# Patient Record
Sex: FEMALE | Race: WHITE | NOT HISPANIC OR LATINO | Employment: FULL TIME | URBAN - METROPOLITAN AREA
[De-identification: names, ages, dates, MRNs, and addresses within clinical notes are randomized per-mention and may not be internally consistent; named-entity substitution may affect disease eponyms.]

---

## 2023-09-19 LAB
BASOPHILS (10*3/UL) IN BLOOD BY AUTOMATED COUNT: 0.04 X10E9/L (ref 0–0.1)
BASOPHILS/100 LEUKOCYTES IN BLOOD BY AUTOMATED COUNT: 0.6 % (ref 0–2)
C REACTIVE PROTEIN (MG/L) IN SER/PLAS BY HIGH SENSIT: 1.5 MG/L
EOSINOPHILS (10*3/UL) IN BLOOD BY AUTOMATED COUNT: 0.03 X10E9/L (ref 0–0.7)
EOSINOPHILS/100 LEUKOCYTES IN BLOOD BY AUTOMATED COUNT: 0.5 % (ref 0–6)
ERYTHROCYTE DISTRIBUTION WIDTH (RATIO) BY AUTOMATED COUNT: 15.3 % (ref 11.5–14.5)
ERYTHROCYTE MEAN CORPUSCULAR HEMOGLOBIN CONCENTRATION (G/DL) BY AUTOMATED: 32.2 G/DL (ref 32–36)
ERYTHROCYTE MEAN CORPUSCULAR VOLUME (FL) BY AUTOMATED COUNT: 86 FL (ref 80–100)
ERYTHROCYTES (10*6/UL) IN BLOOD BY AUTOMATED COUNT: 5.57 X10E12/L (ref 4–5.2)
HEMATOCRIT (%) IN BLOOD BY AUTOMATED COUNT: 47.9 % (ref 36–46)
HEMOGLOBIN (G/DL) IN BLOOD: 15.4 G/DL (ref 12–16)
IGA (MG/DL) IN SER/PLAS: 116 MG/DL (ref 70–400)
IGG (MG/DL) IN SER/PLAS: 764 MG/DL (ref 700–1600)
IGG (MG/DL) IN SER/PLAS: 764 MG/DL (ref 700–1600)
IGG SUBCLASS 1 (MG/DL) IN SERUM: 496 MG/DL (ref 490–1140)
IGG SUBCLASS 2 (MG/DL) IN SERUM: 256 MG/DL (ref 150–640)
IGG SUBCLASS 3 (MG/DL) IN SERUM: 24 MG/DL (ref 11–85)
IGG SUBCLASS 4 (MG/DL) IN SERUM: 27 MG/DL (ref 3–200)
IGM (MG/DL) IN SER/PLAS: 58 MG/DL (ref 40–230)
IMMATURE GRANULOCYTES/100 LEUKOCYTES IN BLOOD BY AUTOMATED COUNT: 0.3 % (ref 0–0.9)
LEUKOCYTES (10*3/UL) IN BLOOD BY AUTOMATED COUNT: 6.4 X10E9/L (ref 4.4–11.3)
LYMPHOCYTES (10*3/UL) IN BLOOD BY AUTOMATED COUNT: 2.02 X10E9/L (ref 1.2–4.8)
LYMPHOCYTES/100 LEUKOCYTES IN BLOOD BY AUTOMATED COUNT: 31.5 % (ref 13–44)
MONOCYTES (10*3/UL) IN BLOOD BY AUTOMATED COUNT: 0.34 X10E9/L (ref 0.1–1)
MONOCYTES/100 LEUKOCYTES IN BLOOD BY AUTOMATED COUNT: 5.3 % (ref 2–10)
NEUTROPHILS (10*3/UL) IN BLOOD BY AUTOMATED COUNT: 3.97 X10E9/L (ref 1.2–7.7)
NEUTROPHILS/100 LEUKOCYTES IN BLOOD BY AUTOMATED COUNT: 61.8 % (ref 40–80)
PLATELETS (10*3/UL) IN BLOOD AUTOMATED COUNT: 276 X10E9/L (ref 150–450)
SEDIMENTATION RATE, ERYTHROCYTE: 1 MM/H (ref 0–30)

## 2023-09-20 LAB
ALLERGEN ANIMAL: CAT DANDER IGE (KU/L): <0.1 KU/L
ALLERGEN ANIMAL: DOG DANDER IGE (KU/L): <0.1 KU/L
ALLERGEN GRASS: BERMUDA GRASS (CYNODON DACTYLON) IGE (KU/L): <0.1 KU/L
ALLERGEN GRASS: JOHNSON GRASS (SORGHUM HALEPENSE) IGE (KU/L): <0.1 KU/L
ALLERGEN GRASS: MEADOW GRASS, KENTUCKY BLUE (POA PRATENSIS )IGE (KU/L): <0.1 KU/L
ALLERGEN GRASS: TIMOTHY GRASS (PHLEUM PRATENSE) IGE (KU/L): <0.1 KU/L
ALLERGEN INSECT: COCKROACH IGE: <0.1 KU/L
ALLERGEN MICROORGANISM: ALTERNARIA ALTERNATA IGE (KU/L): <0.1 KU/L
ALLERGEN MICROORGANISM: ASPERGILLUS FUMIGATUS IGE (KU/L): <0.1 KU/L
ALLERGEN MICROORGANISM: CLADOSPORIUM HERBARUM IGE (KU/L): <0.1 KU/L
ALLERGEN MICROORGANISM: PENICILLIUM CHRYSOGENUM (P. NOTATUM) IGE (KU/L): <0.1 KU/L
ALLERGEN MITE: DERMATOPHAGOIDES FARINAE (HOUSE DUST MITE) IGE (KU/L): <0.1 KU/L
ALLERGEN MITE: DERMATOPHAGOIDES PTERONYSSINUS (HOUSE DUST MITE) IGE (KU/L): <0.1 KU/L
ALLERGEN TREE: BOX-ELDER (ACER NEGUNDO) IGE (KU/L): <0.1 KU/L
ALLERGEN TREE: COMMON SILVER BIRCH (BETULA VERRUCOSA) IGE (KU/L): <0.1 KU/L
ALLERGEN TREE: COTTONWOOD (POPULUS DELTOIDES) IGE (KU/L): <0.1 KU/L
ALLERGEN TREE: ELM (ULMUS AMERICANA) IGE (KU/L): <0.1 KU/L
ALLERGEN TREE: MAPLE LEAF SYCAMORE, LONDON PLANE IGE (KU/L): <0.1 KU/L
ALLERGEN TREE: MOUNTAIN JUNIPER (JUNIPERUS SABINOIDES) IGE (KU/L): <0.1 KU/L
ALLERGEN TREE: MULBERRY (MORUS ALBA) IGE (KU/L): <0.1 KU/L
ALLERGEN TREE: OAK (QUERCUS ALBA) IGE (KU/L): <0.1 KU/L
ALLERGEN TREE: PECAN, HICKORY (CARYA PECAN) IGE (KU/L): <0.1 KU/L
ALLERGEN TREE: WALNUT IGE: <0.1 KU/L
ALLERGEN TREE: WHITE ASH (FRAXINUS AMERICANA) IGE (KU/L): <0.1 KU/L
ALLERGEN WEED: COMMON PIGWEED (AMARANTHUS RETROFLEXUS) IGE (KU/L): <0.1 KU/L
ALLERGEN WEED: COMMON RAGWEED (AMB. ARTEMISIIFOLIA/A. ELATIOR) IGE (KU/L): <0.1 KU/L
ALLERGEN WEED: GOOSEFOOT, LAMB'S QUARTERS (CHENOPODIUM ALBUM) IGE (KU/L): <0.1 KU/L
ALLERGEN WEED: PLANTAIN (ENGLISH), RIBWORT (PLANTAGO LANCEOLATA) IGE (KU/L): <0.1 KU/L
ALLERGEN WEED: PRICKLY SALTWORT/RUSSIAN THISTLE (SALSOLA KALI) IGE (KU/L): <0.1 KU/L
ALLERGEN WEED: SHEEP SORREL (RUMEX ACETOSELLA) IGE (KU/L): <0.1 KU/L
IMMUNOCAP IGE: 53.3 KU/L (ref 0–214)
IMMUNOCAP INTERPRETATION: NORMAL

## 2023-09-22 LAB
CD19 ABSOLUTE: 0.08 X10E9/L (ref 0.07–0.91)
CD19%: 4 % (ref 6–19)
CD3 ABSOLUTE: 1.55 X10E9/L (ref 0.71–4.18)
CD3%: 77 % (ref 59–87)
CD3+CD4+ ABSOLUTE: 1.23 X10E9/L (ref 0.35–2.74)
CD3+CD4-CD8-%: 1 % (ref 0–6)
CD3+CD8+ ABSOLUTE: 0.3 X10E9/L (ref 0.08–1.49)
CD3-CD16+CD56+ ABSOLUTE: 0.38 X10E9/L (ref 0–0.86)
CD3-CD16+CD56+%: 19 % (ref 0–18)
CD4/CD8 RATIO: 4.07 (ref 1–3.5)
CD45%: 100 %
CP CD3+CD4+%: 61 % (ref 29–57)
CP CD3+CD8+%: 15 % (ref 7–31)
FMETH: ABNORMAL
FSIT1: ABNORMAL
INTERFERON GAMMA: <4.2 PG/ML
INTERLEUKIN 1 BETA: <6.5 PG/ML
INTERLEUKIN 10: 3.9 PG/ML
INTERLEUKIN 12: <1.9 PG/ML
INTERLEUKIN 13: <1.7 PG/ML
INTERLEUKIN 17: <1.4 PG/ML
INTERLEUKIN 2 RECEPTOR, SOLUBLE: 757.5 PG/ML (ref 175.3–858.2)
INTERLEUKIN 2: <2.1 PG/ML
INTERLEUKIN 4: <2.2 PG/ML
INTERLEUKIN 5: <2.1 PG/ML
INTERLEUKIN 6: <2 PG/ML
INTERLEUKIN 8: <3 PG/ML
MARKER INTERPRETATION: ABNORMAL
PNEUMO SEROTYPE INTERPRETATION: NORMAL
PV191: NORMAL
SEROTYPE 1, VIRC: 0.08 UG/ML
SEROTYPE 10A(34), VIRC: 0.83 UG/ML
SEROTYPE 11A(43), VIRC: 0.39 UG/ML
SEROTYPE 12F, VIRC: 0.25 UG/ML
SEROTYPE 14, VIRC: 0.78 UG/ML
SEROTYPE 15B(54), VIRC: 1.18 UG/ML
SEROTYPE 17F, VIRC: 0.63 UG/ML
SEROTYPE 18C(56), VIRC: 0.38 UG/ML
SEROTYPE 19A(57), VIRC: 1.38 UG/ML
SEROTYPE 19F, VIRC: 1.21 UG/ML
SEROTYPE 2, VIRC: 3.34 UG/ML
SEROTYPE 20, VIRC: 0.43 UG/ML
SEROTYPE 22F, VIRC: 0.22 UG/ML
SEROTYPE 23F, VIRC: 0.02 UG/ML
SEROTYPE 3, VIRC: 0.88 UG/ML
SEROTYPE 33F(70), VIRC: 2.1 UG/ML
SEROTYPE 4, VIRC: 0.35 UG/ML
SEROTYPE 5, VIRC: 0.14 UG/ML
SEROTYPE 6B(26), VIRC: 0.55 UG/ML
SEROTYPE 7F(51), VIRC: 0.17 UG/ML
SEROTYPE 8, VIRC: 0.64 UG/ML
SEROTYPE 9N, VIRC: 0.55 UG/ML
SEROTYPE 9V(68), VIRC: 0.42 UG/ML
TETANUS AB IGG: 2.5 IU/ML
TUMOR NECROSIS FACTOR ALPHA: <1.7 PG/ML

## 2023-09-28 LAB — TRYPTASE: 8.1 MCG/L

## 2023-10-06 ENCOUNTER — TELEPHONE (OUTPATIENT)
Dept: ALLERGY | Facility: CLINIC | Age: 63
End: 2023-10-06
Payer: OTHER GOVERNMENT

## 2023-12-03 ENCOUNTER — ANESTHESIA (OUTPATIENT)
Dept: OPERATING ROOM | Facility: HOSPITAL | Age: 63
DRG: 142 | End: 2023-12-03
Payer: OTHER GOVERNMENT

## 2023-12-03 ENCOUNTER — HOSPITAL ENCOUNTER (INPATIENT)
Facility: HOSPITAL | Age: 63
LOS: 4 days | DRG: 142 | End: 2023-12-07
Attending: EMERGENCY MEDICINE
Payer: OTHER GOVERNMENT

## 2023-12-03 ENCOUNTER — APPOINTMENT (OUTPATIENT)
Dept: RADIOLOGY | Facility: HOSPITAL | Age: 63
End: 2023-12-03
Payer: OTHER GOVERNMENT

## 2023-12-03 ENCOUNTER — HOSPITAL ENCOUNTER (EMERGENCY)
Facility: HOSPITAL | Age: 63
Discharge: ANOTHER HOSPITAL | End: 2023-12-03
Attending: EMERGENCY MEDICINE
Payer: OTHER GOVERNMENT

## 2023-12-03 VITALS
WEIGHT: 150 LBS | SYSTOLIC BLOOD PRESSURE: 123 MMHG | HEART RATE: 85 BPM | OXYGEN SATURATION: 96 % | RESPIRATION RATE: 16 BRPM | BODY MASS INDEX: 24.11 KG/M2 | HEIGHT: 66 IN | TEMPERATURE: 97.9 F | DIASTOLIC BLOOD PRESSURE: 67 MMHG

## 2023-12-03 DIAGNOSIS — M27.2: ICD-10-CM

## 2023-12-03 DIAGNOSIS — M27.2 ACUTE OSTEOMYELITIS OF MANDIBLE: ICD-10-CM

## 2023-12-03 DIAGNOSIS — R20.0 ANESTHESIA: Primary | ICD-10-CM

## 2023-12-03 DIAGNOSIS — M27.2 OSTEOMYELITIS OF MANDIBLE: Primary | ICD-10-CM

## 2023-12-03 DIAGNOSIS — M86.28 SUBACUTE OSTEOMYELITIS, OTHER SITE (MULTI): Primary | ICD-10-CM

## 2023-12-03 DIAGNOSIS — R68.84 JAW PAIN, NON-TMJ: Primary | ICD-10-CM

## 2023-12-03 DIAGNOSIS — M86.9 OSTEOMYELITIS (MULTI): Primary | ICD-10-CM

## 2023-12-03 PROBLEM — J44.9 CHRONIC OBSTRUCTIVE PULMONARY DISEASE (MULTI): Status: ACTIVE | Noted: 2023-12-03

## 2023-12-03 LAB
ABO GROUP (TYPE) IN BLOOD: NORMAL
ANION GAP SERPL CALC-SCNC: 9 MMOL/L (ref 10–20)
ANTIBODY SCREEN: NORMAL
APTT PPP: 31 SECONDS (ref 27–38)
BASOPHILS # BLD AUTO: 0.04 X10*3/UL (ref 0–0.1)
BASOPHILS NFR BLD AUTO: 0.4 %
BUN SERPL-MCNC: 12 MG/DL (ref 6–23)
CALCIUM SERPL-MCNC: 8.9 MG/DL (ref 8.6–10.3)
CHLORIDE SERPL-SCNC: 104 MMOL/L (ref 98–107)
CO2 SERPL-SCNC: 30 MMOL/L (ref 21–32)
CREAT SERPL-MCNC: 0.77 MG/DL (ref 0.5–1.05)
EOSINOPHIL # BLD AUTO: 0.17 X10*3/UL (ref 0–0.7)
EOSINOPHIL NFR BLD AUTO: 1.5 %
ERYTHROCYTE [DISTWIDTH] IN BLOOD BY AUTOMATED COUNT: 14 % (ref 11.5–14.5)
GFR SERPL CREATININE-BSD FRML MDRD: 87 ML/MIN/1.73M*2
GLUCOSE SERPL-MCNC: 98 MG/DL (ref 74–99)
HCT VFR BLD AUTO: 47.6 % (ref 36–46)
HGB BLD-MCNC: 15.6 G/DL (ref 12–16)
IMM GRANULOCYTES # BLD AUTO: 0.04 X10*3/UL (ref 0–0.7)
IMM GRANULOCYTES NFR BLD AUTO: 0.4 % (ref 0–0.9)
INR PPP: 1 (ref 0.9–1.1)
LYMPHOCYTES # BLD AUTO: 2.56 X10*3/UL (ref 1.2–4.8)
LYMPHOCYTES NFR BLD AUTO: 23.1 %
MCH RBC QN AUTO: 28.4 PG (ref 26–34)
MCHC RBC AUTO-ENTMCNC: 32.8 G/DL (ref 32–36)
MCV RBC AUTO: 87 FL (ref 80–100)
MONOCYTES # BLD AUTO: 0.67 X10*3/UL (ref 0.1–1)
MONOCYTES NFR BLD AUTO: 6 %
NEUTROPHILS # BLD AUTO: 7.6 X10*3/UL (ref 1.2–7.7)
NEUTROPHILS NFR BLD AUTO: 68.6 %
NRBC BLD-RTO: 0 /100 WBCS (ref 0–0)
PLATELET # BLD AUTO: 280 X10*3/UL (ref 150–450)
POTASSIUM SERPL-SCNC: 4 MMOL/L (ref 3.5–5.3)
PROTHROMBIN TIME: 11.1 SECONDS (ref 9.8–12.8)
RBC # BLD AUTO: 5.5 X10*6/UL (ref 4–5.2)
RH FACTOR (ANTIGEN D): NORMAL
SODIUM SERPL-SCNC: 139 MMOL/L (ref 136–145)
WBC # BLD AUTO: 11.1 X10*3/UL (ref 4.4–11.3)

## 2023-12-03 PROCEDURE — 3700000002 HC GENERAL ANESTHESIA TIME - EACH INCREMENTAL 1 MINUTE: Performed by: DENTIST

## 2023-12-03 PROCEDURE — 0NRV0JZ REPLACEMENT OF LEFT MANDIBLE WITH SYNTHETIC SUBSTITUTE, OPEN APPROACH: ICD-10-PCS | Performed by: DENTIST

## 2023-12-03 PROCEDURE — 96372 THER/PROPH/DIAG INJ SC/IM: CPT | Performed by: DENTIST

## 2023-12-03 PROCEDURE — 2500000001 HC RX 250 WO HCPCS SELF ADMINISTERED DRUGS (ALT 637 FOR MEDICARE OP)

## 2023-12-03 PROCEDURE — 0CDWXZ0 EXTRACTION OF UPPER TOOTH, SINGLE, EXTERNAL APPROACH: ICD-10-PCS | Performed by: DENTIST

## 2023-12-03 PROCEDURE — 2500000004 HC RX 250 GENERAL PHARMACY W/ HCPCS (ALT 636 FOR OP/ED): Performed by: STUDENT IN AN ORGANIZED HEALTH CARE EDUCATION/TRAINING PROGRAM

## 2023-12-03 PROCEDURE — 96367 TX/PROPH/DG ADDL SEQ IV INF: CPT

## 2023-12-03 PROCEDURE — 0NRT0JZ REPLACEMENT OF RIGHT MANDIBLE WITH SYNTHETIC SUBSTITUTE, OPEN APPROACH: ICD-10-PCS | Performed by: DENTIST

## 2023-12-03 PROCEDURE — 2550000001 HC RX 255 CONTRASTS: Performed by: PHYSICIAN ASSISTANT

## 2023-12-03 PROCEDURE — 7100000002 HC RECOVERY ROOM TIME - EACH INCREMENTAL 1 MINUTE: Performed by: DENTIST

## 2023-12-03 PROCEDURE — 2720000007 HC OR 272 NO HCPCS: Performed by: DENTIST

## 2023-12-03 PROCEDURE — 86901 BLOOD TYPING SEROLOGIC RH(D): CPT | Performed by: STUDENT IN AN ORGANIZED HEALTH CARE EDUCATION/TRAINING PROGRAM

## 2023-12-03 PROCEDURE — 0NBT0ZZ EXCISION OF RIGHT MANDIBLE, OPEN APPROACH: ICD-10-PCS | Performed by: DENTIST

## 2023-12-03 PROCEDURE — 2550000001 HC RX 255 CONTRASTS: Performed by: EMERGENCY MEDICINE

## 2023-12-03 PROCEDURE — 3600000002 HC OR TIME - INITIAL BASE CHARGE - PROCEDURE LEVEL TWO: Performed by: DENTIST

## 2023-12-03 PROCEDURE — 96365 THER/PROPH/DIAG IV INF INIT: CPT | Mod: 59

## 2023-12-03 PROCEDURE — 88311 DECALCIFY TISSUE: CPT | Performed by: DENTIST

## 2023-12-03 PROCEDURE — 99285 EMERGENCY DEPT VISIT HI MDM: CPT | Mod: 25 | Performed by: EMERGENCY MEDICINE

## 2023-12-03 PROCEDURE — 7100000001 HC RECOVERY ROOM TIME - INITIAL BASE CHARGE: Performed by: DENTIST

## 2023-12-03 PROCEDURE — 85025 COMPLETE CBC W/AUTO DIFF WBC: CPT | Performed by: PHYSICIAN ASSISTANT

## 2023-12-03 PROCEDURE — 88312 SPECIAL STAINS GROUP 1: CPT | Performed by: DENTIST

## 2023-12-03 PROCEDURE — 88305 TISSUE EXAM BY PATHOLOGIST: CPT | Performed by: DENTIST

## 2023-12-03 PROCEDURE — 85730 THROMBOPLASTIN TIME PARTIAL: CPT | Performed by: STUDENT IN AN ORGANIZED HEALTH CARE EDUCATION/TRAINING PROGRAM

## 2023-12-03 PROCEDURE — 99285 EMERGENCY DEPT VISIT HI MDM: CPT | Performed by: EMERGENCY MEDICINE

## 2023-12-03 PROCEDURE — 0NBV0ZZ EXCISION OF LEFT MANDIBLE, OPEN APPROACH: ICD-10-PCS | Performed by: DENTIST

## 2023-12-03 PROCEDURE — 87102 FUNGUS ISOLATION CULTURE: CPT | Performed by: DENTIST

## 2023-12-03 PROCEDURE — C1713 ANCHOR/SCREW BN/BN,TIS/BN: HCPCS | Performed by: DENTIST

## 2023-12-03 PROCEDURE — 2500000005 HC RX 250 GENERAL PHARMACY W/O HCPCS: Performed by: STUDENT IN AN ORGANIZED HEALTH CARE EDUCATION/TRAINING PROGRAM

## 2023-12-03 PROCEDURE — 2500000004 HC RX 250 GENERAL PHARMACY W/ HCPCS (ALT 636 FOR OP/ED): Performed by: PHYSICIAN ASSISTANT

## 2023-12-03 PROCEDURE — 70491 CT SOFT TISSUE NECK W/DYE: CPT

## 2023-12-03 PROCEDURE — 3700000001 HC GENERAL ANESTHESIA TIME - INITIAL BASE CHARGE: Performed by: DENTIST

## 2023-12-03 PROCEDURE — A4217 STERILE WATER/SALINE, 500 ML: HCPCS | Performed by: DENTIST

## 2023-12-03 PROCEDURE — 85610 PROTHROMBIN TIME: CPT | Performed by: STUDENT IN AN ORGANIZED HEALTH CARE EDUCATION/TRAINING PROGRAM

## 2023-12-03 PROCEDURE — 2500000005 HC RX 250 GENERAL PHARMACY W/O HCPCS: Performed by: DENTIST

## 2023-12-03 PROCEDURE — 2500000004 HC RX 250 GENERAL PHARMACY W/ HCPCS (ALT 636 FOR OP/ED)

## 2023-12-03 PROCEDURE — 88305 TISSUE EXAM BY PATHOLOGIST: CPT | Mod: TC,SUR | Performed by: DENTIST

## 2023-12-03 PROCEDURE — 1100000001 HC PRIVATE ROOM DAILY

## 2023-12-03 PROCEDURE — 94640 AIRWAY INHALATION TREATMENT: CPT | Performed by: PAIN MEDICINE

## 2023-12-03 PROCEDURE — 87075 CULTR BACTERIA EXCEPT BLOOD: CPT | Performed by: DENTIST

## 2023-12-03 PROCEDURE — 3600000007 HC OR TIME - EACH INCREMENTAL 1 MINUTE - PROCEDURE LEVEL TWO: Performed by: DENTIST

## 2023-12-03 PROCEDURE — 88311 DECALCIFY TISSUE: CPT | Mod: TC,SUR | Performed by: DENTIST

## 2023-12-03 PROCEDURE — 2500000005 HC RX 250 GENERAL PHARMACY W/O HCPCS

## 2023-12-03 PROCEDURE — 36415 COLL VENOUS BLD VENIPUNCTURE: CPT | Performed by: PHYSICIAN ASSISTANT

## 2023-12-03 PROCEDURE — 86850 RBC ANTIBODY SCREEN: CPT | Performed by: STUDENT IN AN ORGANIZED HEALTH CARE EDUCATION/TRAINING PROGRAM

## 2023-12-03 PROCEDURE — 70491 CT SOFT TISSUE NECK W/DYE: CPT | Performed by: STUDENT IN AN ORGANIZED HEALTH CARE EDUCATION/TRAINING PROGRAM

## 2023-12-03 PROCEDURE — 36415 COLL VENOUS BLD VENIPUNCTURE: CPT | Performed by: STUDENT IN AN ORGANIZED HEALTH CARE EDUCATION/TRAINING PROGRAM

## 2023-12-03 PROCEDURE — 2500000002 HC RX 250 W HCPCS SELF ADMINISTERED DRUGS (ALT 637 FOR MEDICARE OP, ALT 636 FOR OP/ED): Performed by: STUDENT IN AN ORGANIZED HEALTH CARE EDUCATION/TRAINING PROGRAM

## 2023-12-03 PROCEDURE — 80048 BASIC METABOLIC PNL TOTAL CA: CPT | Performed by: PHYSICIAN ASSISTANT

## 2023-12-03 PROCEDURE — A11044 PR DEBRIDEMENT, SKIN, SUB-Q TISSUE,MUSCLE,BONE,=<20 SQ CM: Performed by: PAIN MEDICINE

## 2023-12-03 PROCEDURE — 2500000004 HC RX 250 GENERAL PHARMACY W/ HCPCS (ALT 636 FOR OP/ED): Performed by: DENTIST

## 2023-12-03 PROCEDURE — 2780000003 HC OR 278 NO HCPCS: Performed by: DENTIST

## 2023-12-03 PROCEDURE — 96374 THER/PROPH/DIAG INJ IV PUSH: CPT | Mod: 59

## 2023-12-03 DEVICE — IMPLANTABLE DEVICE: Type: IMPLANTABLE DEVICE | Site: MANDIBLE | Status: FUNCTIONAL

## 2023-12-03 DEVICE — DRILL, STRYKER 1.6 X 58 TWIST: Type: IMPLANTABLE DEVICE | Site: MANDIBLE | Status: NON-FUNCTIONAL

## 2023-12-03 DEVICE — SCREW, 2 X 12 CROSS PIN: Type: IMPLANTABLE DEVICE | Site: MANDIBLE | Status: FUNCTIONAL

## 2023-12-03 DEVICE — SCREW, 2 X 10 CROSS PIN: Type: IMPLANTABLE DEVICE | Site: MANDIBLE | Status: FUNCTIONAL

## 2023-12-03 RX ORDER — LIDOCAINE HCL/PF 100 MG/5ML
SYRINGE (ML) INTRAVENOUS AS NEEDED
Status: DISCONTINUED | OUTPATIENT
Start: 2023-12-03 | End: 2023-12-03

## 2023-12-03 RX ORDER — ENOXAPARIN SODIUM 100 MG/ML
30 INJECTION SUBCUTANEOUS EVERY 12 HOURS
Status: DISCONTINUED | OUTPATIENT
Start: 2023-12-03 | End: 2023-12-07 | Stop reason: HOSPADM

## 2023-12-03 RX ORDER — ALBUTEROL SULFATE 0.83 MG/ML
2.5 SOLUTION RESPIRATORY (INHALATION) ONCE AS NEEDED
Status: COMPLETED | OUTPATIENT
Start: 2023-12-03 | End: 2023-12-03

## 2023-12-03 RX ORDER — LIDOCAINE HYDROCHLORIDE AND EPINEPHRINE 10; 10 MG/ML; UG/ML
INJECTION, SOLUTION INFILTRATION; PERINEURAL AS NEEDED
Status: DISCONTINUED | OUTPATIENT
Start: 2023-12-03 | End: 2023-12-03 | Stop reason: HOSPADM

## 2023-12-03 RX ORDER — ACETAMINOPHEN 325 MG/1
650 TABLET ORAL ONCE
Status: COMPLETED | OUTPATIENT
Start: 2023-12-03 | End: 2023-12-03

## 2023-12-03 RX ORDER — SODIUM CHLORIDE, SODIUM LACTATE, POTASSIUM CHLORIDE, CALCIUM CHLORIDE 600; 310; 30; 20 MG/100ML; MG/100ML; MG/100ML; MG/100ML
75 INJECTION, SOLUTION INTRAVENOUS CONTINUOUS
Status: DISCONTINUED | OUTPATIENT
Start: 2023-12-03 | End: 2023-12-06

## 2023-12-03 RX ORDER — ONDANSETRON HYDROCHLORIDE 2 MG/ML
INJECTION, SOLUTION INTRAVENOUS AS NEEDED
Status: DISCONTINUED | OUTPATIENT
Start: 2023-12-03 | End: 2023-12-03

## 2023-12-03 RX ORDER — HYDROMORPHONE HYDROCHLORIDE 1 MG/ML
INJECTION, SOLUTION INTRAMUSCULAR; INTRAVENOUS; SUBCUTANEOUS AS NEEDED
Status: DISCONTINUED | OUTPATIENT
Start: 2023-12-03 | End: 2023-12-03

## 2023-12-03 RX ORDER — GUAIFENESIN/DEXTROMETHORPHAN 100-10MG/5
5 SYRUP ORAL 3 TIMES DAILY PRN
COMMUNITY
End: 2023-12-07 | Stop reason: HOSPADM

## 2023-12-03 RX ORDER — VANCOMYCIN HYDROCHLORIDE 1 G/200ML
1000 INJECTION, SOLUTION INTRAVENOUS ONCE
Status: COMPLETED | OUTPATIENT
Start: 2023-12-03 | End: 2023-12-03

## 2023-12-03 RX ORDER — ACETAMINOPHEN 325 MG/1
975 TABLET ORAL ONCE
Status: DISCONTINUED | OUTPATIENT
Start: 2023-12-03 | End: 2023-12-03 | Stop reason: HOSPADM

## 2023-12-03 RX ORDER — ESMOLOL HYDROCHLORIDE 10 MG/ML
INJECTION INTRAVENOUS AS NEEDED
Status: DISCONTINUED | OUTPATIENT
Start: 2023-12-03 | End: 2023-12-03

## 2023-12-03 RX ORDER — LABETALOL HYDROCHLORIDE 5 MG/ML
5 INJECTION, SOLUTION INTRAVENOUS ONCE AS NEEDED
Status: DISCONTINUED | OUTPATIENT
Start: 2023-12-03 | End: 2023-12-03 | Stop reason: HOSPADM

## 2023-12-03 RX ORDER — KETOROLAC TROMETHAMINE 30 MG/ML
15 INJECTION, SOLUTION INTRAMUSCULAR; INTRAVENOUS ONCE
Status: DISCONTINUED | OUTPATIENT
Start: 2023-12-03 | End: 2023-12-03 | Stop reason: HOSPADM

## 2023-12-03 RX ORDER — CLINDAMYCIN PHOSPHATE 600 MG/50ML
600 INJECTION, SOLUTION INTRAVENOUS ONCE
Status: DISCONTINUED | OUTPATIENT
Start: 2023-12-03 | End: 2023-12-03

## 2023-12-03 RX ORDER — CODEINE PHOSPHATE AND GUAIFENESIN 10; 100 MG/5ML; MG/5ML
10 SOLUTION ORAL ONCE
Status: DISCONTINUED | OUTPATIENT
Start: 2023-12-03 | End: 2023-12-03 | Stop reason: HOSPADM

## 2023-12-03 RX ORDER — CHLORHEXIDINE GLUCONATE ORAL RINSE 1.2 MG/ML
15 SOLUTION DENTAL 2 TIMES DAILY
Status: DISCONTINUED | OUTPATIENT
Start: 2023-12-03 | End: 2023-12-07 | Stop reason: HOSPADM

## 2023-12-03 RX ORDER — CHOLECALCIFEROL (VITAMIN D3) 50 MCG
50 TABLET ORAL DAILY
COMMUNITY
End: 2023-12-07 | Stop reason: HOSPADM

## 2023-12-03 RX ORDER — AMPICILLIN AND SULBACTAM 2; 1 G/1; G/1
INJECTION, POWDER, FOR SOLUTION INTRAMUSCULAR; INTRAVENOUS AS NEEDED
Status: DISCONTINUED | OUTPATIENT
Start: 2023-12-03 | End: 2023-12-03

## 2023-12-03 RX ORDER — ACETAMINOPHEN 325 MG/1
650 TABLET ORAL EVERY 6 HOURS
Status: DISCONTINUED | OUTPATIENT
Start: 2023-12-03 | End: 2023-12-07 | Stop reason: HOSPADM

## 2023-12-03 RX ORDER — OXYCODONE HYDROCHLORIDE 5 MG/1
10 TABLET ORAL EVERY 4 HOURS PRN
Status: DISCONTINUED | OUTPATIENT
Start: 2023-12-03 | End: 2023-12-03

## 2023-12-03 RX ORDER — ONDANSETRON HYDROCHLORIDE 2 MG/ML
4 INJECTION, SOLUTION INTRAVENOUS EVERY 8 HOURS PRN
Status: DISCONTINUED | OUTPATIENT
Start: 2023-12-03 | End: 2023-12-07 | Stop reason: HOSPADM

## 2023-12-03 RX ORDER — ACETAMINOPHEN 160 MG/5ML
650 SOLUTION ORAL EVERY 6 HOURS
Status: DISCONTINUED | OUTPATIENT
Start: 2023-12-03 | End: 2023-12-07 | Stop reason: HOSPADM

## 2023-12-03 RX ORDER — HYDROMORPHONE HYDROCHLORIDE 1 MG/ML
0.2 INJECTION, SOLUTION INTRAMUSCULAR; INTRAVENOUS; SUBCUTANEOUS ONCE
Status: DISCONTINUED | OUTPATIENT
Start: 2023-12-03 | End: 2023-12-03

## 2023-12-03 RX ORDER — TRAMADOL HYDROCHLORIDE 50 MG/1
50 TABLET ORAL ONCE
Status: DISCONTINUED | OUTPATIENT
Start: 2023-12-03 | End: 2023-12-04

## 2023-12-03 RX ORDER — KETOROLAC TROMETHAMINE 30 MG/ML
30 INJECTION, SOLUTION INTRAMUSCULAR; INTRAVENOUS EVERY 6 HOURS PRN
Status: DISCONTINUED | OUTPATIENT
Start: 2023-12-03 | End: 2023-12-07 | Stop reason: HOSPADM

## 2023-12-03 RX ORDER — ONDANSETRON 4 MG/1
4 TABLET, ORALLY DISINTEGRATING ORAL EVERY 8 HOURS PRN
Status: DISCONTINUED | OUTPATIENT
Start: 2023-12-03 | End: 2023-12-07 | Stop reason: HOSPADM

## 2023-12-03 RX ORDER — LIDOCAINE HYDROCHLORIDE AND EPINEPHRINE 10; 10 MG/ML; UG/ML
5 INJECTION, SOLUTION INFILTRATION; PERINEURAL ONCE
Status: DISCONTINUED | OUTPATIENT
Start: 2023-12-03 | End: 2023-12-07 | Stop reason: HOSPADM

## 2023-12-03 RX ORDER — ROCURONIUM BROMIDE 10 MG/ML
INJECTION, SOLUTION INTRAVENOUS AS NEEDED
Status: DISCONTINUED | OUTPATIENT
Start: 2023-12-03 | End: 2023-12-03

## 2023-12-03 RX ORDER — CLINDAMYCIN PHOSPHATE 600 MG/50ML
600 INJECTION, SOLUTION INTRAVENOUS EVERY 8 HOURS SCHEDULED
Status: DISCONTINUED | OUTPATIENT
Start: 2023-12-03 | End: 2023-12-03

## 2023-12-03 RX ORDER — DEXAMETHASONE SODIUM PHOSPHATE 4 MG/ML
INJECTION, SOLUTION INTRA-ARTICULAR; INTRALESIONAL; INTRAMUSCULAR; INTRAVENOUS; SOFT TISSUE AS NEEDED
Status: DISCONTINUED | OUTPATIENT
Start: 2023-12-03 | End: 2023-12-03

## 2023-12-03 RX ORDER — HYDROMORPHONE HYDROCHLORIDE 1 MG/ML
0.2 INJECTION, SOLUTION INTRAMUSCULAR; INTRAVENOUS; SUBCUTANEOUS EVERY 5 MIN PRN
Status: DISCONTINUED | OUTPATIENT
Start: 2023-12-03 | End: 2023-12-03 | Stop reason: HOSPADM

## 2023-12-03 RX ORDER — ACETAMINOPHEN 325 MG/1
1-2 TABLET ORAL EVERY 6 HOURS PRN
COMMUNITY
End: 2023-12-25

## 2023-12-03 RX ORDER — SODIUM CHLORIDE 0.9 G/100ML
IRRIGANT IRRIGATION AS NEEDED
Status: DISCONTINUED | OUTPATIENT
Start: 2023-12-03 | End: 2023-12-03 | Stop reason: HOSPADM

## 2023-12-03 RX ORDER — OXYCODONE HYDROCHLORIDE 5 MG/1
5 TABLET ORAL EVERY 4 HOURS PRN
Status: DISCONTINUED | OUTPATIENT
Start: 2023-12-03 | End: 2023-12-03

## 2023-12-03 RX ORDER — DROPERIDOL 2.5 MG/ML
0.62 INJECTION, SOLUTION INTRAMUSCULAR; INTRAVENOUS ONCE AS NEEDED
Status: DISCONTINUED | OUTPATIENT
Start: 2023-12-03 | End: 2023-12-03 | Stop reason: HOSPADM

## 2023-12-03 RX ORDER — BENZONATATE 100 MG/1
100 CAPSULE ORAL 3 TIMES DAILY PRN
Status: DISCONTINUED | OUTPATIENT
Start: 2023-12-03 | End: 2023-12-04

## 2023-12-03 RX ORDER — FENTANYL CITRATE 50 UG/ML
INJECTION, SOLUTION INTRAMUSCULAR; INTRAVENOUS AS NEEDED
Status: DISCONTINUED | OUTPATIENT
Start: 2023-12-03 | End: 2023-12-03

## 2023-12-03 RX ORDER — SODIUM CHLORIDE, SODIUM LACTATE, POTASSIUM CHLORIDE, CALCIUM CHLORIDE 600; 310; 30; 20 MG/100ML; MG/100ML; MG/100ML; MG/100ML
100 INJECTION, SOLUTION INTRAVENOUS CONTINUOUS
Status: DISCONTINUED | OUTPATIENT
Start: 2023-12-03 | End: 2023-12-03 | Stop reason: HOSPADM

## 2023-12-03 RX ORDER — HYDROMORPHONE HYDROCHLORIDE 1 MG/ML
0.5 INJECTION, SOLUTION INTRAMUSCULAR; INTRAVENOUS; SUBCUTANEOUS EVERY 5 MIN PRN
Status: DISCONTINUED | OUTPATIENT
Start: 2023-12-03 | End: 2023-12-03 | Stop reason: HOSPADM

## 2023-12-03 RX ORDER — LIDOCAINE HYDROCHLORIDE AND EPINEPHRINE 10; 10 MG/ML; UG/ML
INJECTION, SOLUTION INFILTRATION; PERINEURAL
Status: COMPLETED
Start: 2023-12-03 | End: 2023-12-03

## 2023-12-03 RX ORDER — CLINDAMYCIN PHOSPHATE 600 MG/50ML
600 INJECTION, SOLUTION INTRAVENOUS ONCE
Status: COMPLETED | OUTPATIENT
Start: 2023-12-03 | End: 2023-12-03

## 2023-12-03 RX ORDER — ONDANSETRON HYDROCHLORIDE 2 MG/ML
4 INJECTION, SOLUTION INTRAVENOUS ONCE
Status: DISCONTINUED | OUTPATIENT
Start: 2023-12-03 | End: 2023-12-03 | Stop reason: HOSPADM

## 2023-12-03 RX ORDER — KETOROLAC TROMETHAMINE 15 MG/ML
15 INJECTION, SOLUTION INTRAMUSCULAR; INTRAVENOUS ONCE
Status: COMPLETED | OUTPATIENT
Start: 2023-12-03 | End: 2023-12-03

## 2023-12-03 RX ORDER — ONDANSETRON HYDROCHLORIDE 2 MG/ML
4 INJECTION, SOLUTION INTRAVENOUS ONCE AS NEEDED
Status: COMPLETED | OUTPATIENT
Start: 2023-12-03 | End: 2023-12-03

## 2023-12-03 RX ORDER — ACETAMINOPHEN 325 MG/1
650 TABLET ORAL EVERY 6 HOURS PRN
Status: DISCONTINUED | OUTPATIENT
Start: 2023-12-03 | End: 2023-12-03 | Stop reason: HOSPADM

## 2023-12-03 RX ORDER — PROPOFOL 10 MG/ML
INJECTION, EMULSION INTRAVENOUS AS NEEDED
Status: DISCONTINUED | OUTPATIENT
Start: 2023-12-03 | End: 2023-12-03

## 2023-12-03 RX ORDER — LIDOCAINE HYDROCHLORIDE 10 MG/ML
0.1 INJECTION INFILTRATION; PERINEURAL ONCE
Status: DISCONTINUED | OUTPATIENT
Start: 2023-12-03 | End: 2023-12-03 | Stop reason: HOSPADM

## 2023-12-03 RX ADMIN — ALBUTEROL SULFATE 2.5 MG: 2.5 SOLUTION RESPIRATORY (INHALATION) at 19:15

## 2023-12-03 RX ADMIN — LIDOCAINE HYDROCHLORIDE 80 MG: 20 INJECTION INTRAVENOUS at 17:56

## 2023-12-03 RX ADMIN — IOHEXOL 75 ML: 350 INJECTION, SOLUTION INTRAVENOUS at 04:52

## 2023-12-03 RX ADMIN — AMPICILLIN SODIUM AND SULBACTAM SODIUM 3 G: 2; 1 INJECTION, POWDER, FOR SOLUTION INTRAMUSCULAR; INTRAVENOUS at 18:15

## 2023-12-03 RX ADMIN — HYDROMORPHONE HYDROCHLORIDE 0.2 MG: 1 INJECTION, SOLUTION INTRAMUSCULAR; INTRAVENOUS; SUBCUTANEOUS at 19:01

## 2023-12-03 RX ADMIN — ACETAMINOPHEN 650 MG: 325 TABLET ORAL at 04:59

## 2023-12-03 RX ADMIN — KETOROLAC TROMETHAMINE 30 MG: 30 INJECTION, SOLUTION INTRAMUSCULAR; INTRAVENOUS at 20:07

## 2023-12-03 RX ADMIN — CHLORHEXIDINE GLUCONATE 0.12% ORAL RINSE 15 ML: 1.2 LIQUID ORAL at 12:15

## 2023-12-03 RX ADMIN — DEXAMETHASONE SODIUM PHOSPHATE 10 MG: 4 INJECTION, SOLUTION INTRA-ARTICULAR; INTRALESIONAL; INTRAMUSCULAR; INTRAVENOUS; SOFT TISSUE at 18:05

## 2023-12-03 RX ADMIN — Medication 1 L/MIN: at 19:30

## 2023-12-03 RX ADMIN — AMPICILLIN SODIUM AND SULBACTAM SODIUM 3 G: 2; 1 INJECTION, POWDER, FOR SOLUTION INTRAMUSCULAR; INTRAVENOUS at 12:40

## 2023-12-03 RX ADMIN — ESMOLOL HYDROCHLORIDE 30 MG: 10 INJECTION, SOLUTION INTRAVENOUS at 18:29

## 2023-12-03 RX ADMIN — CLINDAMYCIN PHOSPHATE 600 MG: 600 INJECTION, SOLUTION INTRAVENOUS at 04:00

## 2023-12-03 RX ADMIN — SODIUM CHLORIDE, POTASSIUM CHLORIDE, SODIUM LACTATE AND CALCIUM CHLORIDE 100 ML/HR: 600; 310; 30; 20 INJECTION, SOLUTION INTRAVENOUS at 19:47

## 2023-12-03 RX ADMIN — ONDANSETRON 4 MG: 2 INJECTION INTRAMUSCULAR; INTRAVENOUS at 19:36

## 2023-12-03 RX ADMIN — SODIUM CHLORIDE, POTASSIUM CHLORIDE, SODIUM LACTATE AND CALCIUM CHLORIDE 75 ML/HR: 600; 310; 30; 20 INJECTION, SOLUTION INTRAVENOUS at 12:15

## 2023-12-03 RX ADMIN — SUGAMMADEX 200 MG: 100 INJECTION, SOLUTION INTRAVENOUS at 19:14

## 2023-12-03 RX ADMIN — VANCOMYCIN HYDROCHLORIDE 1000 MG: 1 INJECTION, SOLUTION INTRAVENOUS at 06:32

## 2023-12-03 RX ADMIN — FENTANYL CITRATE 50 MCG: 50 INJECTION, SOLUTION INTRAMUSCULAR; INTRAVENOUS at 18:11

## 2023-12-03 RX ADMIN — CLINDAMYCIN PHOSPHATE 600 MG: 600 INJECTION, SOLUTION INTRAVENOUS at 16:18

## 2023-12-03 RX ADMIN — ONDANSETRON 4 MG: 2 INJECTION INTRAMUSCULAR; INTRAVENOUS at 19:00

## 2023-12-03 RX ADMIN — KETOROLAC TROMETHAMINE 15 MG: 15 INJECTION, SOLUTION INTRAMUSCULAR; INTRAVENOUS at 09:03

## 2023-12-03 RX ADMIN — ESMOLOL HYDROCHLORIDE 40 MG: 10 INJECTION, SOLUTION INTRAVENOUS at 18:45

## 2023-12-03 RX ADMIN — ESMOLOL HYDROCHLORIDE 30 MG: 10 INJECTION, SOLUTION INTRAVENOUS at 19:18

## 2023-12-03 RX ADMIN — ROCURONIUM BROMIDE 50 MG: 10 INJECTION INTRAVENOUS at 17:58

## 2023-12-03 RX ADMIN — ESMOLOL HYDROCHLORIDE 30 MG: 10 INJECTION, SOLUTION INTRAVENOUS at 17:59

## 2023-12-03 RX ADMIN — FENTANYL CITRATE 50 MCG: 50 INJECTION, SOLUTION INTRAMUSCULAR; INTRAVENOUS at 18:35

## 2023-12-03 RX ADMIN — LIDOCAINE HYDROCHLORIDE AND EPINEPHRINE: 10; 10 INJECTION, SOLUTION INFILTRATION; PERINEURAL at 10:15

## 2023-12-03 RX ADMIN — ESMOLOL HYDROCHLORIDE 40 MG: 10 INJECTION, SOLUTION INTRAVENOUS at 19:16

## 2023-12-03 RX ADMIN — ESMOLOL HYDROCHLORIDE 30 MG: 10 INJECTION, SOLUTION INTRAVENOUS at 19:17

## 2023-12-03 RX ADMIN — PROPOFOL 30 MG: 10 INJECTION, EMULSION INTRAVENOUS at 18:18

## 2023-12-03 RX ADMIN — PROPOFOL 150 MG: 10 INJECTION, EMULSION INTRAVENOUS at 17:56

## 2023-12-03 ASSESSMENT — COGNITIVE AND FUNCTIONAL STATUS - GENERAL
MOBILITY SCORE: 24
DAILY ACTIVITIY SCORE: 24
PATIENT BASELINE BEDBOUND: NO
MOBILITY SCORE: 24

## 2023-12-03 ASSESSMENT — ACTIVITIES OF DAILY LIVING (ADL)
DRESSING YOURSELF: INDEPENDENT
WALKS IN HOME: INDEPENDENT
TOILETING: INDEPENDENT
PATIENT'S MEMORY ADEQUATE TO SAFELY COMPLETE DAILY ACTIVITIES?: YES
JUDGMENT_ADEQUATE_SAFELY_COMPLETE_DAILY_ACTIVITIES: YES
ADEQUATE_TO_COMPLETE_ADL: YES
ASSISTIVE_DEVICE: OTHER (COMMENT)
HEARING - LEFT EAR: FUNCTIONAL
BATHING: INDEPENDENT
GROOMING: INDEPENDENT
HEARING - RIGHT EAR: FUNCTIONAL
FEEDING YOURSELF: INDEPENDENT

## 2023-12-03 ASSESSMENT — PAIN SCALES - GENERAL
PAINLEVEL_OUTOF10: 8
PAINLEVEL_OUTOF10: 4
PAINLEVEL_OUTOF10: 10 - WORST POSSIBLE PAIN
PAINLEVEL_OUTOF10: 8
PAINLEVEL_OUTOF10: 6
PAINLEVEL_OUTOF10: 8
PAINLEVEL_OUTOF10: 0 - NO PAIN
PAINLEVEL_OUTOF10: 8

## 2023-12-03 ASSESSMENT — LIFESTYLE VARIABLES
HAVE YOU EVER FELT YOU SHOULD CUT DOWN ON YOUR DRINKING: NO
REASON UNABLE TO ASSESS: YES
HAVE PEOPLE ANNOYED YOU BY CRITICIZING YOUR DRINKING: NO
EVER FELT BAD OR GUILTY ABOUT YOUR DRINKING: NO
EVER HAD A DRINK FIRST THING IN THE MORNING TO STEADY YOUR NERVES TO GET RID OF A HANGOVER: NO

## 2023-12-03 ASSESSMENT — ENCOUNTER SYMPTOMS
CHILLS: 1
COUGH: 1
SINUS PRESSURE: 1

## 2023-12-03 ASSESSMENT — PAIN DESCRIPTION - DESCRIPTORS
DESCRIPTORS: ACHING;DISCOMFORT
DESCRIPTORS: DISCOMFORT;ACHING

## 2023-12-03 ASSESSMENT — PAIN - FUNCTIONAL ASSESSMENT
PAIN_FUNCTIONAL_ASSESSMENT: 0-10
PAIN_FUNCTIONAL_ASSESSMENT: UNABLE TO SELF-REPORT
PAIN_FUNCTIONAL_ASSESSMENT: 0-10

## 2023-12-03 ASSESSMENT — COLUMBIA-SUICIDE SEVERITY RATING SCALE - C-SSRS
1. IN THE PAST MONTH, HAVE YOU WISHED YOU WERE DEAD OR WISHED YOU COULD GO TO SLEEP AND NOT WAKE UP?: NO
6. HAVE YOU EVER DONE ANYTHING, STARTED TO DO ANYTHING, OR PREPARED TO DO ANYTHING TO END YOUR LIFE?: NO
2. HAVE YOU ACTUALLY HAD ANY THOUGHTS OF KILLING YOURSELF?: NO
2. HAVE YOU ACTUALLY HAD ANY THOUGHTS OF KILLING YOURSELF?: NO
1. IN THE PAST MONTH, HAVE YOU WISHED YOU WERE DEAD OR WISHED YOU COULD GO TO SLEEP AND NOT WAKE UP?: NO
6. HAVE YOU EVER DONE ANYTHING, STARTED TO DO ANYTHING, OR PREPARED TO DO ANYTHING TO END YOUR LIFE?: NO

## 2023-12-03 ASSESSMENT — PAIN DESCRIPTION - PAIN TYPE
TYPE: ACUTE PAIN
TYPE: ACUTE PAIN

## 2023-12-03 ASSESSMENT — PAIN DESCRIPTION - LOCATION
LOCATION: JAW
LOCATION: JAW

## 2023-12-03 NOTE — ANESTHESIA PROCEDURE NOTES
Airway  Date/Time: 12/3/2023 5:59 PM  Urgency: elective      Staffing  Performed: resident   Authorized by: Roman Cain MD    Performed by: Ronald Felix MD  Patient location during procedure: OR    Indications and Patient Condition  Indications for airway management: anesthesia  Spontaneous Ventilation: absent  Sedation level: deep  Preoxygenated: yes  Patient position: sniffing  MILS maintained throughout  Mask difficulty assessment: 1 - vent by mask  Planned trial extubation    Final Airway Details  Final airway type: endotracheal airway      Successful airway: ETT  Cuffed: yes   Successful intubation technique: direct laryngoscopy  Facilitating devices/methods: intubating stylet  Endotracheal tube insertion site: oral  Blade: Darrell  Blade size: #3  ETT size (mm): 7.0  Cormack-Lehane Classification: grade I - full view of glottis  Placement verified by: chest auscultation   Measured from: lips  ETT to lips (cm): 22  Number of attempts at approach: 1  Number of other approaches attempted: 0

## 2023-12-03 NOTE — H&P
"History Of Present Illness  Renetta Waldron is a 63 y.o. female presenting with osteomyelitis of anterior mandible. Pt was transferred from [place] ED for evaluation for osteomyelitis in anterior aspect of mandible. She had implant placed in August 2023, which lasted one week, then was removed due to infection. She lost several other teeth in the region from infection and has periodically been treated for infection by her dentist. Endorses swelling in anterior aspect of chin w/ associated pain.        Past Medical History  Hx of pneumonia, chronic bronchitis    Surgical History  Moh's surgery     Social History  She has no history on file for tobacco use, alcohol use, and drug use.    Family History  No family history on file.     Allergies  Levonorgestrel-ethinyl estrad, Penicillins, Sulfa (sulfonamide antibiotics), Ciprofloxacin, Azithromycin, Cephalexin, Esomeprazole, Levofloxacin, and Omeprazole      Review of Systems   Constitutional:  Positive for chills.   HENT:  Positive for congestion, dental problem, postnasal drip and sinus pressure.    Respiratory:  Positive for cough.    All other systems reviewed and are negative.     Physical Exam  HENT:      Head:      Comments: Erythema on anterior aspect of chin associated with firm swelling  FOM soft, non-elevated  Oropharynx clear no visual obstruction  Missing anterior and L mandibular teeth  Eyes:      Extraocular Movements: Extraocular movements intact.   Cardiovascular:      Rate and Rhythm: Normal rate.   Musculoskeletal:         General: Normal range of motion.      Cervical back: Normal range of motion.   Skin:     General: Skin is warm.   Neurological:      General: No focal deficit present.      Mental Status: She is alert.   Psychiatric:      Comments: Flight of ideas, difficult to interview         Last Recorded Vitals  Pulse 86, temperature 36.4 °C (97.5 °F), temperature source Skin, resp. rate 16, height 1.676 m (5' 6\"), weight 68 kg (150 lb), SpO2 95 " "%.     Relevant Results  WBC: 11.1  CT Impression:  IMPRESSION:  1. Active subacute osteomyelitis of the mental aspect of mandible and  left mandibular body with an intraosseous abscess in the mental  aspect the mandible measuring 1.6 cm x 1.6 and 0.6 cm it drains  anteriorly through the cortex resulting in a subperiosteal abscess  measuring 0.8 cm x 0.7 cm x 0.5 cm and adjacent cellulitis.      2. Biapical paraseptal and centrilobular emphysema.     Assessment/Plan      Pt is a 64 yo immunocompetent female presenting with osteomyelitis of anterior mandible. She has hx of implant placement and extractions in the region which likely began this process in August of 2023. Pt complains of off and on swelling, pain since the implant which has been treated w/ abx. She endorses allergy to several different antibiotics and is currently on Clindamycin. She has no significant medical history, denies bisphosphonate use, denies biologics, or hx of immunosuppressants.     CT scan reveals osteomyelitis on anterior mandible w/ intraosseous abscess and associated subperiosteal abscess. Pt will require debridement w/ likely resection in operating room and placement of recon plate.     #Osteomyelitis of anterior mandible requiring debridement in operating room. Discussed risks, benefits, and alternatives with the patient and all questions answered to a reasonable degree of medical certainty. Dr. Salcedo was in to discuss any questions the patient had.    Prior to procedure, pt is stating she does not want to lose her mandibular implant. She does not care if infection if left behind or necrotic bone is surrounding the implant. She stated \"I fully understand the risks associated with leaving this bone\" and understands this is against our recommendation. Risks of leaving infected bone included spread of infection leading to losing larger parts of her jaw, skin, muscle, fascia, and spread of infection with significant risks even " including death. The patient ultimately decided that if needed we will have to take out her implant, but urged us to make every attempt to avoid it. We assured her we will make every attempt at avoiding taking out the implant but could not promise anything. Pt understood and informed consent was obtained.    Bowen Feliciano  [facility] PGY1

## 2023-12-03 NOTE — CONSULTS
Consults    Reason For Consult  Osteomyelitis of jaw    History Of Present Illness  Renetta Waldron is a 63 y.o. female presenting with osteomyelitis of mandible. Pt was transferred from [place] ED for evaluation for osteomyelitis in anterior aspect of mandible. She had implant placed in August 2023, which lasted one week, then was removed due to infection. She lost several other teeth in the region from infection and has periodically been treated for infection by her dentist. Endorses swelling in anterior aspect of chin w/ associated pain.     Past Medical History  She has no past medical history on file.    Surgical History  She has no past surgical history on file.     Social History  She has no history on file for tobacco use, alcohol use, and drug use.    Family History  No family history on file.     Allergies  Levonorgestrel-ethinyl estrad, Penicillins, Sulfa (sulfonamide antibiotics), Ciprofloxacin, Azithromycin, Cephalexin, Esomeprazole, Levofloxacin, and Omeprazole    Review of Systems   Constitutional:  Positive for chills.   HENT:  Positive for congestion, dental problem, postnasal drip and sinus pressure.    Respiratory:  Positive for cough.    All other systems reviewed and are negative.       Physical Exam  HENT:      Head:      Comments: Erythema on anterior aspect of chin associated with firm swelling  FOM soft, non-elevated  Oropharynx clear no visual obstruction  Missing anterior and L mandibular teeth  Eyes:      Extraocular Movements: Extraocular movements intact.   Cardiovascular:      Rate and Rhythm: Normal rate.   Musculoskeletal:         General: Normal range of motion.      Cervical back: Normal range of motion.   Skin:     General: Skin is warm.   Neurological:      General: No focal deficit present.      Mental Status: She is alert.   Psychiatric:      Comments: Flight of ideas, difficult to interview        Last Recorded Vitals  Pulse 86, temperature 36.4 °C (97.5 °F), temperature source  "Skin, resp. rate 16, height 1.676 m (5' 6\"), weight 68 kg (150 lb), SpO2 95 %.    Relevant Results  WBC: 11.1  CT Impression:  IMPRESSION:  1. Active subacute osteomyelitis of the mental aspect of mandible and  left mandibular body with an intraosseous abscess in the mental  aspect the mandible measuring 1.6 cm x 1.6 and 0.6 cm it drains  anteriorly through the cortex resulting in a subperiosteal abscess  measuring 0.8 cm x 0.7 cm x 0.5 cm and adjacent cellulitis.      2. Biapical paraseptal and centrilobular emphysema.     Assessment/Plan     Pt is a 62 yo immunocompetent female presenting with osteomyelitis of anterior mandible. She has hx of implant placement and extractions in the region which likely began this process in August of 2023. Pt complains of off and on swelling, pain since the implant which has been treated w/ abx. She endorses allergy to several different antibiotics and is currently on Clindamycin. She has no significant medical history, denies bisphosphonate use, denies biologics, or hx of immunosuppressants.    CT scan reveals osteomyelitis on anterior mandible w/ intraosseous abscess and associated subperiosteal abscess. Pt will require debridement w/ likely resection in operating room and placement of recon plate.    #Osteomyelitis of anterior mandible  -Admission to [facility]  -Plan for debridement/resection in OR   -Consult to ID for recs regarding antibiotic regimen    Bowen Feliciano  [facility] PGY1            "

## 2023-12-03 NOTE — ED PROVIDER NOTES
HPI   Chief Complaint   Patient presents with    Jaw Pain     Pt has hx of dental work in August. Pt was seen by GI and dentist and cleared.        This is a 63-year-old female who presents with complaint of swelling pain and redness to her chin.  The patient reports that back in August she had dental extraction which resulted in a post extraction infection.  She has been on numerous antibiotics and is followed up with her dentist.  She claims that since July she had a small lump on her chin and her dentist told her that the body would absorb it.  The patient reports that for the past 2 days the swelling has worsened and now she has got pain going into her throat.  She just recently finished a prescription of doxycycline for sinus infection.  She rates her pain to her chin a 9 out of a 10.  She has been taken Tylenol with no relief.      History provided by:  Patient and medical records                      [place] Coma Scale Score: 15                  Patient History   History reviewed. No pertinent past medical history.  History reviewed. No pertinent surgical history.  No family history on file.  Social History     Tobacco Use    Smoking status: Not on file    Smokeless tobacco: Not on file   Substance Use Topics    Alcohol use: Not on file    Drug use: Not on file       Physical Exam   ED Triage Vitals [12/03/23 0249]   Temp Heart Rate Resp BP   36.6 °C (97.9 °F) 97 16 138/81      SpO2 Temp Source Heart Rate Source Patient Position   95 % Temporal Monitor --      BP Location FiO2 (%)     -- --       Physical Exam  Vitals and nursing note reviewed.   Constitutional:       Appearance: Normal appearance. She is normal weight.   HENT:      Head: Normocephalic and atraumatic.      Jaw: There is normal jaw occlusion. Swelling present.        Comments: Swelling to the chin, no submental swelling no lymphadenopathy trachea midline     Right Ear: Hearing, tympanic membrane, ear canal and external ear normal.      Left  Ear: Hearing, tympanic membrane, ear canal and external ear normal.      Nose: Nose normal.      Mouth/Throat:      Lips: Pink.      Mouth: Mucous membranes are moist. No lacerations or oral lesions.      Dentition: No dental tenderness or dental caries.      Pharynx: Oropharynx is clear. Uvula midline. No uvula swelling.      Tonsils: No tonsillar exudate or tonsillar abscesses.      Comments: Patient is missing most of the teeth on the lower jaw, the remaining teeth on the right side show no evidence of any infection, there is no gingival swelling, no sign of [place]ona angina or acute necrotizing ulcerative gingivitis or signs of an abscess.  Musculoskeletal:      Cervical back: Normal range of motion and neck supple.   Lymphadenopathy:      Cervical: No cervical adenopathy.   Skin:     General: Skin is warm.      Capillary Refill: Capillary refill takes less than 2 seconds.      Findings: No erythema.   Neurological:      General: No focal deficit present.      Mental Status: She is alert and oriented to person, place, and time. Mental status is at baseline.   Psychiatric:         Mood and Affect: Mood normal.         Behavior: Behavior normal.         Thought Content: Thought content normal.         Judgment: Judgment normal.         ED Course & MDM   ED Course as of 12/03/23 0552   Sun Dec 03, 2023   0521 Temperature 36.6 pulse 97, respirations 16, /81, pulse ox is 95% on room air  The patient was given Toradol 50 mg IV push Tylenol 650 mg p.o., clindamycin 600 mg IV piggyback.  White count 11.9 hemoglobin 15.6 hematocrit 47.6, platelet count was 280, metabolic panel anion gap of 9 otherwise unremarkable.   [RS]      ED Course User Index  [RS] Ky Roa PA-C         Diagnoses as of 12/03/23 0552   Jaw pain, non-TMJ   Acute osteomyelitis of mandible       Medical Decision Making  Temperature 36.6 pulse 97, respirations 16, /81, pulse ox is 95% on room air  The patient was given Toradol 50 mg IV  push Tylenol 650 mg p.o., clindamycin 600 mg IV piggyback.  White count 11.9 hemoglobin 15.6 hematocrit 47.6, platelet count was 280, metabolic panel anion gap of 9 otherwise unremarkable.    CT scan of the mandible with IV contrast reads active subacute osteomyelitis of the mental aspect of the mandible and left mandibular body with intraosseous abscess in the mental aspect of the mandible measuring 1.6 x 1.6 0.6 cm that drains anterior to the cortex resulting in a subperiosteal abscess measuring 0.8 x 0.70 5.5 cm with adjacent cellulitis, biapical paraseptal and central lobar emphysema.    I updated the patient of the results we have paged out to [facility].  I spoke with Dr. Randhawa from [facility] at 0557 hrs.  And reviewed the results of the workup.  The patient states that she prefers to go downtown.  IV ordered 1 g of vancomycin IV piggyback.  Dr. Salcedo states he will meet the patient in the ED for evaluation.  I did speak with Dr. Granados ED attending at [facility] and gave report.  The patient will be transferred downtown for evaluation by [facility].  Procedure  Procedures     Ky Roa PA-C  12/03/23 0553       Ky Roa PA-C  12/03/23 0609

## 2023-12-03 NOTE — PROGRESS NOTES
Pharmacy Medication History Review    Renetta Waldron is a 63 y.o. female admitted for Osteomyelitis of mandible. Pharmacy reviewed the patient's [id number] medications and allergies for accuracy.    The list below reflects the updated PTA list. Comments regarding how patient may be taking medications differently can be found in the Admit Orders Activity  Prior to Admission Medications   Prescriptions Last Dose Informant Patient Reported? Taking?   Lactobacillus acidophilus (PROBIOTIC ACIDOPHILUS ORAL) 11/29/2023 Self Yes Yes   Sig: Take 1 capsule by mouth once daily.   acetaminophen (Tylenol) 325 mg tablet 12/2/2023 Self Yes Yes   Sig: Take 1-2 tablets (325-650 mg) by mouth every 6 hours if needed for mild pain (1 - 3).   cholecalciferol (Vitamin D-3) 50 MCG (2000 UT) tablet 12/2/2023 Self Yes Yes   Sig: Take 1 tablet (50 mcg) by mouth once daily.   dextromethorphan-guaifenesin (Robitussin DM)  mg/5 mL oral liquid 12/2/2023 Self Yes Yes   Sig: Take 5 mL by mouth 3 times a day as needed for cough.      Facility-Administered Medications Last Administration Doses Remaining   lidocaine-epinephrine (Xylocaine W/EPI) 1 %-1:100,000 injection 5 mL None recorded 1           The list below reflects the updated allergy list. Please review each documented allergy for additional clarification and justification.  Allergies  Reviewed by Cony Mosher PharmD on 12/3/2023        Severity Reactions Comments    Levonorgestrel-ethinyl Estrad High Shortness of breath     Penicillins High Anaphylaxis, Hives, Rash     Sulfa (sulfonamide Antibiotics) High Anaphylaxis, Shortness of breath, Rash     Ciprofloxacin Medium Palpitations     Azithromycin Low Palpitations, Rash     Cephalexin Low Hives, Palpitations, Rash     Esomeprazole Low Anxiety     Levofloxacin Low Rash psychosis    Omeprazole Low Hives, Palpitations             Patient accepts M2B at discharge. Pharmacy has been updated to  [facility]armacy.    Sources used to complete the med history include the outpatient dispense report, OARRS, [facility] record, 11/28 office visit note, and patient interview who was a good historian.     Below are additional concerns with the patient's PTA list.  None    Cony Mosher, Rigo  [facility] PGY1 Pharmacy Resident  [facility]  Please reach out via [employer] Secure Chat for questions, or if no response call [phone] or [employer] \"MedRec\"    "

## 2023-12-03 NOTE — ED PROVIDER NOTES
HPI   Chief Complaint   Patient presents with    Abscess     Dental Abscess       HPI  63-year-old female with history of dental implant August 2023 who presents to transfer from outside hospital for concern for mandibular infection.  Patient reports multiple complications following her dental implant this past August including multiple teeth extractions, a bridge removal, and she has had persistent pain and swelling of the jaw refractory to multiple courses of antibiotics.  She presents the pain and swelling of her chin has progressed this week after finishing a course of doxycycline last Monday.  Imaging at outside hospital revealed findings concerning for osteomyelitis with intraosseous abscess of the mandible.  Patient denies any difficulty swallowing or shortness of breath.                  [place] Coma Scale Score: 15                  Patient History   No past medical history on file.  No past surgical history on file.  No family history on file.  Social History     Tobacco Use    Smoking status: Not on file    Smokeless tobacco: Not on file   Substance Use Topics    Alcohol use: Not on file    Drug use: Not on file       Physical Exam   ED Triage Vitals [12/03/23 0749]   Temp Heart Rate Resp BP   36.4 °C (97.5 °F) 86 16 --      SpO2 Temp Source Heart Rate Source Patient Position   95 % Skin Monitor Sitting      BP Location FiO2 (%)     Left arm --       Physical Exam  Vitals and nursing note reviewed.   Constitutional:       Appearance: Normal appearance.   HENT:      Head: Normocephalic.      Mouth/Throat:      Mouth: Mucous membranes are moist.      Comments: Largely edentulous of the lower teeth, no tonsillar hypertrophy or exudates, no evidence of peritonsillar abscess, anterior midline mandible is tender and firm to palpation without sublingual fullness, no pooling of secretions.  Eyes:      Conjunctiva/sclera: Conjunctivae normal.   Cardiovascular:      Rate and Rhythm: Normal rate.   Pulmonary:       Effort: Pulmonary effort is normal.   Abdominal:      General: Abdomen is flat.   Musculoskeletal:         General: No deformity. Normal range of motion.      Cervical back: Normal range of motion.   Skin:     General: Skin is warm and dry.   Neurological:      Mental Status: She is alert and oriented to person, place, and time.   Psychiatric:         Mood and Affect: Mood normal.         ED Course & MDM   Diagnoses as of 12/03/23 0840   Osteomyelitis of mandible       Medical Decision Making  63-year-old female with history of dental implant August 2023 complicated by multiple intraoral infections with dental extractions and bridge removal who presents as transfer from outside hospital over concern for osteomyelitis of the mandible.  On exam, patient's vitals are normal, she is in no acute distress and nontoxic-appearing, no pooling of secretions noted in oropharynx is clear without masses.  She does have a firm and tender anterior midline mandible, no sublingual fullness, normal range of motion of the neck.  Patient had CT imaging at the outside hospital that showed mandibular intraosseous abscess for which OMFS was consulted.  Patient's clindamycin was redosed appropriately after receiving last dose at 0400 at outside hospital.  Her pain was controlled.    Per OMFS, patient will be taken to OR today for debridement.  Patient remained stable, her pain was controlled and she was admitted to OMFS' service.    Procedure  Procedures     Ronald Tracey DO  Resident  12/03/23 7569

## 2023-12-03 NOTE — ANESTHESIA PREPROCEDURE EVALUATION
Patient: Renetta Waldron    Procedure Information       Date/Time: 12/03/23 1420    Procedures:       Debridement Face (Bilateral)      Reconstruction Mandible (Bilateral)    Location: [facility] OR 04 / [facility]    Surgeons: Kane Salcedo DMD            Per H and P: Renetta Waldron is a 63 y.o. female presenting with osteomyelitis of anterior mandible. Pt was transferred from [facility] for evaluation for osteomyelitis in anterior aspect of mandible. She had implant placed in August 2023, which lasted one week, then was removed due to infection. She lost several other teeth in the region from infection and has periodically been treated for infection by her dentist. Endorses swelling in anterior aspect of chin w/ associated pain.        Past Medical History  Hx of pneumonia, chronic bronchitis(mild COPD)     Surgical History  Moh's surgery, Tubal ligation, breast augmentation and removal of implants.  Current Outpatient Medications   Medication Instructions    acetaminophen (Tylenol) 325 mg tablet 1-2 tablets, oral, Every 6 hours PRN    cholecalciferol (VITAMIN D-3) 50 mcg, oral, Daily    dextromethorphan-guaifenesin (Robitussin DM)  mg/5 mL oral liquid 5 mL, oral, 3 times daily PRN    Lactobacillus acidophilus (PROBIOTIC ACIDOPHILUS ORAL) 1 capsule, oral, Daily      Allergies   Allergen Reactions    Levonorgestrel-Ethinyl Estrad Shortness of breath    Sulfa (Sulfonamide Antibiotics) Anaphylaxis, Shortness of breath and Rash    Ciprofloxacin Palpitations    Azithromycin Palpitations and Rash    Cephalexin Hives, Palpitations and Rash    Esomeprazole Anxiety    Levofloxacin Rash     psychosis    Omeprazole Hives and Palpitations        Chemistry    Lab Results   Component Value Date/Time     12/03/2023 0355    K 4.0 12/03/2023 0355     12/03/2023 0355    CO2 30 12/03/2023 0355    BUN 12 12/03/2023 0355    CREATININE 0.77 12/03/2023 0355    Lab Results   Component Value Date/Time     CALCIUM 8.9 12/03/2023 0355    ALKPHOS 81 09/25/2023 2248    AST 12 09/25/2023 2248    ALT 7 09/25/2023 2248    BILITOT 0.6 09/25/2023 2248          Lab Results   Component Value Date/Time    WBC 11.1 12/03/2023 0355    HGB 15.6 12/03/2023 0355    HCT 47.6 (H) 12/03/2023 0355     12/03/2023 0355     Lab Results   Component Value Date/Time    PROTIME 11.1 12/03/2023 0909    INR 1.0 12/03/2023 0909     No results found for this or any previous visit (from the past 4464 hour(s)).  No results found for this or any previous visit from the past 1095 days.         Relevant Problems   Anesthesia (within normal limits)      Cardiovascular  Hx of tachycardia. States the ECHO, stress test and other testing was normal.      Endocrine (within normal limits)      GI  Various GI symptoms. Pancreatic cyst      /Renal (within normal limits)      Neuro/Psych (within normal limits)      Pulmonary   (+) Chronic obstructive pulmonary disease (CMS/HCC)      GI/Hepatic (within normal limits)      Infectious Disease   (+) Osteomyelitis (CMS/HCC)   (+) Osteomyelitis of mandible      Other   (+) Osteomyelitis (CMS/HCC)   (+) Osteomyelitis of mandible       Clinical information reviewed:    Allergies  Meds               NPO Detail:  No data recorded     Physical Exam    Airway  Mallampati: IV  TM distance: >3 FB     Cardiovascular   Rhythm: regular  Rate: normal     Dental   Comments: Missing some lower teeth.   Pulmonary   Breath sounds clear to auscultation     Abdominal        Patient states she has a DNR order.  She gave me permission to resuscitate her during anesthesia with ACLS interventions, in the presence of Dr. Ronald Felix MD. CHRISTIANE Cain MD     Anesthesia Plan    ASA 2     general     intravenous induction   Anesthetic plan and risks discussed with patient.    Plan discussed with resident.    CHRISTIANE Cain MD performed Preop evaluation.

## 2023-12-03 NOTE — ED NOTES
Vancomycin stopped per Maryam Hensley NP for transport. [place] being [place] [facility] [place].     Job Avina RN  12/03/23 0613

## 2023-12-03 NOTE — NURSING NOTE
14:15 pt transferred into room 9034 in stable condition. All patient belongings with patient. Falls protocol and use of call light reviewed with patient and patient verbalized understanding. Vital signs obtained. Plan of care discussed with patient and team.

## 2023-12-04 ENCOUNTER — APPOINTMENT (OUTPATIENT)
Dept: RADIOLOGY | Facility: HOSPITAL | Age: 63
DRG: 142 | End: 2023-12-04
Payer: OTHER GOVERNMENT

## 2023-12-04 LAB
ANION GAP SERPL CALC-SCNC: 18 MMOL/L (ref 10–20)
BUN SERPL-MCNC: 13 MG/DL (ref 6–23)
CALCIUM SERPL-MCNC: 8.9 MG/DL (ref 8.6–10.6)
CHLORIDE SERPL-SCNC: 103 MMOL/L (ref 98–107)
CO2 SERPL-SCNC: 23 MMOL/L (ref 21–32)
CREAT SERPL-MCNC: 0.75 MG/DL (ref 0.5–1.05)
ERYTHROCYTE [DISTWIDTH] IN BLOOD BY AUTOMATED COUNT: 14 % (ref 11.5–14.5)
GFR SERPL CREATININE-BSD FRML MDRD: 90 ML/MIN/1.73M*2
GLUCOSE SERPL-MCNC: 120 MG/DL (ref 74–99)
HCT VFR BLD AUTO: 42.4 % (ref 36–46)
HGB BLD-MCNC: 13.7 G/DL (ref 12–16)
MCH RBC QN AUTO: 28.2 PG (ref 26–34)
MCHC RBC AUTO-ENTMCNC: 32.3 G/DL (ref 32–36)
MCV RBC AUTO: 87 FL (ref 80–100)
NRBC BLD-RTO: 0 /100 WBCS (ref 0–0)
PLATELET # BLD AUTO: 281 X10*3/UL (ref 150–450)
POTASSIUM SERPL-SCNC: 5.2 MMOL/L (ref 3.5–5.3)
RBC # BLD AUTO: 4.85 X10*6/UL (ref 4–5.2)
SODIUM SERPL-SCNC: 139 MMOL/L (ref 136–145)
WBC # BLD AUTO: 12.4 X10*3/UL (ref 4.4–11.3)

## 2023-12-04 PROCEDURE — 80048 BASIC METABOLIC PNL TOTAL CA: CPT

## 2023-12-04 PROCEDURE — 36415 COLL VENOUS BLD VENIPUNCTURE: CPT

## 2023-12-04 PROCEDURE — 2500000004 HC RX 250 GENERAL PHARMACY W/ HCPCS (ALT 636 FOR OP/ED)

## 2023-12-04 PROCEDURE — 2500000001 HC RX 250 WO HCPCS SELF ADMINISTERED DRUGS (ALT 637 FOR MEDICARE OP): Performed by: STUDENT IN AN ORGANIZED HEALTH CARE EDUCATION/TRAINING PROGRAM

## 2023-12-04 PROCEDURE — 2500000001 HC RX 250 WO HCPCS SELF ADMINISTERED DRUGS (ALT 637 FOR MEDICARE OP)

## 2023-12-04 PROCEDURE — 70355 PANORAMIC X-RAY OF JAWS: CPT | Performed by: STUDENT IN AN ORGANIZED HEALTH CARE EDUCATION/TRAINING PROGRAM

## 2023-12-04 PROCEDURE — 1100000001 HC PRIVATE ROOM DAILY

## 2023-12-04 PROCEDURE — 70355 PANORAMIC X-RAY OF JAWS: CPT | Mod: FY

## 2023-12-04 PROCEDURE — 99222 1ST HOSP IP/OBS MODERATE 55: CPT | Performed by: INTERNAL MEDICINE

## 2023-12-04 PROCEDURE — 85027 COMPLETE CBC AUTOMATED: CPT

## 2023-12-04 PROCEDURE — 2500000004 HC RX 250 GENERAL PHARMACY W/ HCPCS (ALT 636 FOR OP/ED): Performed by: STUDENT IN AN ORGANIZED HEALTH CARE EDUCATION/TRAINING PROGRAM

## 2023-12-04 RX ORDER — GUAIFENESIN/DEXTROMETHORPHAN 100-10MG/5
5 SYRUP ORAL 3 TIMES DAILY PRN
Status: DISCONTINUED | OUTPATIENT
Start: 2023-12-04 | End: 2023-12-07 | Stop reason: HOSPADM

## 2023-12-04 RX ORDER — DIPHENHYDRAMINE HCL 25 MG
25 CAPSULE ORAL EVERY 6 HOURS PRN
Status: DISCONTINUED | OUTPATIENT
Start: 2023-12-04 | End: 2023-12-07 | Stop reason: HOSPADM

## 2023-12-04 RX ADMIN — KETOROLAC TROMETHAMINE 30 MG: 30 INJECTION, SOLUTION INTRAMUSCULAR; INTRAVENOUS at 06:06

## 2023-12-04 RX ADMIN — CHLORHEXIDINE GLUCONATE 0.12% ORAL RINSE 15 ML: 1.2 LIQUID ORAL at 20:43

## 2023-12-04 RX ADMIN — ACETAMINOPHEN 650 MG: 650 SOLUTION ORAL at 08:26

## 2023-12-04 RX ADMIN — ACETAMINOPHEN 650 MG: 650 SOLUTION ORAL at 22:54

## 2023-12-04 RX ADMIN — CHLORHEXIDINE GLUCONATE 0.12% ORAL RINSE 15 ML: 1.2 LIQUID ORAL at 08:26

## 2023-12-04 RX ADMIN — ACETAMINOPHEN 650 MG: 650 SOLUTION ORAL at 14:50

## 2023-12-04 RX ADMIN — SODIUM CHLORIDE, POTASSIUM CHLORIDE, SODIUM LACTATE AND CALCIUM CHLORIDE 75 ML/HR: 600; 310; 30; 20 INJECTION, SOLUTION INTRAVENOUS at 08:26

## 2023-12-04 RX ADMIN — AMPICILLIN SODIUM AND SULBACTAM SODIUM 3 G: 2; 1 INJECTION, POWDER, FOR SOLUTION INTRAMUSCULAR; INTRAVENOUS at 20:42

## 2023-12-04 RX ADMIN — AMPICILLIN SODIUM AND SULBACTAM SODIUM 3 G: 2; 1 INJECTION, POWDER, FOR SOLUTION INTRAMUSCULAR; INTRAVENOUS at 00:28

## 2023-12-04 RX ADMIN — ACETAMINOPHEN 650 MG: 650 SOLUTION ORAL at 00:15

## 2023-12-04 RX ADMIN — AMPICILLIN SODIUM AND SULBACTAM SODIUM 3 G: 2; 1 INJECTION, POWDER, FOR SOLUTION INTRAMUSCULAR; INTRAVENOUS at 08:26

## 2023-12-04 RX ADMIN — AMPICILLIN SODIUM AND SULBACTAM SODIUM 3 G: 2; 1 INJECTION, POWDER, FOR SOLUTION INTRAMUSCULAR; INTRAVENOUS at 14:51

## 2023-12-04 SDOH — SOCIAL STABILITY: SOCIAL INSECURITY: ARE THERE ANY APPARENT SIGNS OF INJURIES/BEHAVIORS THAT COULD BE RELATED TO ABUSE/NEGLECT?: NO

## 2023-12-04 SDOH — SOCIAL STABILITY: SOCIAL INSECURITY: ABUSE: ADULT

## 2023-12-04 SDOH — SOCIAL STABILITY: SOCIAL INSECURITY: DOES ANYONE TRY TO KEEP YOU FROM HAVING/CONTACTING OTHER FRIENDS OR DOING THINGS OUTSIDE YOUR HOME?: NO

## 2023-12-04 SDOH — SOCIAL STABILITY: SOCIAL INSECURITY: DO YOU FEEL ANYONE HAS EXPLOITED OR TAKEN ADVANTAGE OF YOU FINANCIALLY OR OF YOUR PERSONAL PROPERTY?: NO

## 2023-12-04 SDOH — SOCIAL STABILITY: SOCIAL INSECURITY: HAVE YOU HAD THOUGHTS OF HARMING ANYONE ELSE?: NO

## 2023-12-04 SDOH — SOCIAL STABILITY: SOCIAL INSECURITY: ARE YOU OR HAVE YOU BEEN THREATENED OR ABUSED PHYSICALLY, EMOTIONALLY, OR SEXUALLY BY ANYONE?: NO

## 2023-12-04 SDOH — SOCIAL STABILITY: SOCIAL INSECURITY: WERE YOU ABLE TO COMPLETE ALL THE BEHAVIORAL HEALTH SCREENINGS?: YES

## 2023-12-04 SDOH — SOCIAL STABILITY: SOCIAL INSECURITY: DO YOU FEEL UNSAFE GOING BACK TO THE PLACE WHERE YOU ARE LIVING?: NO

## 2023-12-04 SDOH — SOCIAL STABILITY: SOCIAL INSECURITY: HAS ANYONE EVER THREATENED TO HURT YOUR FAMILY OR YOUR PETS?: NO

## 2023-12-04 ASSESSMENT — PAIN SCALES - GENERAL
PAINLEVEL_OUTOF10: 4
PAINLEVEL_OUTOF10: 4
PAINLEVEL_OUTOF10: 8
PAINLEVEL_OUTOF10: 2
PAINLEVEL_OUTOF10: 8

## 2023-12-04 ASSESSMENT — COGNITIVE AND FUNCTIONAL STATUS - GENERAL
MOBILITY SCORE: 24
DAILY ACTIVITIY SCORE: 24

## 2023-12-04 ASSESSMENT — ACTIVITIES OF DAILY LIVING (ADL): LACK_OF_TRANSPORTATION: PATIENT DECLINED

## 2023-12-04 ASSESSMENT — LIFESTYLE VARIABLES
SKIP TO QUESTIONS 9-10: 1
HOW OFTEN DO YOU HAVE 6 OR MORE DRINKS ON ONE OCCASION: NEVER
AUDIT-C TOTAL SCORE: 0
HOW MANY STANDARD DRINKS CONTAINING ALCOHOL DO YOU HAVE ON A TYPICAL DAY: PATIENT DOES NOT DRINK
HOW OFTEN DO YOU HAVE A DRINK CONTAINING ALCOHOL: NEVER
AUDIT-C TOTAL SCORE: 0

## 2023-12-04 ASSESSMENT — PAIN - FUNCTIONAL ASSESSMENT
PAIN_FUNCTIONAL_ASSESSMENT: 0-10

## 2023-12-04 ASSESSMENT — PATIENT HEALTH QUESTIONNAIRE - PHQ9
SUM OF ALL RESPONSES TO PHQ9 QUESTIONS 1 & 2: 0
1. LITTLE INTEREST OR PLEASURE IN DOING THINGS: NOT AT ALL
2. FEELING DOWN, DEPRESSED OR HOPELESS: NOT AT ALL

## 2023-12-04 ASSESSMENT — PAIN DESCRIPTION - ORIENTATION: ORIENTATION: RIGHT

## 2023-12-04 NOTE — POST-PROCEDURE NOTE
"1913-Patient out of OR; VSS, pt c/o SOB, started Albuterol breathing treatment. Oral gauze changed for small amount bloody drainage (normal finding per surgical team, OK to change gauze PRN)  1914-Patient took off breathing treatment; states \"I'm done with this\", currently satting 90% on RA; 1L NC placed  1915-Patient rating pain 8/10, pt currently refusing IV Dilaudid, Oxycodone and Tylenol. Pt requesting PO Tramadol, anesthesia paged for order  1928-Patient given sip of water, no aspiration noted but pt c/o difficulty swallowing. Per RN judgement, PO Tramadol not appropriate at this time. Page to anesthesia and surgical team to determine if pt is OK candidate for IV Toradol; notified OK for Toradol, order to be placed  2005-Oral gauze changed; pt provided with gauze packs. pt stating nasal cannula is causing \"burning\" of her nose, notified pt that is normal and to keep cannula in her nose as it is helping her oxygen saturation  2007-Patient given 30mg IV Toradol as ordered  2015-Patient stating no relief of pain, continues to refuse IV pharmacological pain control, patient states to this RN \"you are annoying, you're just too much\". pt provided ice packs for jaw and notified pt to tell RN if she needs anything  2035-Report given to LT 9 RN  2050-Transport at bedside to take pt to LT9 room 34  "

## 2023-12-04 NOTE — PROGRESS NOTES
"Renetta Waldron is a 63 y.o. female on day 1 of admission presenting with Osteomyelitis of mandible.    Subjective   Pt reports doing well overnight. She is hemodynamically stable. Denies any bleeding or excessive pain. No trouble breathing or swallowing.       Objective     Physical Exam  Constitutional:       Appearance: Normal appearance.   HENT:      Head:      Comments: Intraoral incisions intact, no dehiscence  CNV3 slight hypoesthesia in L region  CNV3 intact to crude pressure  Swelling minimal  FOM soft, but swollen consistent with surgical timeline  Eyes:      Extraocular Movements: Extraocular movements intact.   Cardiovascular:      Rate and Rhythm: Normal rate.   Pulmonary:      Effort: Pulmonary effort is normal.   Musculoskeletal:         General: Normal range of motion.      Cervical back: Normal range of motion.   Skin:     General: Skin is warm.   Neurological:      General: No focal deficit present.      Mental Status: She is alert.   Psychiatric:         Mood and Affect: Mood normal.         Behavior: Behavior normal.         Last Recorded Vitals  Blood pressure 108/68, pulse 84, temperature 36.4 °C (97.5 °F), resp. rate 18, height 1.676 m (5' 6\"), weight 68 kg (150 lb), SpO2 91 %.  Intake/Output last 3 Shifts:  I/O last 3 completed shifts:  In: 1432.1 (21 mL/kg) [I.V.:1382.1 (20.3 mL/kg); IV Piggyback:50]  Out: 10 (0.1 mL/kg) [Blood:10]  Weight: 68 kg            Assessment/Plan   Principal Problem:    Osteomyelitis of mandible  Active Problems:    Chronic obstructive pulmonary disease (CMS/HCC)    Pt is 1 day s/p debridement of mandibular osteomyelitis and placement of recon plate. Pt is doing well, denies any acute issues. Swelling consistent with surgical timeline.     -Full liquid diet  -Continue IV Unasyn  -Continue current pain regimen  -Elevated HOB 30 deg  -Chlorhexidine 0.12% rinse TID  -Yankauer suction bedside  -Pending ID recs will plan for either discharge or continued admission "     Bowen Feliciano  [facility] PGY1  #[id number]    John Feliciano, ANDREAS

## 2023-12-04 NOTE — PROGRESS NOTES
Discharge Planning Note:      Renetta Waldron is a 63 y.o. female on day 1 of admission presenting with Osteomyelitis of mandible.    Received a call from the [facility] [phone] sent updates to [phone] pending ID recommendations.         Maria Elena De Oliveira RN TCC

## 2023-12-04 NOTE — OP NOTE
Debridement Anterior Mandible Face (B) Operative Note     Date: 12/3/2023  OR Location: [facility] OR    Name: Renetta Waldron, : 1960, Age: 63 y.o., MRN: [id number], Sex: female    Diagnosis  * No Diagnosis Codes entered * * No Diagnosis Codes entered *     Procedures  Debridement Anterior Mandible Face  38468 - TX DEBRIDEMENT BONE MUSCLE &/FASCIA 20 SQ CM/<      Surgeons      * Kane Salcedo - Primary    Resident/Fellow/Other Assistant:  Surgeon(s) and Role:     * Mark Kwon DDS - Resident - Assisting    Procedure Summary  Anesthesia: General  ASA: II  Anesthesia Staff: Anesthesiologist: Meagan Trinidad MD; Roman Cain MD  Anesthesia Resident: Ronald Felix MD  Estimated Blood Loss: 10mL  Intra-op Medications:   Medication Name Total Dose   clindamycin in D5W (Cleocin) IVPB 600 mg 50 mL              Anesthesia Record               Intraprocedure I/O Totals          Intake    lactated Ringer's infusion 500.00 mL    Total Intake 500 mL       Output    Est. Blood Loss 10 mL    Total Output 10 mL       Net    Net Volume 490 mL          Specimen:   ID Type Source Tests Collected by Time   1 : NECROTIC BONE OF MANDIBLE Tissue BONE RESECTION SURGICAL PATHOLOGY EXAM Kane Salcedo, [facility] 12/3/2023 1855   A : ANTERIOR MANDIBLE ABSCESS Swab ABSCESS FUNGAL CULTURE/SMEAR, TISSUE/WOUND CULTURE/SMEAR Kane Salcedo, [facility] 12/3/2023 1821   B : NECROTIC BONE OF MANDIBLE Tissue BONE RESECTION FUNGAL CULTURE/SMEAR, TISSUE/WOUND CULTURE/SMEAR Kane Salcedo, [facility] 12/3/2023 1857        Staff:   Circulator: Manda Ramos RN; Jazlyn Dominguez RN  Relief Circulator: Ct Warren RN  Relief Scrub: Antonina Benitez  Scrub Person: Cierra Meraz         Drains and/or Catheters: * None in log *    Tourniquet Times:         Implants:  Implants       Type Name Action Serial No.      Screw SCREW, 2 X 10 CROSS PIN - [id number] Implanted      Screw SCREW, 2 X 12 CROSS PIN - [id number] Implanted      Screw  SCREW, 2 X 14 CROSS PIN - [id number] Implanted      Screw DRILL, ALLISON 1.6 X 58 TWIST - [id number] Used, Not Implanted      Screw PLATE, STRT W/BAR RENETTA LOCKING, 6H - [id number] Implanted               Findings: Osteomyelitis of the anterior mandible     Indications: Renetta Waldron is an 63 y.o. female who is having surgery for Osteomyelitis of anterior mandible.      The patient was seen in the preoperative area. The risks, benefits, complications, treatment options, non-operative alternatives, expected recovery and outcomes were discussed with the patient. The possibilities of reaction to medication, pulmonary aspiration, injury to surrounding structures, bleeding, recurrent infection, the need for additional procedures, failure to diagnose a condition, and creating a complication requiring transfusion or operation were discussed with the patient. The patient concurred with the proposed plan, giving informed consent.  The site of surgery was properly noted/marked if necessary per policy. The patient has been actively warmed in preoperative area. Preoperative antibiotics have been ordered and given within 1 hours of incision. Venous thrombosis prophylaxis are not indicated.    Procedure Details:   The patient was met outside the operating room; and after discussing last minute questions, the patient was brought to the operating room and was placed in the supine position on the operative table. Anesthesia team took over care of the patient and placed standard monitoring and induced general anesthesia without complications. The patient was then intubated via Oral endotracheal tube and was secured to the head of the bed. A time-out was performed at this time, and she was prepped and draped in the standard sterile oral and maxillofacial surgery fashion in the face as well as the oral cavity. A throat pack was placed in the posterior oropharynx, and 8 cc of 2% Lidocaine w/ 1:100k epic was administered.  Bovi used to  create crestal incision with DB release at the left posterior molars and the right molars. #9 periosteal elevator used to release FTMP flap exposing the anterior mandible with care to protect the mental nerves. After adequate exposure and skeletinizing of the mental nerves, a Bridger reconstruction plate was placed on the mandible with two holes past the mental foramine bilaterally. Plate was secured and holes populated. Right most anterior tooth was extracted using elevators and forceps. A pineapple mahi with copious normal saline and rongeurs used to remove all infected bone and all sharp edges were removed. Bone was removed until bleeding bone was encountered. Irricept used to irrigate out sites. 3-0 vicryl used to re approximate soft tissue and achieve watertight closure.  At this time, the patient's care was turned over to anesthesia team for emergence and extubation.       Complications:  None; patient tolerated the procedure well.    Disposition: PACU - hemodynamically stable.  Condition: stable       Attending Attestation:     Kane Salcedo  Phone Number: [phone]      Mark Kwon MD, DDS  Oral and Maxillofacial Surgery PGY-5

## 2023-12-04 NOTE — ANESTHESIA POSTPROCEDURE EVALUATION
Patient: Renetta Waldron    Procedure Summary       Date: 12/03/23 Room / Location: [facility] OR 04 / [facility]    Anesthesia Start: 1742 Anesthesia Stop: 1933    Procedure: Debridement Anterior Mandible Face (Bilateral) Diagnosis:     Surgeons: Kane Salcedo DMD Responsible Provider: Roman Cain MD    Anesthesia Type: general ASA Status: 2            Anesthesia Type: No value filed.    Vitals Value Taken Time   /72 12/03/23 1927   Temp 36 12/03/23 1934   Pulse 99 12/03/23 1931   Resp 18 12/03/23 1931   SpO2 100 % 12/03/23 1931   Vitals shown include unvalidated device data.    Anesthesia Post Evaluation    Patient location during evaluation: PACU  Patient participation: complete - patient participated  Level of consciousness: awake  Pain management: adequate  Airway patency: patent  Cardiovascular status: acceptable  Respiratory status: acceptable  Hydration status: acceptable  Postoperative Nausea and Vomiting: none      No notable events documented.

## 2023-12-04 NOTE — CONSULTS
Inpatient consult to Infectious Diseases  Consult performed by: Emily Mcneill DO  Consult ordered by: Bradley Gama MD      Referred by     Primary MD: Judit Curran DO    Reason For Consult: mandible OM    History Of Present Illness  Renetta Waldron is a 63 y.o. woman pmhx sig for R lower molar dental extraction and implant 8/7/23 w/ reported post-procedure infection after which the implant was removed 8/10. Pt states after this she developed an infection of her LEFT side implant as well. Per notes had been treated w/ multiple regimens of abx with pain in her anterior jaw since ~July. She reports oral abx w/ clindamycin, levofloxacin, and metronidazole prior to admission. She has a hx of hives/rash with amox-clav but reports she has previously tolerated amoxicillin alone, and has had at least 2 PCN skin tests which were neg, last w/  Allergy/Immunology 9/19/23. Pt presented to [facility] 12/3/23 with 2 days of anterior jaw swelling and throat pain. Recently completed rx for doxycline for sinus infection. On admission pt afebrile and HDS. WBC 11k. CT mandible w/ contrast showing reactive subacute OM w/ interosseous abscess draining to a subperiosteal abscess a/ adjacent cellulitis. Started on vancomycin and clindamycin. Transferred to [facility] and transitioned to amp-sulbactam.     Went to OR for debridement, resection of infected bone, and placement of reconstructive plate. Intra-op cx from 12/3 NGTD, no org on gram stain.     Post op, she reports pain that is responsive to tramadol. Denies that she ever had a cutaneous fistulous tract to her skin from her jaw. Prior to admission reports fever and chills, although also reports these have been occurring for about a year.      Past Medical History  She has a past medical history of COPD (chronic obstructive pulmonary disease) (CMS/[facility]).    GETS SOME OF HER CARE AT [facility]    Surgical History  She has a past surgical history that includes  Colonoscopy.     Social History   [employer]     Occupational History    Not on file   Tobacco Use    Smoking status: Every Day     Packs/day: .5     Types: Cigarettes    Smokeless tobacco: Never   Substance and Sexual Activity    Alcohol use: Never    Drug use: Never    Sexual activity: Defer     Travel History   Travel since 11/04/23    No documented travel since 11/04/23        Service:  Branch Years Served Period   [employer]       Comments:      Pets: unknown  Hobbies: unknown    Family History  No family history on file.  Allergies  Levonorgestrel-ethinyl estrad, Sulfa (sulfonamide antibiotics), Ciprofloxacin, Azithromycin, Cephalexin, Esomeprazole, Levofloxacin, and Omeprazole       There is no immunization history on file for this patient.  Medications  Home medications:  Facility-Administered Medications Prior to Admission   Medication Dose Route Frequency Provider Last Rate Last Admin    lidocaine-epinephrine (Xylocaine W/EPI) 1 %-1:100,000 injection 5 mL  5 mL infiltration Once John Feliciano DDS         Medications Prior to Admission   Medication Sig Dispense Refill Last Dose    acetaminophen (Tylenol) 325 mg tablet Take 1-2 tablets (325-650 mg) by mouth every 6 hours if needed for mild pain (1 - 3).   12/2/2023    cholecalciferol (Vitamin D-3) 50 MCG (2000 UT) tablet Take 1 tablet (50 mcg) by mouth once daily.   12/2/2023    dextromethorphan-guaifenesin (Robitussin DM)  mg/5 mL oral liquid Take 5 mL by mouth 3 times a day as needed for cough.   12/2/2023    Lactobacillus acidophilus (PROBIOTIC ACIDOPHILUS ORAL) Take 1 capsule by mouth once daily.   11/29/2023     Current medications:  Scheduled medications  acetaminophen, 650 mg, oral, q6h   Or  acetaminophen, 650 mg, oral, q6h  ampicillin-sulbactam, 3 g, intravenous, q6h  chlorhexidine, 15 mL, Swish & Spit, BID  enoxaparin, 30 mg, subcutaneous, q12h  traMADol, 50 mg, oral, Once      Continuous medications  lactated Ringer's, 75  mL/hr, Last Rate: 75 mL/hr (12/04/23 0826)      PRN medications  PRN medications: benzonatate, diphenhydrAMINE, ketorolac, ondansetron ODT **OR** ondansetron    Review of Systems - per HPI     Objective  Range of Vitals (last 24 hours)  Heart Rate:  []   Temp:  [35.7 °C (96.3 °F)-36.4 °C (97.5 °F)]   Resp:  [16-23]   BP: (108-157)/(61-75)   SpO2:  [86 %-98 %]   Daily Weight  12/03/23 : 68 kg (150 lb)    Body mass index is 24.21 kg/m².     Physical Exam    GEN: comfortable appearing middle aged woman, sitting up in bed, NAD  HEENT: MMM, sublingual hematoma, stitches in lower jaw; bruising on R lower jaw       Several post and bridges     RESP: CTA anteriorly, no wheezes or rhonchi, no tachypnea or cyanosis  CV: RRR, S1/S2, no appreciable murmurs  ABDOMEN: soft, nontender, BS+, nondistended  MSK: no bony deformities, no joint warmth or swelling  EXTREMITIES: warm and well-perfused, no peripheral edema  SKIN: bruising as above   NEURO: alert, answers questions appropriately, moving b/l UE and LE to command, no gross focal deficits  PSYCH: calm and cooperative       Relevant Results  Outside Hospital Results  Yes -   Labs  Results from last 72 hours   Lab Units 12/04/23  0516 12/03/23  0355   WBC AUTO x10*3/uL 12.4* 11.1   HEMOGLOBIN g/dL 13.7 15.6   HEMATOCRIT % 42.4 47.6*   PLATELETS AUTO x10*3/uL 281 280   NEUTROS PCT AUTO %  --  68.6   LYMPHS PCT AUTO %  --  23.1   MONOS PCT AUTO %  --  6.0   EOS PCT AUTO %  --  1.5     Results from last 72 hours   Lab Units 12/04/23  0516 12/03/23  0355   SODIUM mmol/L 139 139   POTASSIUM mmol/L 5.2 4.0   CHLORIDE mmol/L 103 104   CO2 mmol/L 23 30   BUN mg/dL 13 12   CREATININE mg/dL 0.75 0.77   GLUCOSE mg/dL 120* 98   CALCIUM mg/dL 8.9 8.9   ANION GAP mmol/L 18 9*   EGFR mL/min/1.73m*2 90 87         Estimated Creatinine Clearance: 71.9 mL/min (by C-G formula based on SCr of 0.75 mg/dL).  Sedimentation Rate   Date Value Ref Range Status   09/19/2023 1 0 - 30 mm/h Final      "Comment:     Please note new reference ranges as of 5/9/2022.     No results found for: \"HIV1X2\", \"HIVCONF\", \"EOTLMD5DE\"  No results found for: \"HEPCABINIT\", \"HEPCAB\", \"HCVPCRQUANT\"  Microbiology    12/3 intra op cx  - NGTD    Imaging    CT 12/3    IMPRESSION:  1. Active subacute osteomyelitis of the mental aspect of mandible and  left mandibular body with an intraosseous abscess in the mental  aspect the mandible measuring 1.6 cm x 1.6 and 0.6 cm it drains  anteriorly through the cortex resulting in a subperiosteal abscess  measuring 0.8 cm x 0.7 cm x 0.5 cm and adjacent cellulitis.      2. Biapical paraseptal and centrilobular emphysema.       Assessment/Plan     Renetta Waldron is a 64 y/o woman pmhx sig for tooth extraction and implant in Aug 2023 s/p removal of implant c/b mutiple infections, admitted to [facility] 12/4 with mandibular OM and periosteal abscess s/p bone resection and recon plate. Currently afebrile, HDS, cx prelim neg. She has been seen by Allergy/Immunology, most recently 9/19/23 for evaluation of multiple abx allergies, but has had neg PCN skin testing. She has tolerated amox-clav in the past, but most describes hives/intolerance with a recent regimen of this; it's not clear if this is due to the clavulanate component. For now, she seems to be tolerating amp-sulbactam and would continue this w/ plan for discharge on an IV regimen pending her cx results.       #Mandibular OM  #Multiple abx adverse effects vs allergy      Recommendations:  -Cont IV amp-sulbactam 3gm q6H for now pending cx results  -Pt will need PICC line for likely long-term IV abx due to intolerance of multiple oral regimens   -Will follow up on intra op cx   -Please ask social work to coordinate w/ [facility] social workers/care coordination with regards to home IV abx set up      Discussed w/ Dr. Reed, will follow       Emily Mcneill DO  Infectious Disease Fellow, PGY5  Epic Chat until 5pm/Team A pager [phone]  I saw and evaluated the " patient. I personally obtained the key and critical portions of the history and physical exam or was physically present for key and critical portions performed by the resident/fellow. I reviewed the resident/fellow's documentation and discussed the patient with the resident/fellow. I agree with the resident/fellow's medical decision making as documented in the note.    John Reed MD  ID Staff

## 2023-12-05 PROCEDURE — 2500000001 HC RX 250 WO HCPCS SELF ADMINISTERED DRUGS (ALT 637 FOR MEDICARE OP): Performed by: STUDENT IN AN ORGANIZED HEALTH CARE EDUCATION/TRAINING PROGRAM

## 2023-12-05 PROCEDURE — 2500000005 HC RX 250 GENERAL PHARMACY W/O HCPCS

## 2023-12-05 PROCEDURE — 1100000001 HC PRIVATE ROOM DAILY

## 2023-12-05 PROCEDURE — 2500000004 HC RX 250 GENERAL PHARMACY W/ HCPCS (ALT 636 FOR OP/ED): Performed by: STUDENT IN AN ORGANIZED HEALTH CARE EDUCATION/TRAINING PROGRAM

## 2023-12-05 PROCEDURE — 87081 CULTURE SCREEN ONLY: CPT

## 2023-12-05 PROCEDURE — 05HY33Z INSERTION OF INFUSION DEVICE INTO UPPER VEIN, PERCUTANEOUS APPROACH: ICD-10-PCS | Performed by: INTERNAL MEDICINE

## 2023-12-05 PROCEDURE — 36569 INSJ PICC 5 YR+ W/O IMAGING: CPT

## 2023-12-05 PROCEDURE — C1751 CATH, INF, PER/CENT/MIDLINE: HCPCS

## 2023-12-05 PROCEDURE — 2780000003 HC OR 278 NO HCPCS

## 2023-12-05 RX ORDER — LIDOCAINE HYDROCHLORIDE 10 MG/ML
5 INJECTION INFILTRATION; PERINEURAL ONCE
Status: COMPLETED | OUTPATIENT
Start: 2023-12-05 | End: 2023-12-05

## 2023-12-05 RX ADMIN — CHLORHEXIDINE GLUCONATE 0.12% ORAL RINSE 15 ML: 1.2 LIQUID ORAL at 21:33

## 2023-12-05 RX ADMIN — CHLORHEXIDINE GLUCONATE 0.12% ORAL RINSE 15 ML: 1.2 LIQUID ORAL at 08:51

## 2023-12-05 RX ADMIN — LIDOCAINE HYDROCHLORIDE 5 ML: 10 INJECTION, SOLUTION INFILTRATION; PERINEURAL at 15:35

## 2023-12-05 RX ADMIN — AMPICILLIN SODIUM AND SULBACTAM SODIUM 3 G: 2; 1 INJECTION, POWDER, FOR SOLUTION INTRAMUSCULAR; INTRAVENOUS at 22:00

## 2023-12-05 RX ADMIN — ACETAMINOPHEN 650 MG: 650 SOLUTION ORAL at 18:06

## 2023-12-05 RX ADMIN — AMPICILLIN SODIUM AND SULBACTAM SODIUM 3 G: 2; 1 INJECTION, POWDER, FOR SOLUTION INTRAMUSCULAR; INTRAVENOUS at 08:51

## 2023-12-05 RX ADMIN — AMPICILLIN SODIUM AND SULBACTAM SODIUM 3 G: 2; 1 INJECTION, POWDER, FOR SOLUTION INTRAMUSCULAR; INTRAVENOUS at 01:47

## 2023-12-05 RX ADMIN — AMPICILLIN SODIUM AND SULBACTAM SODIUM 3 G: 2; 1 INJECTION, POWDER, FOR SOLUTION INTRAMUSCULAR; INTRAVENOUS at 16:22

## 2023-12-05 RX ADMIN — KETOROLAC TROMETHAMINE 30 MG: 30 INJECTION, SOLUTION INTRAMUSCULAR; INTRAVENOUS at 06:39

## 2023-12-05 ASSESSMENT — PAIN - FUNCTIONAL ASSESSMENT: PAIN_FUNCTIONAL_ASSESSMENT: 0-10

## 2023-12-05 ASSESSMENT — PAIN SCALES - GENERAL
PAINLEVEL_OUTOF10: 3
PAINLEVEL_OUTOF10: 6

## 2023-12-05 NOTE — PROCEDURES
Vascular Access Team Procedure Note     Visit Date: 12/5/2023      Patient Name: Renetta Waldron         MRN: [id number]             Procedure:PICC Line insertion       PICC - Adult 12/05/23 Single lumen Right Basilic vein (Active)   12/05/23 1440 Basilic vein   Earliest Known Present:    Placed by External Staff?:    Hand Hygiene Completed: Yes   Catheter Time Out Checklist Completed: Yes   Size (Fr): 4   Lumen Type: Single lumen   Description (optional):    Catheter to Vein Ratio Less Than 50%: Yes   Total Length (cm): 40 cm   External Length (cm): 1 cm   Orientation: Right   Site Prep: Chlorhexidine ;Usual sterile procedure followed   Local Anesthetic: Injectable   Indication: Parenteral medications   Insertion Team Members In The Room: Nurse   Initial Extremity Circumference (cm): 29 cm   Placed by: Olena Rosado   Insertion attempts: 1   Patient Tolerance: Tolerated well   Comfort Measures: Subcutaneous anesthetic   Procedure Location: Bedside   Safety Measures: Patient specific safety measures addressed with RN   Estimated Blood Loss (mL): 1 mL   Vessel Fully Compressible Proximally and Distally to Insertion Site: Yes   Brisk Blood Return Obtained and Line Draws Easily: Yes   Tip Location:    Line Confirmation: Blood return;ECG;Non-pulsatile blood flow   Lot #: [id number]   : CR BARD   PICC Line Exp Date: 12/31/24   Number of Sutures Placed:    Post Procedure Checklist: Bed at lowest level and wheels locked;Obtain all new IV tubing prior to use;Line discharge information at bedside               Site Assessment Clean;Dry;Intact 12/05/23 1606                       Olena Rosado RN  12/5/2023  4:06 PM

## 2023-12-05 NOTE — PROGRESS NOTES
Discharge Planning Note:     Sent information email to the [facility] TCC team [email or web address] for follow up care needs post discharge. Will need Unasyn IV atb therapy for six weeks and follow up with the [facility] ID team. Picc line placement prior to discharge.               Maria Elena De Oliveira RN TCC

## 2023-12-05 NOTE — PROGRESS NOTES
"Renetta Waldron is a 63 y.o. female HD 3, POD 2 for debridement and reconstruction plate for osteomyelitis of mandible.        Subjective   Pt reports doing well overnight. She is hemodynamically stable. Denies any bleeding or excessive pain. No trouble breathing or swallowing.           Objective   Physical Exam  Constitutional:       Appearance: Normal appearance.   HENT:      Head:      Comments: Intraoral incisions intact, no dehiscence  CNV3 slight hypoesthesia in L region  CNV3 intact to crude pressure  Swelling minimal  FOM soft, but swollen consistent with surgical timeline  Eyes:      Extraocular Movements: Extraocular movements intact.   Cardiovascular:      Rate and Rhythm: Normal rate.   Pulmonary:      Effort: Pulmonary effort is normal.   Musculoskeletal:         General: Normal range of motion.      Cervical back: Normal range of motion.   Skin:     General: Skin is warm. Submandibular edema consistent with post-op course.  Neurological:      General: No focal deficit present.      Mental Status: She is alert.   Psychiatric:         Mood and Affect: Mood normal.         Behavior: Behavior normal.            Last Recorded Vitals  Visit Vitals  /76 (BP Location: Left arm, Patient Position: Lying)   Pulse 92   Temp 36.8 °C (98.2 °F) (Temporal)   Resp 18   Ht 1.676 m (5' 6\")   Wt 68 kg (150 lb)   SpO2 90%   BMI 24.21 kg/m²   OB Status Postmenopausal   Smoking Status Every Day   BSA 1.78 m²      Intake/Output last 3 Shifts:    Intake/Output Summary (Last 24 hours) at 12/5/2023 0824  Last data filed at 12/5/2023 0600  Gross per 24 hour   Intake 2921.25 ml   Output --   Net 2921.25 ml               Assessment/Plan   Principal Problem:    Osteomyelitis of mandible  Active Problems:    Chronic obstructive pulmonary disease (CMS/HCC)     Pt is POD 2 day s/p debridement of mandibular osteomyelitis and placement of recon plate. Pt is doing well, denies any acute issues. Swelling consistent with surgical " timeline. Patient was seen by ID, who recommended PICC line for continued antibiotics.  [facility] the [facility] for possible [facility]. Recommended placing picc line prior to discharge.      - continue full liquid diet  - continue IV Unasyn  - continue current pain regimen  - elevated HOB 30 deg  - chlorhexidine 0.12% rinse TID  - yankauer suction bedside  - place picc line prior to discharge  -  to reach out to [facility] for home services with IV antibiotic therapy and next steps    Hoang Stone DMD  FS PGY-1  Available on Epic Chat  Pager [phone]

## 2023-12-06 PROBLEM — M86.9 OSTEOMYELITIS (MULTI): Status: RESOLVED | Noted: 2023-12-03 | Resolved: 2023-12-06

## 2023-12-06 PROBLEM — M27.2 OSTEOMYELITIS OF MANDIBLE: Status: RESOLVED | Noted: 2023-12-03 | Resolved: 2023-12-06

## 2023-12-06 PROCEDURE — 96372 THER/PROPH/DIAG INJ SC/IM: CPT | Performed by: STUDENT IN AN ORGANIZED HEALTH CARE EDUCATION/TRAINING PROGRAM

## 2023-12-06 PROCEDURE — 2500000004 HC RX 250 GENERAL PHARMACY W/ HCPCS (ALT 636 FOR OP/ED): Performed by: STUDENT IN AN ORGANIZED HEALTH CARE EDUCATION/TRAINING PROGRAM

## 2023-12-06 PROCEDURE — 97165 OT EVAL LOW COMPLEX 30 MIN: CPT | Mod: GO

## 2023-12-06 PROCEDURE — 2500000001 HC RX 250 WO HCPCS SELF ADMINISTERED DRUGS (ALT 637 FOR MEDICARE OP): Performed by: STUDENT IN AN ORGANIZED HEALTH CARE EDUCATION/TRAINING PROGRAM

## 2023-12-06 PROCEDURE — 97161 PT EVAL LOW COMPLEX 20 MIN: CPT | Mod: GP

## 2023-12-06 PROCEDURE — 1100000001 HC PRIVATE ROOM DAILY

## 2023-12-06 PROCEDURE — 2500000001 HC RX 250 WO HCPCS SELF ADMINISTERED DRUGS (ALT 637 FOR MEDICARE OP)

## 2023-12-06 RX ORDER — CHLORHEXIDINE GLUCONATE ORAL RINSE 1.2 MG/ML
15 SOLUTION DENTAL AS NEEDED
Qty: 473 ML | Refills: 0 | Status: SHIPPED | OUTPATIENT
Start: 2023-12-06

## 2023-12-06 RX ORDER — ONDANSETRON 4 MG/1
4 TABLET, ORALLY DISINTEGRATING ORAL EVERY 8 HOURS PRN
Qty: 20 TABLET | Refills: 0 | Status: SHIPPED | OUTPATIENT
Start: 2023-12-06 | End: 2023-12-16

## 2023-12-06 RX ADMIN — AMPICILLIN SODIUM AND SULBACTAM SODIUM 3 G: 2; 1 INJECTION, POWDER, FOR SOLUTION INTRAMUSCULAR; INTRAVENOUS at 14:03

## 2023-12-06 RX ADMIN — ACETAMINOPHEN 650 MG: 650 SOLUTION ORAL at 05:43

## 2023-12-06 RX ADMIN — ACETAMINOPHEN 650 MG: 325 TABLET ORAL at 18:15

## 2023-12-06 RX ADMIN — AMPICILLIN SODIUM AND SULBACTAM SODIUM 3 G: 2; 1 INJECTION, POWDER, FOR SOLUTION INTRAMUSCULAR; INTRAVENOUS at 05:43

## 2023-12-06 RX ADMIN — ENOXAPARIN SODIUM 30 MG: 30 INJECTION SUBCUTANEOUS at 20:00

## 2023-12-06 RX ADMIN — CHLORHEXIDINE GLUCONATE 0.12% ORAL RINSE 15 ML: 1.2 LIQUID ORAL at 20:02

## 2023-12-06 RX ADMIN — KETOROLAC TROMETHAMINE 30 MG: 30 INJECTION, SOLUTION INTRAMUSCULAR; INTRAVENOUS at 00:08

## 2023-12-06 RX ADMIN — CHLORHEXIDINE GLUCONATE 0.12% ORAL RINSE 15 ML: 1.2 LIQUID ORAL at 08:25

## 2023-12-06 RX ADMIN — AMPICILLIN SODIUM AND SULBACTAM SODIUM 3 G: 2; 1 INJECTION, POWDER, FOR SOLUTION INTRAMUSCULAR; INTRAVENOUS at 19:58

## 2023-12-06 RX ADMIN — ENOXAPARIN SODIUM 30 MG: 30 INJECTION SUBCUTANEOUS at 07:00

## 2023-12-06 RX ADMIN — KETOROLAC TROMETHAMINE 30 MG: 30 INJECTION, SOLUTION INTRAMUSCULAR; INTRAVENOUS at 21:06

## 2023-12-06 ASSESSMENT — COGNITIVE AND FUNCTIONAL STATUS - GENERAL
MOBILITY SCORE: 24
DAILY ACTIVITIY SCORE: 24
MOBILITY SCORE: 24

## 2023-12-06 ASSESSMENT — PAIN SCALES - GENERAL
PAINLEVEL_OUTOF10: 2
PAINLEVEL_OUTOF10: 6
PAINLEVEL_OUTOF10: 0 - NO PAIN
PAINLEVEL_OUTOF10: 3

## 2023-12-06 ASSESSMENT — PAIN - FUNCTIONAL ASSESSMENT
PAIN_FUNCTIONAL_ASSESSMENT: 0-10
PAIN_FUNCTIONAL_ASSESSMENT: 0-10

## 2023-12-06 ASSESSMENT — ACTIVITIES OF DAILY LIVING (ADL)
BATHING_ASSISTANCE: INDEPENDENT
ADL_ASSISTANCE: INDEPENDENT

## 2023-12-06 NOTE — PROGRESS NOTES
"Renetta Waldron is a 63 y.o. female on day 3 of admission presenting with Osteomyelitis of mandible.    Subjective   Pt is a 64 yo f 3 days s/p osteomyelitis debridement and placement of recon plate on anterior mandible. Pt is doing well post-operatively, no complaints of acute pain, nausea, vomiting. Pt feels ready for discharge.       Objective   Physical Exam  Constitutional:       Appearance: Normal appearance.   HENT:      Head:      Comments: Intraoral incisions intact, no dehiscence  CNV3 slight hypoesthesia in L region  CNV3 intact to crude pressure  Swelling minimal  FOM soft, but swollen consistent with surgical timeline  Eyes:      Extraocular Movements: Extraocular movements intact.   Cardiovascular:      Rate and Rhythm: Normal rate.   Pulmonary:      Effort: Pulmonary effort is normal.   Musculoskeletal:         General: Normal range of motion.      Cervical back: Normal range of motion.   Skin:     General: Skin is warm. Submandibular edema consistent with post-op course.  Neurological:      General: No focal deficit present.      Mental Status: She is alert.   Psychiatric:         Mood and Affect: Mood normal.         Behavior: Behavior normal.       Last Recorded Vitals  Blood pressure 137/72, pulse 73, temperature 36.5 °C (97.7 °F), resp. rate 18, height 1.676 m (5' 6\"), weight 68 kg (150 lb), SpO2 93 %.  Intake/Output last 3 Shifts:  I/O last 3 completed shifts:  In: 2920 (42.9 mL/kg) [P.O.:1420; I.V.:900 (13.2 mL/kg); IV Piggyback:600]  Out: 0 (0 mL/kg)   Weight: 68 kg       Assessment/Plan   Active Problems:    Osteomyelitis (CMS/HCC)    Chronic obstructive pulmonary disease (CMS/HCC)    Pt has had uneventful post-operative course s/p debridement of anterior mandible w/ recon plate placement this past Ahsan 12/3. ID was consulted who recommended PICC placement and IV unasyn 3G Q6H for 6 weeks course (12/3/23 - end date 01/14/2024) and to plan to [facility]up with [facility] ID team outptatient.     Pt was " evaluated by PT/OT in . Must also receive PICC education prior to discharge. Pt's care will be transferred to the [facility] infectious disease team. Her transfer of care has been co-managed with several teams including ID, social work, PT/OT, and the nursing care coordinator to ensure she is setup at the [facility].    OMFS discharge recs:  -Continue IV abx per ID via PICC  -Full liquid diet  -Chlorhexidine 0.12% TID, swish and spit  -Sleep w/ HOB elevated 30 deg  -Analgesia managed w/ OTC, pt denies stronger pain medication and has tolerated pain well  -1 week follow up in outpatient clinic at [phone] ([phone])    Please reach out to FS with any questions @ #[phone]      John Feliciano DDS

## 2023-12-06 NOTE — DISCHARGE SUMMARY
Discharge Diagnosis  Osteomyelitis of mandible    Issues Requiring Follow-Up  Osteomyelitis and PICC line on R arm    Test Results Pending At Discharge  Pending Labs       Order Current Status    Staphylococcus Aureus/MRSA Colonization, Culture - PICC Only In process    Surgical Pathology Exam In process    Fungal Culture/Smear Preliminary result    Fungal Culture/Smear Preliminary result    Tissue/Wound Culture/Smear Preliminary result    Tissue/Wound Culture/Smear Preliminary result            Hospital Course   Pt originally presented to ED for concerns of tooth infection. CT scan revealed osteomyelitic changes requiring urgent intervention. Same day, we took patient to the OR for debridement and placement of recon plate. Pt has spent the remainder of her time in the hospital for IV abx while we arrange for [facility]/home care transition for PICC line    Pertinent Physical Exam At Time of Discharge  Physical Exam  Constitutional:       Appearance: Normal appearance.   HENT:      Head:      Comments: Intraoral incisions intact, no dehiscence  CNV3 slight hypoesthesia in L region  CNV3 intact to crude pressure  Swelling minimal  FOM soft, but swollen consistent with surgical timeline  Eyes:      Extraocular Movements: Extraocular movements intact.   Cardiovascular:      Rate and Rhythm: Normal rate.   Pulmonary:      Effort: Pulmonary effort is normal.   Musculoskeletal:         General: Normal range of motion.      Cervical back: Normal range of motion.   Skin:     General: Skin is warm. Submandibular edema consistent with post-op course.  Neurological:      General: No focal deficit present.      Mental Status: She is alert.   Psychiatric:         Mood and Affect: Mood normal.         Behavior: Behavior normal.     Home Medications     Medication List      ASK your doctor about these medications     acetaminophen 325 mg tablet; Commonly known as: Tylenol   cholecalciferol 50 MCG (2000 UT) tablet; Commonly known as:  Vitamin D-3   dextromethorphan-guaifenesin  mg/5 mL oral liquid; Commonly known   as: Robitussin DM   PROBIOTIC ACIDOPHILUS ORAL       Outpatient Follow-Up  Future Appointments   Date Time Provider Department Center   3/22/2024  7:00 AM Daisy Oates MD [id number]         Pt is being coordinated to transition her care to ID at the [facility] for P[facility] line management and delivery of IV abx. ID also recommended weekly labs of CBC/d, crp, and RFP while on IV abx. Working with  for PICC line instructions to be reviewed with the patient as well as to make sure VA team is on the same page with us for her future care.    ID recommends IV unasyn 3G Q6H for 6 weeks course (12/3/23 - end date 01/14/2024) with plan to followup with [facility] ID team outpt for chronic oral antibiotics suppression as well as weekly labs of CBC/d, CRP, and RFP    From OMFS perspective, pt is cleared for discharge pending home setup for PICC line is adequate.    oJhn Feliciano DDS

## 2023-12-06 NOTE — CARE PLAN
The patient's goals for the shift include      The clinical goals for the shift include patient will discharge

## 2023-12-06 NOTE — PROGRESS NOTES
Physical Therapy    Physical Therapy Evaluation    Patient Name: Renetta Waldron  MRN: [id number]  Today's Date: 12/6/2023   Time Calculation  Start Time: 1124  Stop Time: 1139  Time Calculation (min): 15 min    Assessment/Plan   PT Assessment  Medical Staff Made Aware: Yes  End of Session Communication: Bedside nurse  Assessment Comment: pt is independent at baseline and tolerated session well. no mobility concerns or further PT needs.  End of Session Patient Position: Bed, 3 rail up  IP OR SWING BED PT PLAN  Inpatient or Swing Bed: Inpatient  PT Plan  PT Plan: PT Eval only  PT Eval Only Reason: At baseline function  PT Frequency: PT eval only  PT Discharge Recommendations: No further acute PT  PT - OK to Discharge: Yes      Subjective   General Visit Information:  General  Reason for Referral: s/p osteomyelitis debridement and placement of recon plate on anterior mandible.  Past Medical History Relevant to Rehab: COPD; infection since tooth surgeries  Prior to Session Communication: Bedside nurse  Patient Position Received: Bed, 3 rail up  General Comment: pt agreeable to therapy and RN cleared  Home Living:  Home Living  Type of Home: House  Lives With: Alone  Home Adaptive Equipment: None  Home Layout: Two level, Stairs to alternate level with rails  Alternate Level Stairs-Number of Steps: 12  Home Access: Stairs to enter with rails  Entrance Stairs-Number of Steps: 2  Prior Level of Function:  Prior Function Per Pt/Caregiver Report  Level of [place]: Independent with ADLs and functional transfers, Independent with homemaking with ambulation  ADL Assistance: Independent  Homemaking Assistance: Independent  Ambulatory Assistance: Independent  Precautions:  Precautions  Medical Precautions: Fall precautions  Vital Signs:       Objective   Pain:  Pain Assessment  Pain Assessment: 0-10  Pain Score: 2  Cognition:  Cognition  Overall Cognitive Status: Within Functional Limits    General Assessments:      Activity  Tolerance  Endurance: Endurance does not limit participation in activity    Sensation  Light Touch:  (numbness in lower lip)    Static Sitting Balance  Static Sitting-Level of Assistance: Independent  Functional Assessments:  Bed Mobility  Bed Mobility: Yes  Bed Mobility 1  Bed Mobility 1: Supine to sitting, Sitting to supine  Level of Assistance 1: Independent    Transfers  Transfer: Yes  Transfer 1  Transfer From 1: Sit to, Stand to  Transfer to 1: Stand, Sit  Technique 1: Sit to stand, Stand to sit  Transfer Level of Assistance 1: Independent    Ambulation/Gait Training  Ambulation/Gait Training Performed: Yes  Ambulation/Gait Training 1  Surface 1: Level tile  Device 1: No device  Assistance 1: Independent  Comments/Distance (ft) 1: 50' x2    Stairs  Stairs: Yes  Stairs  Rails 1: Right  Device 1: Railing  Assistance 1: Independent  Comment/Number of Steps 1: 8 steps, alternating foot pattern  Extremity/Trunk Assessments:  RLE   RLE : Within Functional Limits  LLE   LLE : Within Functional Limits  Outcome Measures:  [facility] Basic Mobility  Turning from your back to your side while in a flat bed without using bedrails: None  Moving from lying on your back to sitting on the side of a flat bed without using bedrails: None  Moving to and from bed to chair (including a wheelchair): None  Standing up from a chair using your arms (e.g. wheelchair or bedside chair): None  To walk in hospital room: None  Climbing 3-5 steps with railing: None  Basic Mobility - Total Score: 24    Encounter Problems       Encounter Problems (Active)       Pain - Adult              Education Documentation  Precautions, taught by Leora Burton PT at 12/6/2023 11:50 AM.  Learner: Patient  Readiness: Acceptance  Method: Explanation  Response: Verbalizes Understanding  Comment: edu on role of PT    Mobility Training, taught by Leora Burton PT at 12/6/2023 11:50 AM.  Learner: Patient  Readiness: Acceptance  Method: Explanation  Response:  Verbalizes Understanding  Comment: edu on role of PT    Education Comments  No comments found.

## 2023-12-06 NOTE — DISCHARGE INSTRUCTIONS
-Education materials shared regarding PICC line, osteomyelitis, and the procedure you had performed

## 2023-12-06 NOTE — SIGNIFICANT EVENT
ID final assessment:  Renetta Waldron is a 62 y/o woman pmhx sig for tooth extraction and implant in Aug 2023 s/p removal of implant c/b mutiple infections, admitted to [facility] 12/4 with mandibular OM and periosteal abscess s/p bone resection and recon plate. Currently afebrile, HDS, cx prelim neg. She has been seen by Allergy/Immunology, most recently 9/19/23 for evaluation of multiple abx allergies, but has had neg PCN skin testing. She has tolerated amox-clav in the past, but most describes hives/intolerance with a recent regimen of this; it's not clear if this is due to the clavulanate component. For now, she seems to be tolerating amp-sulbactam and would continue this w/ plan for discharge on an IV regimen pending her cx results.      Pt is  s/p debridement of mandibular osteomyelitis and placement of recon plate (12/3).  12/3 OR Bone and Abscess Cx (1+) Rare Mixed Gram-Positive Bacteria. MRSA screen in progress  Pt is doing well, denies any acute issues. Swelling consistent with surgical timeline. Patient was seen by ID, who recommended PICC line for continued antibiotics     #Mandibular OM  #Multiple abx adverse effects vs allergy      Recommendations:  ID recommends IV unasyn 3G Q6H for 6 weeks course (12/3/23 - end date 01/14/2024) with plan to followup with [facility] ID team outpt for chronic oral antibiotics suppression as well as weekly labs of CBC/d, CRP, and RFP.     -Please ask social work to coordinate / [facility] social workers/care coordination with regards to home IV abx set up and weekly labs    -[facility] team (Dr Mendez) at [facility] made aware and emailed of patient and need for long term followup.     - From FS perspective, pt was cleared for discharge pending home setup for PICC line is adequate.    Thank you for consult, ID has signed off.      ID team A pager [phone].  For new consults, contact pager [phone].   Patient seen and staffed with ID attendant Dr Brianna Lopez  who agrees with plan as above  Letty IQBAL  MD Angelica MPH

## 2023-12-06 NOTE — PROGRESS NOTES
Discharge Planning Note:    Received a call from the [facility] T[facility] department they called and spoke with the patient. One of the requirements patient must have someone in her home for assistance, which she lives alone. The [facility] as declined services for homecare. The [facility] team  has decided the patient is not a candidate for VA homecare.... she has 2 options:  1) transfer to the [facility] for IV atb therapy  or  2) use private insurance apply for Medicaid or Medicare if she doesn't have.   The team here at  made aware. Going to inform patient in person.       Addendum:  Spoke with the patient she will transfer to the [facility] she has no other insurance for coverage. Called the [facility] back and made them aware, will begin to set up transfer.             Maria Elena De Oliveira RN TCC

## 2023-12-06 NOTE — PROGRESS NOTES
Discharge Planning Note:    The [facility] called back report they called and explained the situation to the patient as well. Next steps referral placed to the transfer center waiting on Jd to call from the [facility] transfer center today or tomorrow for assistance with the transfer.     Maria Elena De Oliveira RN TCC

## 2023-12-06 NOTE — PROGRESS NOTES
Discharge Planning Note:      Renetta Waldron is a 63 y.o. female on day 3 of admission presenting with Osteomyelitis of mandible.        All information faxed to the [facility] [phone] and script to pharmacy [phone]. Also emailed to [email or web address] pending response for discharge today.     -    Maria Elena De Oliveira RN TCC

## 2023-12-06 NOTE — PROGRESS NOTES
Occupational Therapy    Occupational Therapy    Evaluation    Patient Name: Renetta Waldron  MRN: [id number]  Today's Date: 12/6/2023  Time Calculation  Start Time: 1240  Stop Time: 1250  Time Calculation (min): 10 min    Assessment  IP OT Assessment  OT Assessment: NN eval only  Prognosis: Excellent  Barriers to Discharge: None  Evaluation/Treatment Tolerance: Patient tolerated treatment well  Medical Staff Made Aware: Yes  End of Session Communication: Bedside nurse  End of Session Patient Position: Bed, 3 rail up, Alarm off, not on at start of session  Plan:  No Skilled OT: At baseline function  OT Discharge Recommendations: No further acute OT, No OT needed after discharge  OT Recommended Transfer Status: Independent  OT - OK to Discharge: Yes    Subjective   Current Problem:  1. Osteomyelitis of mandible        2. Acute abscess of jaw  Fungal Culture/Smear    Fungal Culture/Smear    Tissue/Wound Culture/Smear    Tissue/Wound Culture/Smear    Surgical Pathology Exam    Surgical Pathology Exam    Fungal Culture/Smear    Fungal Culture/Smear    Tissue/Wound Culture/Smear    Tissue/Wound Culture/Smear        General:  Reason for Referral: s/p osteomyelitis debridement and placement of recon plate on anterior mandible.  Past Medical History Relevant to Rehab: COPD; infection since tooth surgeries  Prior to Session Communication: Bedside nurse  Patient Position Received: Bed, 3 rail up, Alarm off, not on at start of session  Family/Caregiver Present: No   Precautions:  Medical Precautions: Fall precautions  Vital Signs:     Pain:  Pain Assessment  Pain Assessment: 0-10  Pain Score: 3  Pain Location:  (R side jaw)  Lines/Tubes/Drains:  PICC - Adult 12/05/23 Single lumen Right Basilic vein (Active)   Number of days: 0         Objective   Cognition:  Overall Cognitive Status: Within Functional Limits  Orientation Level: Oriented X4  Safety/Judgement: Within Functional Limits           Home Living:  Type of Home:  House  Lives With: Spouse  Home Adaptive Equipment: None  Home Layout: Two level, Stairs to alternate level with rails  Home Access:  (2 SOLA flight to bed/bathroom with tub shower)   Prior Function:  Level of [place]: Independent with ADLs and functional transfers, Independent with homemaking with ambulation, Independent with homemaking with wheelchair  Vocational: Full time employment  Leisure: yardwork  Hand Dominance: Right  IADL History:  IADL Comments: I I/ADLs, +drives  ADL:  Eating Assistance: Independent  Grooming Assistance: Independent  Bathing Assistance: Independent  UE Dressing Assistance: Independent  LE Dressing Assistance: Independent  Toileting Assistance with Device: Independent  Activity Tolerance:  Endurance:  (good)  Balance:  Dynamic Sitting Balance  Dynamic Sitting-Balance:  (I)  Dynamic Standing Balance  Dynamic Standing-Balance:  (I)  Static Sitting Balance  Static Sitting-Level of Assistance: Independent  Static Standing Balance  Static Standing-Level of Assistance:  (I)  Bed Mobility/Transfers: Bed Mobility 1  Level of Assistance 1:  (sup/sit I)   and Transfers  Transfer:  (sit/stand I, pt performed fxnl mob to/from bed/hallway I)  IADL's:   IADL Comments: I I/ADLs, +drives  Vision: Vision - Basic Assessment  Current Vision: Wears glasses only for reading   and    Sensation:  Light Touch:  (n/t mouth)  Strength:  Strength Comments: BUE WFL       Coordination:  Movements are Fluid and Coordinated: Yes   Hand Function:  Hand Function  Gross Grasp: Functional  Extremities: RUE   RUE : Within Functional Limits, LUE   LUE: Within Functional Limits,  , and      Outcome Measures: [facility] Daily Activity  Putting on and taking off regular lower body clothing: None  Bathing (including washing, rinsing, drying): None  Putting on and taking off regular upper body clothing: None  Toileting, which includes using toilet, bedpan or urinal: None  Taking care of personal grooming such as brushing teeth:  None  Eating Meals: None  Daily Activity - Total Score: 24         ,     OT Adult Other Outcome Measures  4AT: -    Education Documentation  Precautions, taught by Madison Del Rosario OT at 12/6/2023 12:59 PM.  Learner: Patient  Readiness: Acceptance  Method: Explanation  Response: Verbalizes Understanding    ADL Training, taught by Madison Del Rosario OT at 12/6/2023 12:59 PM.  Learner: Patient  Readiness: Acceptance  Method: Explanation  Response: Verbalizes Understanding    Education Comments  No comments found.            12/06/23 at 1:00 PM   Madison Del Rosario OT

## 2023-12-06 NOTE — DOCUMENTATION CLARIFICATION NOTE
"    PATIENT:               RACHEL WOLFF  ACCT #:                  [id number]  MRN:                       [id number]  :                       1960  ADMIT DATE:       12/3/2023 7:47 AM  DISCH DATE:  RESPONDING PROVIDER #:        [id number]          PROVIDER RESPONSE TEXT:    Excisional debridement down to and including mandible    CDI QUERY TEXT:    [facility]_Debridement        Instruction:    Based on your assessment of the patient and the clinical information, please provide the requested documentation by clicking on the appropriate radio button and enter any additional information if prompted.    Question: Please further clarify the debridement procedure as    When answering this query, please exercise your independent professional judgment. The fact that a question is being asked, does not imply that any particular answer is desired or expected.    The patient's clinical indicators include:  Clinical Information: Pt is a 63 yr old female hx mild COPD who was transferred from [facility] with osteomyelitis of the mandible.  VS on admit - 36.4 - 86 - 16  129/75 pox RA - 95%    This query refers to the procedure performed on 12/3/23ER and documented in op report as \"Debridement Anterior Mandible Face\".  Additional documentation in op report shows: \"A pineapple mahi with copious normal saline and rongeurs used to remove all infected bone and all sharp edges were removed. Bone was removed until bleeding bone was encountered.\"  Options provided:  -- Excisional debridement down to and including mandible  -- Other - I will add my own diagnosis  -- Refer to Clinical Documentation Reviewer    Query created by: Rosalia Paniagua on 2023 1:26 PM      Electronically signed by:  GERSNO ARCOS DDS 2023 3:33 PM          "

## 2023-12-07 ENCOUNTER — PHARMACY VISIT (OUTPATIENT)
Dept: PHARMACY | Facility: CLINIC | Age: 63
End: 2023-12-07

## 2023-12-07 VITALS
RESPIRATION RATE: 18 BRPM | OXYGEN SATURATION: 93 % | HEART RATE: 65 BPM | TEMPERATURE: 98.1 F | SYSTOLIC BLOOD PRESSURE: 146 MMHG | WEIGHT: 150 LBS | HEIGHT: 66 IN | BODY MASS INDEX: 24.11 KG/M2 | DIASTOLIC BLOOD PRESSURE: 75 MMHG

## 2023-12-07 LAB
SARS-COV-2 RNA RESP QL NAA+PROBE: NOT DETECTED
STAPHYLOCOCCUS SPEC CULT: NORMAL

## 2023-12-07 PROCEDURE — 2500000004 HC RX 250 GENERAL PHARMACY W/ HCPCS (ALT 636 FOR OP/ED): Performed by: STUDENT IN AN ORGANIZED HEALTH CARE EDUCATION/TRAINING PROGRAM

## 2023-12-07 PROCEDURE — 96372 THER/PROPH/DIAG INJ SC/IM: CPT | Performed by: STUDENT IN AN ORGANIZED HEALTH CARE EDUCATION/TRAINING PROGRAM

## 2023-12-07 PROCEDURE — RXMED WILLOW AMBULATORY MEDICATION CHARGE

## 2023-12-07 PROCEDURE — 87635 SARS-COV-2 COVID-19 AMP PRB: CPT

## 2023-12-07 PROCEDURE — 2500000001 HC RX 250 WO HCPCS SELF ADMINISTERED DRUGS (ALT 637 FOR MEDICARE OP): Performed by: STUDENT IN AN ORGANIZED HEALTH CARE EDUCATION/TRAINING PROGRAM

## 2023-12-07 RX ADMIN — ACETAMINOPHEN 650 MG: 650 SOLUTION ORAL at 12:57

## 2023-12-07 RX ADMIN — ENOXAPARIN SODIUM 30 MG: 30 INJECTION SUBCUTANEOUS at 08:41

## 2023-12-07 RX ADMIN — AMPICILLIN SODIUM AND SULBACTAM SODIUM 3 G: 2; 1 INJECTION, POWDER, FOR SOLUTION INTRAMUSCULAR; INTRAVENOUS at 15:29

## 2023-12-07 RX ADMIN — AMPICILLIN SODIUM AND SULBACTAM SODIUM 3 G: 2; 1 INJECTION, POWDER, FOR SOLUTION INTRAMUSCULAR; INTRAVENOUS at 08:41

## 2023-12-07 RX ADMIN — KETOROLAC TROMETHAMINE 30 MG: 30 INJECTION, SOLUTION INTRAMUSCULAR; INTRAVENOUS at 08:43

## 2023-12-07 RX ADMIN — CHLORHEXIDINE GLUCONATE 0.12% ORAL RINSE 15 ML: 1.2 LIQUID ORAL at 08:42

## 2023-12-07 RX ADMIN — AMPICILLIN SODIUM AND SULBACTAM SODIUM 3 G: 2; 1 INJECTION, POWDER, FOR SOLUTION INTRAMUSCULAR; INTRAVENOUS at 02:41

## 2023-12-07 ASSESSMENT — COGNITIVE AND FUNCTIONAL STATUS - GENERAL
MOBILITY SCORE: 24
DAILY ACTIVITIY SCORE: 24
DAILY ACTIVITIY SCORE: 24
MOBILITY SCORE: 24
DAILY ACTIVITIY SCORE: 24
MOBILITY SCORE: 24
MOBILITY SCORE: 24
DAILY ACTIVITIY SCORE: 24

## 2023-12-07 ASSESSMENT — PAIN - FUNCTIONAL ASSESSMENT
PAIN_FUNCTIONAL_ASSESSMENT: 0-10
PAIN_FUNCTIONAL_ASSESSMENT: 0-10

## 2023-12-07 ASSESSMENT — PAIN DESCRIPTION - LOCATION
LOCATION: JAW
LOCATION: JAW

## 2023-12-07 ASSESSMENT — PAIN SCALES - WONG BAKER
WONGBAKER_NUMERICALRESPONSE: HURTS LITTLE MORE
WONGBAKER_NUMERICALRESPONSE: HURTS EVEN MORE
WONGBAKER_NUMERICALRESPONSE: HURTS LITTLE MORE

## 2023-12-07 ASSESSMENT — PAIN SCALES - PAIN ASSESSMENT IN ADVANCED DEMENTIA (PAINAD)
BODYLANGUAGE: RELAXED
CONSOLABILITY: NO NEED TO CONSOLE
BREATHING: NORMAL

## 2023-12-07 ASSESSMENT — PAIN SCALES - GENERAL
PAINLEVEL_OUTOF10: 6
PAINLEVEL_OUTOF10: 10 - WORST POSSIBLE PAIN
PAINLEVEL_OUTOF10: 6

## 2023-12-07 NOTE — PROGRESS NOTES
"  Renetta Waldron is a 63 y.o. female on day 4 of admission presenting with Osteomyelitis of mandible.        Subjective   Pt is a 64 yo f 4 days s/p osteomyelitis debridement and placement of recon plate on anterior mandible. Pt is doing well post-operatively, no complaints of acute pain, nausea, vomiting. Pt feels ready for discharge, but would like to know if there are any other options besides [facility] as they declined her home setup request.              Objective   Physical Exam  Constitutional:       Appearance: Normal appearance.   HENT:      Head:      Comments: Intraoral incisions intact, no dehiscence  CNV3 slight hypoesthesia in L region  CNV3 intact to crude pressure  Swelling minimal  FOM soft, but swollen consistent with surgical timeline  Eyes:      Extraocular Movements: Extraocular movements intact.   Cardiovascular:      Rate and Rhythm: Normal rate.   Pulmonary:      Effort: Pulmonary effort is normal.   Musculoskeletal:         General: Normal range of motion.      Cervical back: Normal range of motion.   Skin:     General: Skin is warm. Submandibular edema consistent with post-op course.  Neurological:      General: No focal deficit present.      Mental Status: She is alert.   Psychiatric:         Mood and Affect: Mood normal.         Behavior: Behavior normal.         Last Recorded Vitals  Blood pressure 137/72, pulse 73, temperature 36.5 °C (97.7 °F), resp. rate 18, height 1.676 m (5' 6\"), weight 68 kg (150 lb), SpO2 93 %.  Intake/Output last 3 Shifts:  I/O last 3 completed shifts:  In: 2920 (42.9 mL/kg) [P.O.:1420; I.V.:900 (13.2 mL/kg); IV Piggyback:600]  Out: 0 (0 mL/kg)   Weight: 68 kg               Assessment/Plan   Active Problems:    Osteomyelitis (CMS/HCC)    Chronic obstructive pulmonary disease (CMS/HCC)     Pt has had uneventful post-operative course s/p debridement of anterior mandible w/ recon plate placement this past Ahsan 12/3. ID was consulted who " "recommended PICC placement and IV unasyn 3G Q6H for 6 weeks course (12/3/23 - end date 01/14/2024) and to plan to followup with VA ID team outptatient.      Pt was evaluated by PT/OT in . Must also receive PICC education prior to discharge. Pt's care will be transferred to the [facility] infectious disease team. Her transfer of care has been co-managed with several teams including ID, social work, PT/OT, and the nursing care coordinator to ensure she is setup at the VA. Yesterday, 12/6. VA informed pt that they are not comfortable with her having a home setup as she lives alone and are requesting inpatient stay at [facility] for [facility] weeks. Pt understands this and will be transferred to the [facility] today, awaiting response from \"Jd\" at VA who is working with care coordinator here at  to arrange the transfer.     OMFS discharge recs:  -Continue IV abx per ID via PICC  -Full liquid diet  -Chlorhexidine 0.12% TID, swish and spit  -Sleep w/ HOB elevated 30 deg  -Analgesia managed w/ OTC, pt denies stronger pain medication and has tolerated pain well  -1 week follow up in outpatient clinic at [address] ([phone])     Please reach out to FS with any questions @ #[phone]        John Feliciano DDS        "

## 2023-12-07 NOTE — PROGRESS NOTES
Discharge Planning Note:    Call placed to Jd at the [facility] transfer center for  next steps with the transfer . No answer message left for follow up.         Maria Elena De Oliveira, RNTCC

## 2023-12-07 NOTE — PROGRESS NOTES
Discharge Planning Note;      Spoke with Jd from the [facility] transfer center. Pending covid results called into him [phone] he is setting up transport for today. Nurse report number [phone] to [phone]. Jd will make us aware of arrangements once complete and results are received. MD, , and patient made aware.             Maria Elena De Oliveira, RNTCC

## 2023-12-07 NOTE — PROGRESS NOTES
Discharge Planning Note:      Renetta Waldron is a 63 y.o. female on day 4 of admission presenting with Osteomyelitis of mandible.    [facility] patient needs a covid 24hrs prior to admission. Team made aware and ordered for today. Jd will call this TCC back once confirmed.                      Maria Elena De Oliveira RN TCC

## 2023-12-07 NOTE — CARE PLAN
The patient's goals for the shift include      The clinical goals for the shift include pt will have decrease in pain this shift.

## 2023-12-11 LAB
BACTERIA SPEC CULT: NORMAL
BACTERIA SPEC CULT: NORMAL
GRAM STN SPEC: NORMAL

## 2023-12-18 LAB
FUNGUS SPEC CULT: NORMAL
FUNGUS SPEC FUNGUS STN: NORMAL

## 2023-12-19 LAB
LABORATORY COMMENT REPORT: NORMAL
PATH REPORT.FINAL DX SPEC: NORMAL
PATH REPORT.GROSS SPEC: NORMAL
PATH REPORT.TOTAL CANCER: NORMAL

## 2023-12-24 ENCOUNTER — APPOINTMENT (OUTPATIENT)
Dept: CARDIOLOGY | Facility: HOSPITAL | Age: 63
End: 2023-12-24
Payer: OTHER GOVERNMENT

## 2023-12-24 ENCOUNTER — APPOINTMENT (OUTPATIENT)
Dept: RADIOLOGY | Facility: HOSPITAL | Age: 63
End: 2023-12-24
Payer: OTHER GOVERNMENT

## 2023-12-24 ENCOUNTER — HOSPITAL ENCOUNTER (EMERGENCY)
Facility: HOSPITAL | Age: 63
Discharge: HOME | End: 2023-12-24
Payer: OTHER GOVERNMENT

## 2023-12-24 ENCOUNTER — HOSPITAL ENCOUNTER (EMERGENCY)
Facility: HOSPITAL | Age: 63
Discharge: HOME | End: 2023-12-25
Attending: EMERGENCY MEDICINE
Payer: OTHER GOVERNMENT

## 2023-12-24 VITALS
TEMPERATURE: 97.5 F | OXYGEN SATURATION: 95 % | SYSTOLIC BLOOD PRESSURE: 125 MMHG | HEART RATE: 80 BPM | WEIGHT: 145.5 LBS | BODY MASS INDEX: 23.38 KG/M2 | DIASTOLIC BLOOD PRESSURE: 73 MMHG | RESPIRATION RATE: 16 BRPM | HEIGHT: 66 IN

## 2023-12-24 DIAGNOSIS — T81.30XA WOUND DEHISCENCE: Primary | ICD-10-CM

## 2023-12-24 LAB
ALBUMIN SERPL BCP-MCNC: 4 G/DL (ref 3.4–5)
ALP SERPL-CCNC: 105 U/L (ref 33–136)
ALT SERPL W P-5'-P-CCNC: 6 U/L (ref 7–45)
ANION GAP SERPL CALC-SCNC: 14 MMOL/L (ref 10–20)
AST SERPL W P-5'-P-CCNC: 11 U/L (ref 9–39)
BASOPHILS # BLD AUTO: 0.04 X10*3/UL (ref 0–0.1)
BASOPHILS NFR BLD AUTO: 0.6 %
BILIRUB SERPL-MCNC: 0.6 MG/DL (ref 0–1.2)
BUN SERPL-MCNC: 6 MG/DL (ref 6–23)
C DIF TOX TCDA+TCDB STL QL NAA+PROBE: NOT DETECTED
CALCIUM SERPL-MCNC: 9.2 MG/DL (ref 8.6–10.3)
CARDIAC TROPONIN I PNL SERPL HS: <3 NG/L (ref 0–13)
CARDIAC TROPONIN I PNL SERPL HS: <3 NG/L (ref 0–13)
CHLORIDE SERPL-SCNC: 102 MMOL/L (ref 98–107)
CO2 SERPL-SCNC: 28 MMOL/L (ref 21–32)
CREAT SERPL-MCNC: 0.69 MG/DL (ref 0.5–1.05)
D DIMER PPP FEU-MCNC: 595 NG/ML FEU
EOSINOPHIL # BLD AUTO: 0.13 X10*3/UL (ref 0–0.7)
EOSINOPHIL NFR BLD AUTO: 1.8 %
ERYTHROCYTE [DISTWIDTH] IN BLOOD BY AUTOMATED COUNT: 13.2 % (ref 11.5–14.5)
GFR SERPL CREATININE-BSD FRML MDRD: >90 ML/MIN/1.73M*2
GLUCOSE SERPL-MCNC: 83 MG/DL (ref 74–99)
HCT VFR BLD AUTO: 42.9 % (ref 36–46)
HGB BLD-MCNC: 13.8 G/DL (ref 12–16)
IMM GRANULOCYTES # BLD AUTO: 0.01 X10*3/UL (ref 0–0.7)
IMM GRANULOCYTES NFR BLD AUTO: 0.1 % (ref 0–0.9)
LACTATE SERPL-SCNC: 0.7 MMOL/L (ref 0.4–2)
LYMPHOCYTES # BLD AUTO: 2.26 X10*3/UL (ref 1.2–4.8)
LYMPHOCYTES NFR BLD AUTO: 31.3 %
MAGNESIUM SERPL-MCNC: 2.04 MG/DL (ref 1.6–2.4)
MCH RBC QN AUTO: 27.5 PG (ref 26–34)
MCHC RBC AUTO-ENTMCNC: 32.2 G/DL (ref 32–36)
MCV RBC AUTO: 86 FL (ref 80–100)
MONOCYTES # BLD AUTO: 0.42 X10*3/UL (ref 0.1–1)
MONOCYTES NFR BLD AUTO: 5.8 %
NEUTROPHILS # BLD AUTO: 4.35 X10*3/UL (ref 1.2–7.7)
NEUTROPHILS NFR BLD AUTO: 60.4 %
NRBC BLD-RTO: 0 /100 WBCS (ref 0–0)
PLATELET # BLD AUTO: 296 X10*3/UL (ref 150–450)
POTASSIUM SERPL-SCNC: 4.2 MMOL/L (ref 3.5–5.3)
PROT SERPL-MCNC: 6.7 G/DL (ref 6.4–8.2)
RBC # BLD AUTO: 5.02 X10*6/UL (ref 4–5.2)
SODIUM SERPL-SCNC: 140 MMOL/L (ref 136–145)
WBC # BLD AUTO: 7.2 X10*3/UL (ref 4.4–11.3)

## 2023-12-24 PROCEDURE — 83735 ASSAY OF MAGNESIUM: CPT | Performed by: REGISTERED NURSE

## 2023-12-24 PROCEDURE — 99284 EMERGENCY DEPT VISIT MOD MDM: CPT | Performed by: EMERGENCY MEDICINE

## 2023-12-24 PROCEDURE — 87506 IADNA-DNA/RNA PROBE TQ 6-11: CPT | Mod: ELYLAB | Performed by: REGISTERED NURSE

## 2023-12-24 PROCEDURE — 83605 ASSAY OF LACTIC ACID: CPT | Performed by: REGISTERED NURSE

## 2023-12-24 PROCEDURE — 99285 EMERGENCY DEPT VISIT HI MDM: CPT

## 2023-12-24 PROCEDURE — 70491 CT SOFT TISSUE NECK W/DYE: CPT

## 2023-12-24 PROCEDURE — 85025 COMPLETE CBC W/AUTO DIFF WBC: CPT | Performed by: REGISTERED NURSE

## 2023-12-24 PROCEDURE — 93005 ELECTROCARDIOGRAM TRACING: CPT

## 2023-12-24 PROCEDURE — 80053 COMPREHEN METABOLIC PANEL: CPT | Performed by: REGISTERED NURSE

## 2023-12-24 PROCEDURE — 87493 C DIFF AMPLIFIED PROBE: CPT | Performed by: REGISTERED NURSE

## 2023-12-24 PROCEDURE — 71275 CT ANGIOGRAPHY CHEST: CPT | Mod: FOREIGN READ | Performed by: RADIOLOGY

## 2023-12-24 PROCEDURE — 85379 FIBRIN DEGRADATION QUANT: CPT | Performed by: REGISTERED NURSE

## 2023-12-24 PROCEDURE — 71275 CT ANGIOGRAPHY CHEST: CPT

## 2023-12-24 PROCEDURE — 70491 CT SOFT TISSUE NECK W/DYE: CPT | Performed by: RADIOLOGY

## 2023-12-24 PROCEDURE — 99285 EMERGENCY DEPT VISIT HI MDM: CPT | Performed by: EMERGENCY MEDICINE

## 2023-12-24 PROCEDURE — 84484 ASSAY OF TROPONIN QUANT: CPT | Performed by: REGISTERED NURSE

## 2023-12-24 PROCEDURE — 2550000001 HC RX 255 CONTRASTS: Performed by: REGISTERED NURSE

## 2023-12-24 PROCEDURE — 99285 EMERGENCY DEPT VISIT HI MDM: CPT | Mod: 25 | Performed by: REGISTERED NURSE

## 2023-12-24 RX ORDER — KETOROLAC TROMETHAMINE 15 MG/ML
15 INJECTION, SOLUTION INTRAMUSCULAR; INTRAVENOUS ONCE
Status: DISCONTINUED | OUTPATIENT
Start: 2023-12-25 | End: 2023-12-25 | Stop reason: HOSPADM

## 2023-12-24 RX ADMIN — IOHEXOL 75 ML: 350 INJECTION, SOLUTION INTRAVENOUS at 16:30

## 2023-12-24 ASSESSMENT — LIFESTYLE VARIABLES
HAVE YOU EVER FELT YOU SHOULD CUT DOWN ON YOUR DRINKING: NO
HAVE PEOPLE ANNOYED YOU BY CRITICIZING YOUR DRINKING: NO
REASON UNABLE TO ASSESS: NO
EVER HAD A DRINK FIRST THING IN THE MORNING TO STEADY YOUR NERVES TO GET RID OF A HANGOVER: NO
HAVE YOU EVER FELT YOU SHOULD CUT DOWN ON YOUR DRINKING: NO
HAVE PEOPLE ANNOYED YOU BY CRITICIZING YOUR DRINKING: NO
REASON UNABLE TO ASSESS: NO
EVER FELT BAD OR GUILTY ABOUT YOUR DRINKING: NO
EVER HAD A DRINK FIRST THING IN THE MORNING TO STEADY YOUR NERVES TO GET RID OF A HANGOVER: NO
EVER FELT BAD OR GUILTY ABOUT YOUR DRINKING: NO

## 2023-12-24 ASSESSMENT — COLUMBIA-SUICIDE SEVERITY RATING SCALE - C-SSRS
6. HAVE YOU EVER DONE ANYTHING, STARTED TO DO ANYTHING, OR PREPARED TO DO ANYTHING TO END YOUR LIFE?: NO
1. IN THE PAST MONTH, HAVE YOU WISHED YOU WERE DEAD OR WISHED YOU COULD GO TO SLEEP AND NOT WAKE UP?: NO
2. HAVE YOU ACTUALLY HAD ANY THOUGHTS OF KILLING YOURSELF?: NO
6. HAVE YOU EVER DONE ANYTHING, STARTED TO DO ANYTHING, OR PREPARED TO DO ANYTHING TO END YOUR LIFE?: NO
1. IN THE PAST MONTH, HAVE YOU WISHED YOU WERE DEAD OR WISHED YOU COULD GO TO SLEEP AND NOT WAKE UP?: NO
2. HAVE YOU ACTUALLY HAD ANY THOUGHTS OF KILLING YOURSELF?: NO

## 2023-12-24 ASSESSMENT — PAIN - FUNCTIONAL ASSESSMENT: PAIN_FUNCTIONAL_ASSESSMENT: 0-10

## 2023-12-24 ASSESSMENT — PAIN SCALES - GENERAL
PAINLEVEL_OUTOF10: 8
PAINLEVEL_OUTOF10: 5 - MODERATE PAIN

## 2023-12-24 NOTE — ED PROVIDER NOTES
"HPI   Chief Complaint   Patient presents with    Jaw Pain     \"I had jaw surgery and I think the bone is coming out.\"    Diarrhea     \"I also think I have diarrhea and I think It from the antibiotic.\"       HPI  63-year-old female with past medical history significant for COPD and tobacco use presents emergency department today for evaluation of jaw pain and diarrhea.  Patient was recently admitted at [facility] for osteomyelitis of the mandible.  Patient underwent debridement of the left anterior mandible on 12/3/23. patient was discharged home on IV antibiotics.  Per chart review it does look like patient was discharged on IV Unasyn.  She tells me that she was discharged home and has been continuing to get her IV antibiotics.  Patient tells me that her told her home health care nurse that she felt like there was a stitch coming undone.  However when I looked at it patient tells me that there is bone exposed in her left lower jaw.  Patient also tells me that she has been experiencing chest tightness and diarrhea.  Patient rates the chest tightness as a 6/10 on the pain scale.  Patient tells me that sometimes it feels like it is difficult for her to breathe.  Patient tells me that she did reach out to the oral surgery team who told her to come to the emergency department if her pain persisted.  Patient denies any fever or chills, denies any nausea vomiting, urinary symptoms, or weakness.                  [place] Coma Scale Score: 15                  Patient History   Past Medical History:   Diagnosis Date    COPD (chronic obstructive pulmonary disease) (CMS/[facility])      Past Surgical History:   Procedure Laterality Date    COLONOSCOPY       No family history on file.  Social History     Tobacco Use    Smoking status: Every Day     Packs/day: .5     Types: Cigarettes    Smokeless tobacco: Never   Vaping Use    Vaping Use: Never used   Substance Use Topics    Alcohol use: Yes    Drug use: Never       Physical Exam   ED Triage " Vitals [12/24/23 1342]   Temp Heart Rate Resp BP   36.4 °C (97.5 °F) 89 16 123/58      SpO2 Temp Source Heart Rate Source Patient Position   96 % Tympanic Monitor Sitting      BP Location FiO2 (%)     Right arm --       Physical Exam  Vitals and nursing note reviewed.   Constitutional:       Appearance: Normal appearance.   HENT:      Head: Normocephalic and atraumatic.      Mouth/Throat:      Mouth: Mucous membranes are moist.      Dentition: Abnormal dentition.      Comments: Patient without teeth from mid left tooth to the back.  There were white ridges noted in left lower jawline which could represent jawbone.  Cardiovascular:      Rate and Rhythm: Normal rate and regular rhythm.   Pulmonary:      Effort: Pulmonary effort is normal.   Abdominal:      Palpations: Abdomen is soft.   Skin:     General: Skin is warm and dry.      Capillary Refill: Capillary refill takes less than 2 seconds.   Neurological:      General: No focal deficit present.      Mental Status: She is alert.   Psychiatric:         Mood and Affect: Mood normal.       ED Course & MDM   Diagnoses as of 12/24/23 1844   Wound dehiscence       Medical Decision Making  63-year-old female with past medical history significant for COPD and tobacco use presents emergency department today for evaluation of jaw pain and diarrhea.  Patient was recently admitted at [facility] for osteomyelitis of the mandible.  Patient underwent debridement of the left anterior mandible on 12/3/23. patient was discharged home on IV erythromycin.  She tells me that she was discharged home and has been continuing to get her IV antibiotics.  Patient tells me that her told her home health care nurse that she felt like there was a stitch coming undone.  However when I looked at it patient tells me that there were multiple white sutures as well as a small area of opening towards the front of the jaw.  Patient also tells me that she has been experiencing chest tightness and diarrhea.   Patient rates the chest tightness as a 6/10 on the pain scale.  Patient tells me that sometimes it feels like it is difficult for her to breathe.  Patient tells me that she did reach out to the oral surgery team who told her to come to the emergency department if her pain persisted.  Patient denies any fever or chills, denies any nausea vomiting, urinary symptoms, or weakness.    Patient seen examined emergency department; patient is healthy and nontoxic in appearance does not appear to be in acute distress.  Clinical exam significant for white ridges that appear to be bone in the left lower jaw.  Patient tells me that this is not how she looks postoperatively.  Patient tells me that she has continued to smoke tobacco throughout the healing process.  We did discuss how this does not promote healing and could result in less than desired outcomes.  Patient's heart regular rate and rhythm without any murmur noted.  Lung sounds are clear bilaterally without any adventitious noise.  Abdomen is soft without any rigidity.  Will order CT with contrast of the soft tissue to evaluate for abscess.  Also obtain chest x-ray, EKG and troponins to rule out acute ACS.  Given patient's long course of antibiotics will order stool for C. difficile.  Patient agreed with medical plan assessment.  Patient's pain treated with IV morphine and IV and Zofran.    EKG at 15:06 with ventricular rate of 85, as interpreted by me, shows a normal rate and rhythm possible left atrial enlargement, normal axis, normal intervals. And normal ST and T wave pattern with no evidence of acute ischemia or other acute findings    Patient's D-dimer came back elevated at 595 therefore we will also order a CT angio of the chest to rule out PE    Repeat EKG at 16:14 with ventricular rate of 78, as interpreted by me, shows a normal rate and rhythm with possible left atrial enlargement, normal axis, normal intervals. And normal ST and T wave pattern with no  evidence of acute ischemia or other acute findings    CT angio of the chest is negative for PE however CT angio of soft tissue of the neck does show a possible fluid collection within the lytic bone measuring 1.2 cm which could represent urinoma versus abscess there is a 9 mm segments clinical in linear mandible cortical dehiscence  Patient CMP and CBC are unremarkable, 6 2 was negative for C. difficile, magnesium was 2.04, lactate 0.7.  High sensitive troponins were negative x 2.    Call placed to maxillofacial surgery on-call at [facility].  I received a call back from Dr. Rojas with [facility] at [phone] who recommends for patient to be discharged home and continue IV antibiotics and for her to show up at the clinic on Tuesday.    A long discussion patient tells me that she is comfortable being discharged home.  Patient given Dr. [place]'s recommendations to follow-up with the clinic on Tuesday.  I did relay to her that she could self present or she can call for an appointment.  All patient's questions and concerns were addressed prior to discharge.  Patient discharged home in stable condition.      Labs Reviewed   COMPREHENSIVE METABOLIC PANEL - Abnormal       Result Value    Glucose 83      Sodium 140      Potassium 4.2      Chloride 102      Bicarbonate 28      Anion Gap 14      Urea Nitrogen 6      Creatinine 0.69      eGFR >90      Calcium 9.2      Albumin 4.0      Alkaline Phosphatase 105      Total Protein 6.7      AST 11      Bilirubin, Total 0.6      ALT 6 (*)    D-DIMER, NON VTE - Abnormal    D-Dimer Non VTE, Quant (ng/mL FEU) 595 (*)     Narrative:     The D-Dimer assay is reported in ng/mL Fibrinogen Equivalent Units (FEU). The results of this assay should NOT be used for the exclusion of Deep Vein Thrombosis and/or Pulmonary Embolism.   C. DIFFICILE, PCR - Normal    C. difficile, PCR Not Detected      Narrative:     This test is an FDA-cleared real-time PCR assay for detection of toxigenic C. difficile  DNA from unprocessed liquid or unformed stool specimens that have not undergone nucleic acid extraction in symptomatic patients with potential C. difficile infection (CDI). A positive result may indicate colonization, and clinical assessment is required for the diagnosis of CDI. This test cannot be performed on formed stools or used as a test of cure, and should not be performed more than once per 7 days.   MAGNESIUM - Normal    Magnesium 2.04     LACTATE - Normal    Lactate 0.7      Narrative:     Venipuncture immediately after or during the administration of Metamizole may lead to falsely low results. Testing should be performed immediately  prior to Metamizole dosing.   SERIAL TROPONIN-INITIAL - Normal    Troponin I, High Sensitivity <3      Narrative:     Less than 99th percentile of normal range cutoff-  Female and children under 18 years old <14 ng/L; Male <21 ng/L: Negative  Repeat testing should be performed if clinically indicated.     Female and children under 18 years old 14-50 ng/L; Male 21-50 ng/L:  Consistent with possible cardiac damage and possible increased clinical   risk. Serial measurements may help to assess extent of myocardial damage.     >50 ng/L: Consistent with cardiac damage, increased clinical risk and  myocardial infarction. Serial measurements may help assess extent of   myocardial damage.      NOTE: Children less than 1 year old may have higher baseline troponin   levels and results should be interpreted in conjunction with the overall   clinical context.     NOTE: Troponin I testing is performed using a different   testing methodology at [facility] than at other   [facility]. Direct result comparisons should only   be made within the same method.   SERIAL TROPONIN, 1 HOUR - Normal    Troponin I, High Sensitivity <3      Narrative:     Less than 99th percentile of normal range cutoff-  Female and children under 18 years old <14 ng/L; Male <21 ng/L: Negative  Repeat  testing should be performed if clinically indicated.     Female and children under 18 years old 14-50 ng/L; Male 21-50 ng/L:  Consistent with possible cardiac damage and possible increased clinical   risk. Serial measurements may help to assess extent of myocardial damage.     >50 ng/L: Consistent with cardiac damage, increased clinical risk and  myocardial infarction. Serial measurements may help assess extent of   myocardial damage.      NOTE: Children less than 1 year old may have higher baseline troponin   levels and results should be interpreted in conjunction with the overall   clinical context.     NOTE: Troponin I testing is performed using a different   testing methodology at [facility] than at other   [facility]. Direct result comparisons should only   be made within the same method.   STOOL PATHOGEN PANEL, PCR   TROPONIN SERIES- (INITIAL, 1 HR)    Narrative:     The following orders were created for panel order Troponin I Series, High Sensitivity (0, 1 HR).  Procedure                               Abnormality         Status                     ---------                               -----------         ------                     Troponin I, High Sensiti...[[id number]]  Normal              Final result               Troponin, High Sensitivi...[[id number]]  Normal              Final result                 Please view results for these tests on the individual orders.   CBC WITH AUTO DIFFERENTIAL    WBC 7.2      nRBC 0.0      RBC 5.02      Hemoglobin 13.8      Hematocrit 42.9      MCV 86      MCH 27.5      MCHC 32.2      RDW 13.2      Platelets 296      Neutrophils % 60.4      Immature Granulocytes %, Automated 0.1      Lymphocytes % 31.3      Monocytes % 5.8      Eosinophils % 1.8      Basophils % 0.6      Neutrophils Absolute 4.35      Immature Granulocytes Absolute, Automated 0.01      Lymphocytes Absolute 2.26      Monocytes Absolute 0.42      Eosinophils Absolute 0.13      Basophils  Absolute 0.04         CT soft tissue neck w IV contrast   Final Result   Status post debridement and plate and screw fixation of the mandible   for history of osteomyelitis. Increased lytic area within the   anterior and right-greater-than-left parasymphyseal mandible as   detailed, likely predominantly representing postsurgical change   though superimposed progressive infectious changes can not be   excluded. Possible fluid collection within the lytic bone measuring   up to 1.2 cm, which may represent seroma versus abscess.        9 mm segments of buccal and lingual mandibular cortical dehiscence as   detailed.        Expected appearance of plate and screw hardware.        Signed by: Kia Salguero 12/24/2023 5:08 PM   Dictation workstation:   [id number]      CT angio chest for pulmonary embolism   Final Result   No evidence of pulmonary embolism.   Unremarkable CTA chest.   Signed by Jam Menendez MD            Procedure  Procedures     Maryam Hensley, APRN-CNP  12/24/23 4616

## 2023-12-25 VITALS
OXYGEN SATURATION: 97 % | RESPIRATION RATE: 16 BRPM | HEART RATE: 87 BPM | SYSTOLIC BLOOD PRESSURE: 113 MMHG | TEMPERATURE: 98.1 F | DIASTOLIC BLOOD PRESSURE: 80 MMHG | HEIGHT: 66 IN | BODY MASS INDEX: 22.98 KG/M2 | WEIGHT: 143 LBS

## 2023-12-25 PROBLEM — T81.30XA WOUND DEHISCENCE: Status: ACTIVE | Noted: 2023-12-25

## 2023-12-25 LAB
ABO GROUP (TYPE) IN BLOOD: NORMAL
ALBUMIN SERPL BCP-MCNC: 4 G/DL (ref 3.4–5)
ALP SERPL-CCNC: 102 U/L (ref 33–136)
ALT SERPL W P-5'-P-CCNC: 8 U/L (ref 7–45)
ANION GAP SERPL CALC-SCNC: 13 MMOL/L (ref 10–20)
ANTIBODY SCREEN: NORMAL
AST SERPL W P-5'-P-CCNC: 10 U/L (ref 9–39)
ATRIAL RATE: 78 BPM
ATRIAL RATE: 85 BPM
BASOPHILS # BLD AUTO: 0.04 X10*3/UL (ref 0–0.1)
BASOPHILS NFR BLD AUTO: 0.5 %
BILIRUB SERPL-MCNC: 0.5 MG/DL (ref 0–1.2)
BUN SERPL-MCNC: 11 MG/DL (ref 6–23)
C COLI+JEJ+UPSA DNA STL QL NAA+PROBE: NOT DETECTED
CALCIUM SERPL-MCNC: 10 MG/DL (ref 8.6–10.6)
CHLORIDE SERPL-SCNC: 104 MMOL/L (ref 98–107)
CO2 SERPL-SCNC: 28 MMOL/L (ref 21–32)
CREAT SERPL-MCNC: 0.76 MG/DL (ref 0.5–1.05)
EC STX1 GENE STL QL NAA+PROBE: NOT DETECTED
EC STX2 GENE STL QL NAA+PROBE: NOT DETECTED
EOSINOPHIL # BLD AUTO: 0.23 X10*3/UL (ref 0–0.7)
EOSINOPHIL NFR BLD AUTO: 2.8 %
ERYTHROCYTE [DISTWIDTH] IN BLOOD BY AUTOMATED COUNT: 13.1 % (ref 11.5–14.5)
GFR SERPL CREATININE-BSD FRML MDRD: 88 ML/MIN/1.73M*2
GLUCOSE SERPL-MCNC: 93 MG/DL (ref 74–99)
HCT VFR BLD AUTO: 42.1 % (ref 36–46)
HGB BLD-MCNC: 13.9 G/DL (ref 12–16)
IMM GRANULOCYTES # BLD AUTO: 0.01 X10*3/UL (ref 0–0.7)
IMM GRANULOCYTES NFR BLD AUTO: 0.1 % (ref 0–0.9)
LYMPHOCYTES # BLD AUTO: 2.5 X10*3/UL (ref 1.2–4.8)
LYMPHOCYTES NFR BLD AUTO: 30.3 %
MCH RBC QN AUTO: 27.3 PG (ref 26–34)
MCHC RBC AUTO-ENTMCNC: 33 G/DL (ref 32–36)
MCV RBC AUTO: 83 FL (ref 80–100)
MONOCYTES # BLD AUTO: 0.57 X10*3/UL (ref 0.1–1)
MONOCYTES NFR BLD AUTO: 6.9 %
NEUTROPHILS # BLD AUTO: 4.91 X10*3/UL (ref 1.2–7.7)
NEUTROPHILS NFR BLD AUTO: 59.4 %
NOROVIRUS GI + GII RNA STL NAA+PROBE: NOT DETECTED
NRBC BLD-RTO: 0 /100 WBCS (ref 0–0)
P AXIS: 71 DEGREES
P AXIS: 77 DEGREES
P OFFSET: 209 MS
P OFFSET: 210 MS
P ONSET: 154 MS
P ONSET: 157 MS
PLATELET # BLD AUTO: 314 X10*3/UL (ref 150–450)
POTASSIUM SERPL-SCNC: 3.9 MMOL/L (ref 3.5–5.3)
PR INTERVAL: 130 MS
PR INTERVAL: 132 MS
PROT SERPL-MCNC: 6.7 G/DL (ref 6.4–8.2)
Q ONSET: 220 MS
Q ONSET: 222 MS
QRS COUNT: 13 BEATS
QRS COUNT: 14 BEATS
QRS DURATION: 86 MS
QRS DURATION: 88 MS
QT INTERVAL: 344 MS
QT INTERVAL: 350 MS
QTC CALCULATION(BAZETT): 399 MS
QTC CALCULATION(BAZETT): 409 MS
QTC FREDERICIA: 382 MS
QTC FREDERICIA: 386 MS
R AXIS: 68 DEGREES
R AXIS: 73 DEGREES
RBC # BLD AUTO: 5.09 X10*6/UL (ref 4–5.2)
RH FACTOR (ANTIGEN D): NORMAL
RV RNA STL NAA+PROBE: NOT DETECTED
SALMONELLA DNA STL QL NAA+PROBE: NOT DETECTED
SHIGELLA DNA SPEC QL NAA+PROBE: NOT DETECTED
SODIUM SERPL-SCNC: 141 MMOL/L (ref 136–145)
T AXIS: 54 DEGREES
T AXIS: 56 DEGREES
T OFFSET: 394 MS
T OFFSET: 395 MS
V CHOLERAE DNA STL QL NAA+PROBE: NOT DETECTED
VENTRICULAR RATE: 78 BPM
VENTRICULAR RATE: 85 BPM
WBC # BLD AUTO: 8.3 X10*3/UL (ref 4.4–11.3)
Y ENTEROCOL DNA STL QL NAA+PROBE: NOT DETECTED

## 2023-12-25 PROCEDURE — 80053 COMPREHEN METABOLIC PANEL: CPT | Performed by: STUDENT IN AN ORGANIZED HEALTH CARE EDUCATION/TRAINING PROGRAM

## 2023-12-25 PROCEDURE — 2500000004 HC RX 250 GENERAL PHARMACY W/ HCPCS (ALT 636 FOR OP/ED): Performed by: STUDENT IN AN ORGANIZED HEALTH CARE EDUCATION/TRAINING PROGRAM

## 2023-12-25 PROCEDURE — 86901 BLOOD TYPING SEROLOGIC RH(D): CPT | Performed by: STUDENT IN AN ORGANIZED HEALTH CARE EDUCATION/TRAINING PROGRAM

## 2023-12-25 PROCEDURE — 87040 BLOOD CULTURE FOR BACTERIA: CPT | Performed by: STUDENT IN AN ORGANIZED HEALTH CARE EDUCATION/TRAINING PROGRAM

## 2023-12-25 PROCEDURE — 84132 ASSAY OF SERUM POTASSIUM: CPT | Performed by: STUDENT IN AN ORGANIZED HEALTH CARE EDUCATION/TRAINING PROGRAM

## 2023-12-25 PROCEDURE — 36415 COLL VENOUS BLD VENIPUNCTURE: CPT | Performed by: STUDENT IN AN ORGANIZED HEALTH CARE EDUCATION/TRAINING PROGRAM

## 2023-12-25 PROCEDURE — 85025 COMPLETE CBC W/AUTO DIFF WBC: CPT | Performed by: STUDENT IN AN ORGANIZED HEALTH CARE EDUCATION/TRAINING PROGRAM

## 2023-12-25 RX ORDER — IBUPROFEN 600 MG/1
600 TABLET ORAL EVERY 8 HOURS PRN
Qty: 30 TABLET | Refills: 0 | Status: SHIPPED | OUTPATIENT
Start: 2023-12-25

## 2023-12-25 RX ORDER — ACETAMINOPHEN 325 MG/1
650 TABLET ORAL EVERY 6 HOURS PRN
Qty: 30 TABLET | Refills: 0 | Status: SHIPPED | OUTPATIENT
Start: 2023-12-25

## 2023-12-25 NOTE — CONSULTS
Reason For Consult  Post-op pain     History Of Present Illness  Renetta Waldron is a 63 y.o. female 3 weeks s/p debridement of anterior mandible and placement of hardware, she is on IV ertapenem. Patient endorses pain 10/10 that started 3 days ago. Patient reports having diarrhea and feeling nauseous every time she has something to eat. She was transferred from [place] where she had blood work, CTA, EKG and CT facial bones.      Past Medical History  She has a past medical history of COPD (chronic obstructive pulmonary disease) (CMS/[facility]).    Surgical History  She has a past surgical history that includes Colonoscopy.     Social History  She reports that she has been smoking cigarettes. She has been smoking an average of .5 packs per day. She has never used smokeless tobacco. She reports current alcohol use. She reports that she does not use drugs.    Family History  No family history on file.     Allergies  Levonorgestrel-ethinyl estrad, Penicillins, Sulfa (sulfonamide antibiotics), Ciprofloxacin, Clavulanic acid, Azithromycin, Cephalexin, Esomeprazole, Levofloxacin, and Omeprazole    Review of Systems  Constitutional: Patient denies fever, chills, anorexia, weight loss/gain, malaise, fatigue.  Eyes: Patient denies blurry vision, drainage, diplopia, erythema, vision loss/change.  Ears: Patient denies changes in hearing, deafness, drainage from ears, pain, pruritis.  Nose: Patient denies epistaxis, pain, changes in smell.  Mouth/Neck: Patient reports pain 10/10 from anterior mandible. Patient denies changes in speech, odynophagia dysphagia, drooling, throat pain, inability to swallow.  Respiratory: Patient denies cough, hemoptysis, wheezing, shortness of breath, increased work of breathing.  Cardiac: Patient denies chest pain, dyspnea on exertion, orthopnea, palpitations, syncope.  Gastrointestinal: Patient denies nausea, vomiting, diarrhea, constipation, abdominal pain.  Genitourinary: Patient denies discharge,  dysuria, flank pain, increased frequency, hematuria.  Musculoskeletal: Patient denies decreased ROM, pain, swelling, stiffness, weakness.  Neurological: Patient denies dizziness, confusion, headache, seizures, syncope, areas of anesthesia or paresthesia.  Psychiatric: Patient denies mood changes, anxiety, hallucinations, sleep changes, suicidal ideation, homicidal ideation.  Skin: Patient denies masses, pain, pruritis, rash, ulcer.     Physical Exam  GENERAL: Well appearing. No acute distress. Well developed, well nourished.   HEAD: Symmetric facial features, no masses or lesions of scalp. Atraumatic, normocephalic. Skin without erythema, scarring, or rashes.  EYES: EOM intact, sclera normal, without conjunctival erythema or drainage, PERRLA.  EARS: Normal external ears, external auditory canals.TMs with translucency without visualized fluid buildup. Normal hearing to whispered voice and rubbed fingers.  NOSE: External nose midline, anterior rhinoscopy is normal with limited visualization to the anterior aspect. No bleeding or drainage, no lesions. Septum without deviation or distention. Sinuses non-tender to palpation bilaterally.  ORAL: < 5mm wound dehiscence in anterior mandible. Normal, moist mucus membranes. Normal floor of mouth without induration or raising. Normal tongue without laceration or edema. Normal oropharynx without asymmetry. Uvula symmetric. Pharyngeal walls wihotu masses or lesions. No fractures or avulsion of teeth. Stable and reproducible occlusion. No TMJ clicking or popping. Maximal incisal opening ~40mm. Patient tolerating own secretions.  NECK: Parotid and submandibular glands without edema or tenderness bilaterally. Patient tolerating oral secretions without issue. No lymphadenopathy of head or neck. Neck full range of motion without tenderness or posturing. Trachea midline without masses.  RESPIRATION/VOICE: Breathing comfortable on room air. No hoarseness, wheezing, stridor, or other  "abnormality. Ausculation of lungs clear bilaterally without crackles or rhonchi.  CARDIOVASCULAR: Regular rate and rhythm without extra heart sounds or murmurs. No clubbing, cyanosis, or edema in hands or feet. Strong radial pulses bilaterally. Skin shows adequate perfusion with capillary refill <2 seconds.  NEURO: Awake and alert. Oriented to person, place, and time. Cranial Nerves 2-12 grossly intact and symmetric bilaterally. No anesthesia or paresthesia of CN5. CN7 without deficits, with patient showing full range of facial expression.  ABDOMEN: Soft, non-distended, non-tender. No palpable hepatosplenomegaly.  EXTREMITIES: No lesions or abnormalities visualized,. Denies tenderness to palpation bilaterally.  PSYCH: Appropriate mood and affect.     Last Recorded Vitals  Blood pressure 113/80, pulse 87, temperature 36.7 °C (98.1 °F), resp. rate 16, height 1.676 m (5' 6\"), weight 64.9 kg (143 lb), SpO2 97 %.    Relevant Results  CT facial bones reviewed, no collection of fluid appreciated     Assessment/Plan   63 y.o Female presents to ED with ml of pain in anterior mandible due to wound dehiscence. Patient is 3 weeks s/p debridement of anterior mandible and placement of hardware.     - Irrigation with Betadine and saline done bedside   - Patient refused to have the wound packed with xeroform gauze     Recs:   - Maintain good oral hygiene and use of mouthwash TID after meals   - Tylenol q6h PRN pain   - Follow up with [facility] clinic on 12/26/23 @ 9:00 AM     Department of Oral & Maxillofacial Surgery  [address] ([employer] [facility] [employer] [place])  [place] [address]    Office phone number: [phone].  Office fax number: [phone].  Team Pager: [phone].  Patients can contact the [id number] through the hosptial  [phone].        Laura Harper DDS    "

## 2023-12-25 NOTE — ED PROVIDER NOTES
HPI   Chief Complaint   Patient presents with    Post-op Problem       Patient is a 63-year-old female with past medical history of COPD, tobacco use, recent debridement for osteomyelitis of the mandible at [facility] 12/3/2023 discharged home on IV antibiotics here with concern for wound dehiscence.  CT done at outside hospital of the soft tissue of the neck shows possible fluid collection within the lytic bone measuring 1.2 cm which could represent abscess, patient here to see OMFS.      History provided by:  Patient and medical records  History limited by: Pain.   used: No                        [place] Coma Scale Score: 15                  Patient History   Past Medical History:   Diagnosis Date    COPD (chronic obstructive pulmonary disease) (CMS/[facility])      Past Surgical History:   Procedure Laterality Date    COLONOSCOPY       No family history on file.  Social History     Tobacco Use    Smoking status: Every Day     Packs/day: .5     Types: Cigarettes    Smokeless tobacco: Never   Vaping Use    Vaping Use: Never used   Substance Use Topics    Alcohol use: Yes    Drug use: Never       Physical Exam   ED Triage Vitals   Temp Pulse Resp BP   -- -- -- --      SpO2 Temp src Heart Rate Source Patient Position   -- -- -- --      BP Location FiO2 (%)     -- --       Physical Exam    ED Course & MDM   Diagnoses as of 12/25/23 0152   Wound dehiscence       Medical Decision Making  Patient is a 63-year-old female here from outside hospital for oral surgical consultation in the setting of concern for wound dehiscence after recent osteomyelitis debridement.  Patient presents hemodynamically stable, no acute distress, mentating properly, afebrile and saturating well on room air.  Physical exam notable for an overall well-appearing female, with left-sided facial swelling, and an area of inflammation with an exposed suture over the left mandibular region, no submandibular swelling, or airway compromise.  She does  not look ill, but does note some night sweats, and myalgias, so blood cultures obtained to rule out endocarditis, she otherwise does not have symptoms of endocarditis with normal white blood cell count.  OMFS evaluated the patient, felt appropriate for her to be discharged on her IV ertapenem, with follow-up in the next 2 weeks, patient notes understanding, and is okay with this plan with strict return precautions given.  Her airway looks patent, no airway or respiratory concerns.    Amount and/or Complexity of Data Reviewed  External Data Reviewed: notes.  Labs: ordered.    Risk  Prescription drug management.        Procedure  Procedures    John Damon MD  Resident  12/25/23 0153  ---------------------------    This patient was seen by the resident physician. I have seen and examined the patient, agree with the workup, evaluation, management and diagnosis. The care plan has been discussed and I concur.    My assessment reveals the following:    HPI: Patient is a 62 y/o female presenting as transfer from [place] for OMFS evaluation. H/o multiple dental procedures since August with most recent one on 12/3 for debridement of left mandible for osteomyelitis. On Ertapenem daily right now. Went to [facility] ED for irritation/pain/possible loose suture in left mandible and CT saw an abscess vs. Seroma. No F/C/N/V. Does report night sweats.  Does mention some left axillary pain. Had negative CT PE study at [facility] along with troponin <3.     PE:  Vital signs reviewed in nursing triage note, EMR flowsheets, and at patient's bedside  GEN: Patient is awake, alert, calm, cooperative, and in mild painful distress.  HEAD: Normocephalic and atraumatic.  EYES: Anicteric sclera.  MOUTH: Mucous membranes moist. Mild TTP over left mandible. Sutures present on left lower gum line. No discharge or bleeding noted.   CV: Regular rate and rhythm. (+) s1/s2. No murmurs/rubs/gallops.  PULM: CTAB. No wheezes, rales, or crackles.  GI: Soft,  non-tender, non-distended without rebound or guarding.  EXT: No deformities noted.   NEURO: Moves all extremities.   SKIN: Warm, dry. No erythema or ecchymosis.    Medical Decision Making:  - OMFS consult  - Offered pain meds, but declined.  - Advised to take Ertapenem dose that she missed at 6pm.   - Follow up with OMFS  - Patient advised to return to the ED for any worsening or new symptoms.     Differential Diagnoses Considered: Post-op complication, Abscess, Seroma.     Chronic Medical Conditions Significantly Affecting Care: Tooth/jaw infections.     External Records Reviewed: I reviewed recent and relevant outside records including: ED provider note and lab and imaging results from [facility] ED visit.      Escalation of Care:  Appropriate for outpatient management    Prescription Drug Consideration: Pain meds    Discussion of Management with Other Providers:   I discussed the patient/results with: MD Dilip Mustafa MD  12/25/23 1562

## 2023-12-25 NOTE — ED TRIAGE NOTES
PT to the ED for OMFS consult from [place]. PT had oral surgery done 12/3, but now has noticed a bone sticking out in her mouth. PT received IV ABX from her PICC. Airway intact, patient controlling oral secretions. Additional complaint of unable to tolerate PO.

## 2023-12-26 LAB
ANION GAP BLDV CALCULATED.4IONS-SCNC: 7 MMOL/L (ref 10–25)
BASE EXCESS BLDV CALC-SCNC: 5.2 MMOL/L (ref -2–3)
BODY TEMPERATURE: 37 DEGREES CELSIUS
CA-I BLDV-SCNC: 1.23 MMOL/L (ref 1.1–1.33)
CHLORIDE BLDV-SCNC: 105 MMOL/L (ref 98–107)
FUNGUS SPEC CULT: NORMAL
FUNGUS SPEC FUNGUS STN: NORMAL
GLUCOSE BLDV-MCNC: 96 MG/DL (ref 74–99)
HCO3 BLDV-SCNC: 30.5 MMOL/L (ref 22–26)
HCT VFR BLD EST: 44 % (ref 36–46)
HGB BLDV-MCNC: 14.5 G/DL (ref 12–16)
INHALED O2 CONCENTRATION: 21 %
LACTATE BLDV-SCNC: 0.8 MMOL/L (ref 0.4–2)
OXYHGB MFR BLDV: 60.6 % (ref 45–75)
PCO2 BLDV: 46 MM HG (ref 41–51)
PH BLDV: 7.43 PH (ref 7.33–7.43)
PO2 BLDV: 38 MM HG (ref 35–45)
POTASSIUM BLDV-SCNC: 4.2 MMOL/L (ref 3.5–5.3)
SAO2 % BLDV: 67 % (ref 45–75)
SODIUM BLDV-SCNC: 138 MMOL/L (ref 136–145)

## 2023-12-29 LAB
BACTERIA BLD CULT: NORMAL
BACTERIA BLD CULT: NORMAL

## 2024-03-13 ENCOUNTER — PREP FOR PROCEDURE (OUTPATIENT)
Dept: SURGERY | Facility: HOSPITAL | Age: 64
End: 2024-03-13
Payer: OTHER GOVERNMENT

## 2024-03-13 ENCOUNTER — HOSPITAL ENCOUNTER (OUTPATIENT)
Facility: HOSPITAL | Age: 64
Setting detail: OUTPATIENT SURGERY
End: 2024-03-13
Attending: DENTIST | Admitting: DENTIST
Payer: OTHER GOVERNMENT

## 2024-03-13 DIAGNOSIS — M86.9 OSTEOMYELITIS OF OTHER SITE, UNSPECIFIED TYPE (MULTI): Primary | ICD-10-CM

## 2024-03-14 ENCOUNTER — APPOINTMENT (OUTPATIENT)
Dept: RADIOLOGY | Facility: HOSPITAL | Age: 64
End: 2024-03-14
Payer: OTHER GOVERNMENT

## 2024-03-14 ENCOUNTER — HOSPITAL ENCOUNTER (EMERGENCY)
Facility: HOSPITAL | Age: 64
Discharge: HOME | End: 2024-03-14
Attending: STUDENT IN AN ORGANIZED HEALTH CARE EDUCATION/TRAINING PROGRAM
Payer: OTHER GOVERNMENT

## 2024-03-14 VITALS
DIASTOLIC BLOOD PRESSURE: 79 MMHG | OXYGEN SATURATION: 97 % | WEIGHT: 146 LBS | HEIGHT: 66 IN | RESPIRATION RATE: 16 BRPM | SYSTOLIC BLOOD PRESSURE: 150 MMHG | TEMPERATURE: 97.7 F | HEART RATE: 71 BPM | BODY MASS INDEX: 23.46 KG/M2

## 2024-03-14 DIAGNOSIS — K13.79 MOUTH PAIN: Primary | ICD-10-CM

## 2024-03-14 LAB
ANION GAP SERPL CALC-SCNC: 12 MMOL/L (ref 10–20)
BASOPHILS # BLD AUTO: 0.03 X10*3/UL (ref 0–0.1)
BASOPHILS NFR BLD AUTO: 0.5 %
BUN SERPL-MCNC: 13 MG/DL (ref 6–23)
CALCIUM SERPL-MCNC: 9.5 MG/DL (ref 8.6–10.3)
CHLORIDE SERPL-SCNC: 104 MMOL/L (ref 98–107)
CO2 SERPL-SCNC: 27 MMOL/L (ref 21–32)
CREAT SERPL-MCNC: 0.73 MG/DL (ref 0.5–1.05)
EGFRCR SERPLBLD CKD-EPI 2021: >90 ML/MIN/1.73M*2
EOSINOPHIL # BLD AUTO: 0.08 X10*3/UL (ref 0–0.7)
EOSINOPHIL NFR BLD AUTO: 1.3 %
ERYTHROCYTE [DISTWIDTH] IN BLOOD BY AUTOMATED COUNT: 14.9 % (ref 11.5–14.5)
GLUCOSE SERPL-MCNC: 85 MG/DL (ref 74–99)
HCT VFR BLD AUTO: 46 % (ref 36–46)
HGB BLD-MCNC: 15.1 G/DL (ref 12–16)
IMM GRANULOCYTES # BLD AUTO: 0.01 X10*3/UL (ref 0–0.7)
IMM GRANULOCYTES NFR BLD AUTO: 0.2 % (ref 0–0.9)
LYMPHOCYTES # BLD AUTO: 1.79 X10*3/UL (ref 1.2–4.8)
LYMPHOCYTES NFR BLD AUTO: 28.5 %
MAGNESIUM SERPL-MCNC: 2.04 MG/DL (ref 1.6–2.4)
MCH RBC QN AUTO: 27.3 PG (ref 26–34)
MCHC RBC AUTO-ENTMCNC: 32.8 G/DL (ref 32–36)
MCV RBC AUTO: 83 FL (ref 80–100)
MONOCYTES # BLD AUTO: 0.41 X10*3/UL (ref 0.1–1)
MONOCYTES NFR BLD AUTO: 6.5 %
NEUTROPHILS # BLD AUTO: 3.96 X10*3/UL (ref 1.2–7.7)
NEUTROPHILS NFR BLD AUTO: 63 %
NRBC BLD-RTO: 0 /100 WBCS (ref 0–0)
PLATELET # BLD AUTO: 259 X10*3/UL (ref 150–450)
POTASSIUM SERPL-SCNC: 4.2 MMOL/L (ref 3.5–5.3)
RBC # BLD AUTO: 5.53 X10*6/UL (ref 4–5.2)
SODIUM SERPL-SCNC: 139 MMOL/L (ref 136–145)
WBC # BLD AUTO: 6.3 X10*3/UL (ref 4.4–11.3)

## 2024-03-14 PROCEDURE — 80048 BASIC METABOLIC PNL TOTAL CA: CPT | Performed by: STUDENT IN AN ORGANIZED HEALTH CARE EDUCATION/TRAINING PROGRAM

## 2024-03-14 PROCEDURE — 83735 ASSAY OF MAGNESIUM: CPT | Performed by: STUDENT IN AN ORGANIZED HEALTH CARE EDUCATION/TRAINING PROGRAM

## 2024-03-14 PROCEDURE — 99285 EMERGENCY DEPT VISIT HI MDM: CPT | Mod: 25 | Performed by: STUDENT IN AN ORGANIZED HEALTH CARE EDUCATION/TRAINING PROGRAM

## 2024-03-14 PROCEDURE — 85025 COMPLETE CBC W/AUTO DIFF WBC: CPT | Performed by: STUDENT IN AN ORGANIZED HEALTH CARE EDUCATION/TRAINING PROGRAM

## 2024-03-14 PROCEDURE — 70460 CT HEAD/BRAIN W/DYE: CPT

## 2024-03-14 PROCEDURE — 96361 HYDRATE IV INFUSION ADD-ON: CPT | Performed by: STUDENT IN AN ORGANIZED HEALTH CARE EDUCATION/TRAINING PROGRAM

## 2024-03-14 PROCEDURE — 2550000001 HC RX 255 CONTRASTS: Performed by: STUDENT IN AN ORGANIZED HEALTH CARE EDUCATION/TRAINING PROGRAM

## 2024-03-14 PROCEDURE — 96374 THER/PROPH/DIAG INJ IV PUSH: CPT | Performed by: STUDENT IN AN ORGANIZED HEALTH CARE EDUCATION/TRAINING PROGRAM

## 2024-03-14 PROCEDURE — 70487 CT MAXILLOFACIAL W/DYE: CPT

## 2024-03-14 PROCEDURE — 2500000004 HC RX 250 GENERAL PHARMACY W/ HCPCS (ALT 636 FOR OP/ED): Performed by: STUDENT IN AN ORGANIZED HEALTH CARE EDUCATION/TRAINING PROGRAM

## 2024-03-14 RX ORDER — ACETAMINOPHEN 325 MG/1
975 TABLET ORAL ONCE
Status: COMPLETED | OUTPATIENT
Start: 2024-03-14 | End: 2024-03-14

## 2024-03-14 RX ORDER — ERTAPENEM 1 G/1
1 INJECTION, POWDER, LYOPHILIZED, FOR SOLUTION INTRAMUSCULAR; INTRAVENOUS ONCE
Status: DISCONTINUED | OUTPATIENT
Start: 2024-03-14 | End: 2024-03-14 | Stop reason: HOSPADM

## 2024-03-14 RX ORDER — KETOROLAC TROMETHAMINE 30 MG/ML
15 INJECTION, SOLUTION INTRAMUSCULAR; INTRAVENOUS ONCE
Status: COMPLETED | OUTPATIENT
Start: 2024-03-14 | End: 2024-03-14

## 2024-03-14 RX ADMIN — SODIUM CHLORIDE, POTASSIUM CHLORIDE, SODIUM LACTATE AND CALCIUM CHLORIDE 1000 ML: 600; 310; 30; 20 INJECTION, SOLUTION INTRAVENOUS at 16:30

## 2024-03-14 RX ADMIN — ACETAMINOPHEN 975 MG: 325 TABLET ORAL at 16:28

## 2024-03-14 RX ADMIN — IOHEXOL 75 ML: 350 INJECTION, SOLUTION INTRAVENOUS at 17:14

## 2024-03-14 RX ADMIN — KETOROLAC TROMETHAMINE 15 MG: 30 INJECTION, SOLUTION INTRAMUSCULAR at 16:30

## 2024-03-14 ASSESSMENT — PAIN SCALES - GENERAL
PAINLEVEL_OUTOF10: 6
PAINLEVEL_OUTOF10: 6

## 2024-03-14 ASSESSMENT — PAIN - FUNCTIONAL ASSESSMENT
PAIN_FUNCTIONAL_ASSESSMENT: 0-10
PAIN_FUNCTIONAL_ASSESSMENT: 0-10

## 2024-03-14 ASSESSMENT — COLUMBIA-SUICIDE SEVERITY RATING SCALE - C-SSRS
6. HAVE YOU EVER DONE ANYTHING, STARTED TO DO ANYTHING, OR PREPARED TO DO ANYTHING TO END YOUR LIFE?: NO
2. HAVE YOU ACTUALLY HAD ANY THOUGHTS OF KILLING YOURSELF?: NO
1. IN THE PAST MONTH, HAVE YOU WISHED YOU WERE DEAD OR WISHED YOU COULD GO TO SLEEP AND NOT WAKE UP?: NO

## 2024-03-14 ASSESSMENT — LIFESTYLE VARIABLES
EVER FELT BAD OR GUILTY ABOUT YOUR DRINKING: NO
HAVE PEOPLE ANNOYED YOU BY CRITICIZING YOUR DRINKING: NO
HAVE YOU EVER FELT YOU SHOULD CUT DOWN ON YOUR DRINKING: NO
EVER HAD A DRINK FIRST THING IN THE MORNING TO STEADY YOUR NERVES TO GET RID OF A HANGOVER: NO

## 2024-03-14 ASSESSMENT — PAIN DESCRIPTION - LOCATION: LOCATION: MOUTH

## 2024-03-14 ASSESSMENT — PAIN DESCRIPTION - PAIN TYPE: TYPE: ACUTE PAIN

## 2024-03-14 ASSESSMENT — PAIN DESCRIPTION - DESCRIPTORS: DESCRIPTORS: ACHING

## 2024-03-14 NOTE — ED TRIAGE NOTES
Pt to ED for c/o jaw swelling and concern for abscess.  [facility].  Respirations even and unlabored.  No acute distress noted at this time.  Denies SOB.  A&Ox4.  VSS.

## 2024-03-14 NOTE — ED PROVIDER NOTES
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  Patient reports that she has had jaw pain as well as had a foul smell and taste in her mouth.  She reports that her right eye has been slightly blurry but denies any vision cuts, double vision, headache, or focal numbness tingling or weakness.  Denies any difficulty swallowing or breathing denies any fevers.  Patient denies any neck stiffness.  Patient called her oral surgeon who recommended going to hospital to get a CAT scan given her history of severe osteomyelitis recently.     PAST MEDICAL HISTORY:  as per HPI  ALLERGIES:  as per HPI  MEDICATIONS:  as per HPI  FAMILY HISTORY: as per HPI  SURGICAL HISTORY: as per HPI  SOCIAL HISTORY: as per HPI     PHYSICAL EXAM:  VITAL SIGNS: Nursing notes reviewed.  GENERAL:  Alert and interactive  EYES:   Eyes track.  Visual fields intact in all 4 quadrants of both eyes.  ENT:  Airway patent.  No drainage in mouth.  No swelling in the posterior oropharynx.  No elevation of tongue.  NECK: No meningismus  RESPIRATORY:  Nonlabored breathing.  CARDIOVASCULAR:  [Regular rate.] [Regular rhythm.]  GASTROINTESTINAL:  No distension.  MUSCULOSKELETAL:  No deformity.   NEUROLOGICAL:  Awake.  Tracks with eyes, PERRL, EOMI, equal sensation in V1-V3, no facial droop, sounds equal in both ears, 5/5 w/ strength shoulder raise, tongue protrudes at midline, soft palate raises symmetrically 5/5 strength in UE and LE, no deficit with light touch, normal finger to nose and heel to clarke, negative Romberg, normal gait.  NIH 0.  SKIN:  Dry.        MEDICAL DECISION MAKING (MDM):       DIAGNOSTIC STUDIES  Labs: BMP, CBC, Elysium level  Radiology: CT venogram, CT face with contrast     EKG  Per my interpretation:  Electrocardiogram ECG  RATE:  [Normal]  RHYTHM: [Sinus]  AXIS:  [Normal]  INTERVALS: [Normal]  ST-T WAVE CHANGES: [Normal]  ABNORMALITIES/COMPARISON: Unchanged from most recent EKG in December 24, 2020       REVIEW OF OLD RECORDS: Reviewed the DC summary from 12/6/2023     SUMMARY:  The patient is admitted to the Emergency Department for evaluation of above. Complete history and physical examination was performed by me.  Patient presents with concern for dental infection with a concerning history.  I did send her for CT of the face but given she reports some sensory changes, I am worried that it may have progressed and caused cavernous sinus thrombosis so I did send her for CT venogram as well.  Patient has a nonfocal neurologic exam pointing way from stroke.  No photophobia or signs of iritis.  She reports no eye pain pointing away from corneal abrasion.  Doubt acute glaucoma.  Doubt optic neuritis.  No seizure-like activity so doubt this was an aura causing seizure.  Patient was given fluids Toradol and Tylenol for symptoms.    Patient was reassessed and reported improvement in her pain.  Blood work was reassuring and did not show leukocytosis Nor did show clinically significant electrolyte abnormality. CT scan does not show any evidence of dural venous thrombus or cavernous sinus thrombus but there was concerning evidence of an infection at the site of her hardware.  I paged OMFS through the transfer center and spoke with Dr. Salcedo.  He had me send pictures of the patient's chin and mouth so that he could evaluate the patient and he thinks that these changes are likely due to healing and are not due to an acute infection especially given her reassuring blood work.  He thinks that some of the pain could be from the tissues shrinking around the titanium plate.  He did not recommend IV antibiotics given the patient's history of allergies related to most of antibiotics that would cover the floor in the mouth given that he has a low suspicion for infection.  He did recommend  urgent follow-up in clinic with him tomorrow.  This was discussed with the patient.  I recommended a low threshold to come back to the ER with any fevers worsening pain or swelling difficulty breathing or elevation of the tongue.  The work-up and plan were discussed with the patient/caregiver and questions were answered regarding the ED visit.  Educational materials were provided as well as return precautions including returning for any persisting or worsening symptoms.  Patient was recommended to follow-up with PCP in the next few days in addition to any potential specialists that were discussed.  The patient/family expressed understanding and agreed to the described plan.  Patient was discharged in stable condition.     DIAGNOSIS:    Mouth pain     DISPOSITION:    1) discharged  [2) Please see Exit Care and discharge instructions for complete details.]     Tam Rey MD  03/14/24 1475

## 2024-03-15 NOTE — ED NOTES
Pt aware that [facility] is trying to find accepting facility. Pt becoming inpatient with ED wait time, pt currently outside of ER on on cell phone     Elif Baltazar RN  03/14/24 2023

## 2024-03-16 ENCOUNTER — HOSPITAL ENCOUNTER (OUTPATIENT)
Dept: CARDIOLOGY | Facility: HOSPITAL | Age: 64
Discharge: HOME | End: 2024-03-16
Payer: OTHER GOVERNMENT

## 2024-03-16 LAB
ATRIAL RATE: 85 BPM
P AXIS: 78 DEGREES
P OFFSET: 208 MS
P ONSET: 153 MS
PR INTERVAL: 138 MS
Q ONSET: 222 MS
QRS COUNT: 14 BEATS
QRS DURATION: 82 MS
QT INTERVAL: 344 MS
QTC CALCULATION(BAZETT): 409 MS
QTC FREDERICIA: 386 MS
R AXIS: 94 DEGREES
T AXIS: 67 DEGREES
T OFFSET: 394 MS
VENTRICULAR RATE: 85 BPM

## 2024-03-16 PROCEDURE — 93005 ELECTROCARDIOGRAM TRACING: CPT

## 2024-03-22 ENCOUNTER — APPOINTMENT (OUTPATIENT)
Dept: ALLERGY | Facility: CLINIC | Age: 64
End: 2024-03-22
Payer: OTHER GOVERNMENT

## 2024-09-04 ENCOUNTER — APPOINTMENT (OUTPATIENT)
Dept: CARDIOLOGY | Facility: HOSPITAL | Age: 64
End: 2024-09-04
Payer: OTHER GOVERNMENT

## 2024-09-04 ENCOUNTER — APPOINTMENT (OUTPATIENT)
Dept: RADIOLOGY | Facility: HOSPITAL | Age: 64
End: 2024-09-04
Payer: OTHER GOVERNMENT

## 2024-09-04 ENCOUNTER — HOSPITAL ENCOUNTER (EMERGENCY)
Facility: HOSPITAL | Age: 64
Discharge: HOME | End: 2024-09-04
Attending: EMERGENCY MEDICINE
Payer: OTHER GOVERNMENT

## 2024-09-04 VITALS
SYSTOLIC BLOOD PRESSURE: 108 MMHG | TEMPERATURE: 97.7 F | DIASTOLIC BLOOD PRESSURE: 65 MMHG | HEIGHT: 66 IN | BODY MASS INDEX: 24.11 KG/M2 | HEART RATE: 79 BPM | WEIGHT: 150 LBS | RESPIRATION RATE: 16 BRPM | OXYGEN SATURATION: 96 %

## 2024-09-04 DIAGNOSIS — R10.11 RIGHT UPPER QUADRANT ABDOMINAL PAIN: Primary | ICD-10-CM

## 2024-09-04 LAB
ALBUMIN SERPL BCP-MCNC: 4.3 G/DL (ref 3.4–5)
ALP SERPL-CCNC: 74 U/L (ref 33–136)
ALT SERPL W P-5'-P-CCNC: 7 U/L (ref 7–45)
AMYLASE SERPL-CCNC: 43 U/L (ref 29–103)
ANION GAP SERPL CALC-SCNC: 11 MMOL/L (ref 10–20)
APPEARANCE UR: CLEAR
AST SERPL W P-5'-P-CCNC: 12 U/L (ref 9–39)
ATRIAL RATE: 68 BPM
BASOPHILS # BLD AUTO: 0.02 X10*3/UL (ref 0–0.1)
BASOPHILS NFR BLD AUTO: 0.2 %
BILIRUB SERPL-MCNC: 1.2 MG/DL (ref 0–1.2)
BILIRUB UR STRIP.AUTO-MCNC: NEGATIVE MG/DL
BUN SERPL-MCNC: 10 MG/DL (ref 6–23)
C COLI+JEJ+UPSA DNA STL QL NAA+PROBE: NOT DETECTED
CALCIUM SERPL-MCNC: 9.4 MG/DL (ref 8.6–10.3)
CARDIAC TROPONIN I PNL SERPL HS: <3 NG/L (ref 0–13)
CHLORIDE SERPL-SCNC: 102 MMOL/L (ref 98–107)
CO2 SERPL-SCNC: 27 MMOL/L (ref 21–32)
COLOR UR: YELLOW
CREAT SERPL-MCNC: 0.9 MG/DL (ref 0.5–1.05)
EC STX1 GENE STL QL NAA+PROBE: NOT DETECTED
EC STX2 GENE STL QL NAA+PROBE: NOT DETECTED
EGFRCR SERPLBLD CKD-EPI 2021: 72 ML/MIN/1.73M*2
EOSINOPHIL # BLD AUTO: 0.14 X10*3/UL (ref 0–0.7)
EOSINOPHIL NFR BLD AUTO: 1.6 %
ERYTHROCYTE [DISTWIDTH] IN BLOOD BY AUTOMATED COUNT: 14.3 % (ref 11.5–14.5)
GLUCOSE SERPL-MCNC: 119 MG/DL (ref 74–99)
GLUCOSE UR STRIP.AUTO-MCNC: NORMAL MG/DL
HCT VFR BLD AUTO: 47.3 % (ref 36–46)
HGB BLD-MCNC: 15.6 G/DL (ref 12–16)
HOLD SPECIMEN: NORMAL
HYALINE CASTS #/AREA URNS AUTO: ABNORMAL /LPF
IMM GRANULOCYTES # BLD AUTO: 0.02 X10*3/UL (ref 0–0.7)
IMM GRANULOCYTES NFR BLD AUTO: 0.2 % (ref 0–0.9)
KETONES UR STRIP.AUTO-MCNC: NEGATIVE MG/DL
LACTATE SERPL-SCNC: 1 MMOL/L (ref 0.4–2)
LEUKOCYTE ESTERASE UR QL STRIP.AUTO: NEGATIVE
LIPASE SERPL-CCNC: 25 U/L (ref 9–82)
LYMPHOCYTES # BLD AUTO: 2.37 X10*3/UL (ref 1.2–4.8)
LYMPHOCYTES NFR BLD AUTO: 27.4 %
MCH RBC QN AUTO: 28.3 PG (ref 26–34)
MCHC RBC AUTO-ENTMCNC: 33 G/DL (ref 32–36)
MCV RBC AUTO: 86 FL (ref 80–100)
MONOCYTES # BLD AUTO: 0.64 X10*3/UL (ref 0.1–1)
MONOCYTES NFR BLD AUTO: 7.4 %
MUCOUS THREADS #/AREA URNS AUTO: ABNORMAL /LPF
NEUTROPHILS # BLD AUTO: 5.45 X10*3/UL (ref 1.2–7.7)
NEUTROPHILS NFR BLD AUTO: 63.2 %
NITRITE UR QL STRIP.AUTO: NEGATIVE
NOROVIRUS GI + GII RNA STL NAA+PROBE: NOT DETECTED
NRBC BLD-RTO: 0 /100 WBCS (ref 0–0)
P AXIS: 73 DEGREES
P OFFSET: 206 MS
P ONSET: 151 MS
PH UR STRIP.AUTO: 5.5 [PH]
PLATELET # BLD AUTO: 249 X10*3/UL (ref 150–450)
POTASSIUM SERPL-SCNC: 3.8 MMOL/L (ref 3.5–5.3)
PR INTERVAL: 142 MS
PROT SERPL-MCNC: 7.2 G/DL (ref 6.4–8.2)
PROT UR STRIP.AUTO-MCNC: ABNORMAL MG/DL
Q ONSET: 222 MS
QRS COUNT: 11 BEATS
QRS DURATION: 84 MS
QT INTERVAL: 374 MS
QTC CALCULATION(BAZETT): 397 MS
QTC FREDERICIA: 390 MS
R AXIS: 67 DEGREES
RBC # BLD AUTO: 5.51 X10*6/UL (ref 4–5.2)
RBC # UR STRIP.AUTO: NEGATIVE /UL
RBC #/AREA URNS AUTO: ABNORMAL /HPF
RV RNA STL NAA+PROBE: NOT DETECTED
SALMONELLA DNA STL QL NAA+PROBE: NOT DETECTED
SHIGELLA DNA SPEC QL NAA+PROBE: NOT DETECTED
SODIUM SERPL-SCNC: 136 MMOL/L (ref 136–145)
SP GR UR STRIP.AUTO: 1.02
SQUAMOUS #/AREA URNS AUTO: ABNORMAL /HPF
T AXIS: 46 DEGREES
T OFFSET: 409 MS
UROBILINOGEN UR STRIP.AUTO-MCNC: ABNORMAL MG/DL
V CHOLERAE DNA STL QL NAA+PROBE: NOT DETECTED
VENTRICULAR RATE: 68 BPM
WBC # BLD AUTO: 8.6 X10*3/UL (ref 4.4–11.3)
WBC #/AREA URNS AUTO: ABNORMAL /HPF
Y ENTEROCOL DNA STL QL NAA+PROBE: NOT DETECTED

## 2024-09-04 PROCEDURE — 81001 URINALYSIS AUTO W/SCOPE: CPT | Performed by: EMERGENCY MEDICINE

## 2024-09-04 PROCEDURE — 2500000004 HC RX 250 GENERAL PHARMACY W/ HCPCS (ALT 636 FOR OP/ED): Performed by: EMERGENCY MEDICINE

## 2024-09-04 PROCEDURE — 74177 CT ABD & PELVIS W/CONTRAST: CPT

## 2024-09-04 PROCEDURE — 96375 TX/PRO/DX INJ NEW DRUG ADDON: CPT

## 2024-09-04 PROCEDURE — 96361 HYDRATE IV INFUSION ADD-ON: CPT

## 2024-09-04 PROCEDURE — 87506 IADNA-DNA/RNA PROBE TQ 6-11: CPT | Mod: ELYLAB | Performed by: EMERGENCY MEDICINE

## 2024-09-04 PROCEDURE — 85025 COMPLETE CBC W/AUTO DIFF WBC: CPT | Performed by: EMERGENCY MEDICINE

## 2024-09-04 PROCEDURE — 76705 ECHO EXAM OF ABDOMEN: CPT

## 2024-09-04 PROCEDURE — 93005 ELECTROCARDIOGRAM TRACING: CPT

## 2024-09-04 PROCEDURE — 96374 THER/PROPH/DIAG INJ IV PUSH: CPT

## 2024-09-04 PROCEDURE — 2550000001 HC RX 255 CONTRASTS: Performed by: EMERGENCY MEDICINE

## 2024-09-04 PROCEDURE — 82150 ASSAY OF AMYLASE: CPT | Performed by: EMERGENCY MEDICINE

## 2024-09-04 PROCEDURE — 80053 COMPREHEN METABOLIC PANEL: CPT | Performed by: EMERGENCY MEDICINE

## 2024-09-04 PROCEDURE — 74177 CT ABD & PELVIS W/CONTRAST: CPT | Performed by: RADIOLOGY

## 2024-09-04 PROCEDURE — 76705 ECHO EXAM OF ABDOMEN: CPT | Performed by: RADIOLOGY

## 2024-09-04 PROCEDURE — 83690 ASSAY OF LIPASE: CPT | Performed by: EMERGENCY MEDICINE

## 2024-09-04 PROCEDURE — 84484 ASSAY OF TROPONIN QUANT: CPT | Performed by: EMERGENCY MEDICINE

## 2024-09-04 PROCEDURE — 83605 ASSAY OF LACTIC ACID: CPT | Performed by: EMERGENCY MEDICINE

## 2024-09-04 PROCEDURE — 99285 EMERGENCY DEPT VISIT HI MDM: CPT | Mod: 25

## 2024-09-04 PROCEDURE — 36415 COLL VENOUS BLD VENIPUNCTURE: CPT | Performed by: EMERGENCY MEDICINE

## 2024-09-04 RX ORDER — ONDANSETRON HYDROCHLORIDE 2 MG/ML
4 INJECTION, SOLUTION INTRAVENOUS ONCE
Status: COMPLETED | OUTPATIENT
Start: 2024-09-04 | End: 2024-09-04

## 2024-09-04 RX ORDER — MORPHINE SULFATE 4 MG/ML
4 INJECTION, SOLUTION INTRAMUSCULAR; INTRAVENOUS ONCE
Status: COMPLETED | OUTPATIENT
Start: 2024-09-04 | End: 2024-09-04

## 2024-09-04 RX ORDER — HYDROMORPHONE HYDROCHLORIDE 1 MG/ML
1 INJECTION, SOLUTION INTRAMUSCULAR; INTRAVENOUS; SUBCUTANEOUS ONCE
Status: DISCONTINUED | OUTPATIENT
Start: 2024-09-04 | End: 2024-09-04 | Stop reason: HOSPADM

## 2024-09-04 ASSESSMENT — PAIN DESCRIPTION - ORIENTATION: ORIENTATION: RIGHT;UPPER

## 2024-09-04 ASSESSMENT — PAIN - FUNCTIONAL ASSESSMENT
PAIN_FUNCTIONAL_ASSESSMENT: 0-10
PAIN_FUNCTIONAL_ASSESSMENT: 0-10

## 2024-09-04 ASSESSMENT — PAIN SCALES - GENERAL
PAINLEVEL_OUTOF10: 7
PAINLEVEL_OUTOF10: 6
PAINLEVEL_OUTOF10: 7

## 2024-09-04 ASSESSMENT — LIFESTYLE VARIABLES
HAVE YOU EVER FELT YOU SHOULD CUT DOWN ON YOUR DRINKING: NO
TOTAL SCORE: 0
EVER FELT BAD OR GUILTY ABOUT YOUR DRINKING: NO
EVER HAD A DRINK FIRST THING IN THE MORNING TO STEADY YOUR NERVES TO GET RID OF A HANGOVER: NO
HAVE PEOPLE ANNOYED YOU BY CRITICIZING YOUR DRINKING: NO

## 2024-09-04 ASSESSMENT — PAIN DESCRIPTION - DESCRIPTORS: DESCRIPTORS: SHARP;DULL

## 2024-09-04 ASSESSMENT — PAIN DESCRIPTION - PAIN TYPE: TYPE: ACUTE PAIN

## 2024-09-04 ASSESSMENT — PAIN DESCRIPTION - LOCATION: LOCATION: ABDOMEN

## 2024-09-04 ASSESSMENT — PAIN DESCRIPTION - DIRECTION: RADIATING_TOWARDS: RECTUM

## 2024-09-04 ASSESSMENT — COLUMBIA-SUICIDE SEVERITY RATING SCALE - C-SSRS
1. IN THE PAST MONTH, HAVE YOU WISHED YOU WERE DEAD OR WISHED YOU COULD GO TO SLEEP AND NOT WAKE UP?: NO
6. HAVE YOU EVER DONE ANYTHING, STARTED TO DO ANYTHING, OR PREPARED TO DO ANYTHING TO END YOUR LIFE?: NO
2. HAVE YOU ACTUALLY HAD ANY THOUGHTS OF KILLING YOURSELF?: NO

## 2024-09-04 NOTE — ED PROVIDER NOTES
"HPI   Chief Complaint   Patient presents with    Abdominal Pain     Upper right abdominal pain \"that goes into my rectum\" It's been going on for about 6 months. Pt brought her own stool sample.        Chief complaint abdominal pain  History of present illness 64-year-old female here with right upper quadrant abdominal pain that radiates down to her rectum.  She is here for assessment with blood pressure 148/70 temp 36 pulse 107 respirate 18 pulse ox 96% patient denies a history of ulcers cholelithiasis.  Her bowel obstruction hernia surgical history as far as the bowel.              Patient History   Past Medical History:   Diagnosis Date    COPD (chronic obstructive pulmonary disease) ([facility])      Past Surgical History:   Procedure Laterality Date    COLONOSCOPY       No family history on file.  Social History     Tobacco Use    Smoking status: Every Day     Current packs/day: 0.50     Average packs/day: 0.5 packs/day for 15.2 years (7.6 ttl pk-yrs)     Types: Cigarettes     Start date: 9/9/2009    Smokeless tobacco: Never   Vaping Use    Vaping status: Never Used   Substance Use Topics    Alcohol use: Not Currently    Drug use: Never       Physical Exam   ED Triage Vitals [09/04/24 0450]   Temperature Heart Rate Respirations BP   36.5 °C (97.7 °F) (!) 107 18 148/70      Pulse Ox Temp Source Heart Rate Source Patient Position   96 % Temporal Monitor Sitting      BP Location FiO2 (%)     Right arm --       Physical Exam  Vitals and nursing note reviewed. Exam conducted with a chaperone present.   Constitutional:       General: She is in acute distress.      Appearance: Normal appearance. She is ill-appearing.   HENT:      Head: Normocephalic and atraumatic.      Nose: Nose normal.      Mouth/Throat:      Mouth: Mucous membranes are moist.   Eyes:      Pupils: Pupils are equal, round, and reactive to light.   Cardiovascular:      Rate and Rhythm: Normal rate and regular rhythm.   Pulmonary:      Effort: Pulmonary " effort is normal.   Abdominal:      General: There is distension.      Palpations: Abdomen is soft.      Tenderness: There is generalized abdominal tenderness and tenderness in the epigastric area.   Musculoskeletal:         General: Normal range of motion.      Cervical back: Normal range of motion.   Skin:     General: Skin is warm and dry.      Capillary Refill: Capillary refill takes less than 2 seconds.   Neurological:      General: No focal deficit present.      Mental Status: She is alert.   Psychiatric:         Mood and Affect: Mood normal.           ED Course & MDM   Diagnoses as of 11/13/24 2124   Right upper quadrant abdominal pain                 No data recorded     [place] Coma Scale Score: 15 (09/04/24 0447 : Maryam Sullivan RN)                           Medical Decision Making  Differential diagnosis considered for this patient  #1 diverticulitis  #2 ureteral stone  #3 pneumoperitoneum  #4 pancreatitis  #5 urinary tract infection  #6 bowel obstruction  #7 terminal ileitis  Considering the above differential diagnosis following testing treatments were considered in order with shared decision-making  CT of the abdomen pelvis IV contrast IV fluid bolus Zofran IV Dilaudid IV  Urinalysis, CBC hepatic profile lactic acid amylase lipase       Amount and/or Complexity of Data Reviewed  Radiology: ordered and independent interpretation performed.     Details: CT of the abdomen revealed a diverticulitis      Labs Reviewed   CBC WITH AUTO DIFFERENTIAL - Abnormal       Result Value    WBC 8.6      nRBC 0.0      RBC 5.51 (*)     Hemoglobin 15.6      Hematocrit 47.3 (*)     MCV 86      MCH 28.3      MCHC 33.0      RDW 14.3      Platelets 249      Neutrophils % 63.2      Immature Granulocytes %, Automated 0.2      Lymphocytes % 27.4      Monocytes % 7.4      Eosinophils % 1.6      Basophils % 0.2      Neutrophils Absolute 5.45      Immature Granulocytes Absolute, Automated 0.02      Lymphocytes Absolute 2.37       Monocytes Absolute 0.64      Eosinophils Absolute 0.14      Basophils Absolute 0.02     COMPREHENSIVE METABOLIC PANEL - Abnormal    Glucose 119 (*)     Sodium 136      Potassium 3.8      Chloride 102      Bicarbonate 27      Anion Gap 11      Urea Nitrogen 10      Creatinine 0.90      eGFR 72      Calcium 9.4      Albumin 4.3      Alkaline Phosphatase 74      Total Protein 7.2      AST 12      Bilirubin, Total 1.2      ALT 7     URINALYSIS WITH REFLEX CULTURE AND MICROSCOPIC - Abnormal    Color, Urine Yellow      Appearance, Urine Clear      Specific Gravity, Urine 1.025      pH, Urine 5.5      Protein, Urine 30 (1+) (*)     Glucose, Urine Normal      Blood, Urine NEGATIVE      Ketones, Urine NEGATIVE      Bilirubin, Urine NEGATIVE      Urobilinogen, Urine 2 (1+) (*)     Nitrite, Urine NEGATIVE      Leukocyte Esterase, Urine NEGATIVE     URINALYSIS MICROSCOPIC WITH REFLEX CULTURE - Abnormal    WBC, Urine NONE      RBC, Urine 1-2      Squamous Epithelial Cells, Urine 1-9 (SPARSE)      Mucus, Urine 1+      Hyaline Casts, Urine 1+ (*)    STOOL PATHOGEN PANEL, PCR - Normal    Campylobacter Group Not Detected      Salmonella species Not Detected      Shigella species Not Detected      Vibrio Group Not Detected      Yersinia Enterocolitica Not Detected      Shiga Toxin 1 Not Detected      Shiga Toxin 2 Not Detected      Norovirus GI/GII Not Detected      Rotavirus A Not Detected     LIPASE - Normal    Lipase 25      Narrative:     Venipuncture immediately after or during the administration of Metamizole may lead to falsely low results. Testing should be performed immediately prior to Metamizole dosing.   LACTATE - Normal    Lactate 1.0      Narrative:     Venipuncture immediately after or during the administration of Metamizole may lead to falsely low results. Testing should be performed immediately  prior to Metamizole dosing.   AMYLASE - Normal    Amylase 43     TROPONIN I, HIGH SENSITIVITY - Normal    Troponin I, High  Sensitivity <3      Narrative:     Less than 99th percentile of normal range cutoff-  Female and children under 18 years old <14 ng/L; Male <21 ng/L: Negative  Repeat testing should be performed if clinically indicated.     Female and children under 18 years old 14-50 ng/L; Male 21-50 ng/L:  Consistent with possible cardiac damage and possible increased clinical   risk. Serial measurements may help to assess extent of myocardial damage.     >50 ng/L: Consistent with cardiac damage, increased clinical risk and  myocardial infarction. Serial measurements may help assess extent of   myocardial damage.      NOTE: Children less than 1 year old may have higher baseline troponin   levels and results should be interpreted in conjunction with the overall   clinical context.     NOTE: Troponin I testing is performed using a different   testing methodology at [facility] than at other   [facility]. Direct result comparisons should only   be made within the same method.   URINALYSIS WITH REFLEX CULTURE AND MICROSCOPIC    Narrative:     The following orders were created for panel order Urinalysis with Reflex Culture and Microscopic.  Procedure                               Abnormality         Status                     ---------                               -----------         ------                     Urinalysis with Reflex C...[[id number]]  Abnormal            Final result               Extra Urine Gray Tube[[id number]]                            Final result                 Please view results for these tests on the individual orders.   EXTRA URINE GRAY TUBE    Extra Tube Hold for add-ons.          US gallbladder   Final Result   Unremarkable ultrasound of the right upper quadrant.        MACRO:   None.        Signed by: Roman Rowe 9/4/2024 7:27 AM   Dictation workstation:   [id number]      CT abdomen pelvis w IV contrast   Final Result   1. Acute sigmoid diverticulitis. No shane perforation or  abscess   formation at this time.             Signed by: Kobe Garcia 9/4/2024 6:47 AM   Dictation workstation:   [id number]             Procedure  Procedures     Yuliana Rodriguez MD  11/13/24 2124

## 2024-09-04 NOTE — PROGRESS NOTES
"Emergency Medicine Transition of Care Note.    I received Rneetta Waldron in signout from Dr. Rodriguez.  Please see the previous ED provider note for all HPI, PE and MDM up to the time of signout at 0700. This is in addition to the primary record.    In brief Renetta Waldron is an 64 y.o. female presenting for   Chief Complaint   Patient presents with    Abdominal Pain     Upper right abdominal pain \"that goes into my rectum\" It's been going on for about 6 months. Pt brought her own stool sample.      At the time of signout we were awaiting: ct scan    Diagnoses as of 09/04/24 0920   Right upper quadrant abdominal pain       Medical Decision Making      Final diagnoses:   [R10.11] Right upper quadrant abdominal pain     64-year-old female presents with right-sided abdominal pain that goes from her right upper quadrant down to her rectum.  She states is been going on for 10 years.  She states that she has not had it worked up many times.  She states that she has had EGDs and colonoscopies.  She states that they tell her that it is diverticulosis.  She states that she has had HIDA scans which are normal.  She states that she started to have some stools that were looser.  She states that it has been going on since 2021.  She denies any fevers or chills.  She denies chest pain.    EKG was obtained which is interpreted by me shows a sinus rhythm rate of 68 without evidence of obvious ST elevations or T wave inversions to indicate acute ischemia or infarct.    My evaluation abdomen is benign.  She does not have any point tenderness to the abdomen.  Ultrasound was unremarkable per radiology.  CT scan did not show any acute findings.  She brought her own stool sample from home.  We did send it to the lab.  I did order C. difficile as well as PCR studies.  I will give her a referral to general surgery as she states that she is concerned that it is her gallbladder.  She does not appear septic or toxic.  She does not have an " acute abdomen.  Troponin is negative.  Lactate is normal.  She does not have a leukocytosis with a white count of 8.6.  She does not have an anemia with a hemoglobin of 15.6.  She has a normal lactate.  She has a normal lipase.    Labs Reviewed   CBC WITH AUTO DIFFERENTIAL - Abnormal       Result Value    WBC 8.6      nRBC 0.0      RBC 5.51 (*)     Hemoglobin 15.6      Hematocrit 47.3 (*)     MCV 86      MCH 28.3      MCHC 33.0      RDW 14.3      Platelets 249      Neutrophils % 63.2      Immature Granulocytes %, Automated 0.2      Lymphocytes % 27.4      Monocytes % 7.4      Eosinophils % 1.6      Basophils % 0.2      Neutrophils Absolute 5.45      Immature Granulocytes Absolute, Automated 0.02      Lymphocytes Absolute 2.37      Monocytes Absolute 0.64      Eosinophils Absolute 0.14      Basophils Absolute 0.02     COMPREHENSIVE METABOLIC PANEL - Abnormal    Glucose 119 (*)     Sodium 136      Potassium 3.8      Chloride 102      Bicarbonate 27      Anion Gap 11      Urea Nitrogen 10      Creatinine 0.90      eGFR 72      Calcium 9.4      Albumin 4.3      Alkaline Phosphatase 74      Total Protein 7.2      AST 12      Bilirubin, Total 1.2      ALT 7     URINALYSIS WITH REFLEX CULTURE AND MICROSCOPIC - Abnormal    Color, Urine Yellow      Appearance, Urine Clear      Specific Gravity, Urine 1.025      pH, Urine 5.5      Protein, Urine 30 (1+) (*)     Glucose, Urine Normal      Blood, Urine NEGATIVE      Ketones, Urine NEGATIVE      Bilirubin, Urine NEGATIVE      Urobilinogen, Urine 2 (1+) (*)     Nitrite, Urine NEGATIVE      Leukocyte Esterase, Urine NEGATIVE     URINALYSIS MICROSCOPIC WITH REFLEX CULTURE - Abnormal    WBC, Urine NONE      RBC, Urine 1-2      Squamous Epithelial Cells, Urine 1-9 (SPARSE)      Mucus, Urine 1+      Hyaline Casts, Urine 1+ (*)    LIPASE - Normal    Lipase 25      Narrative:     Venipuncture immediately after or during the administration of Metamizole may lead to falsely low results.  Testing should be performed immediately prior to Metamizole dosing.   LACTATE - Normal    Lactate 1.0      Narrative:     Venipuncture immediately after or during the administration of Metamizole may lead to falsely low results. Testing should be performed immediately  prior to Metamizole dosing.   AMYLASE - Normal    Amylase 43     TROPONIN I, HIGH SENSITIVITY - Normal    Troponin I, High Sensitivity <3      Narrative:     Less than 99th percentile of normal range cutoff-  Female and children under 18 years old <14 ng/L; Male <21 ng/L: Negative  Repeat testing should be performed if clinically indicated.     Female and children under 18 years old 14-50 ng/L; Male 21-50 ng/L:  Consistent with possible cardiac damage and possible increased clinical   risk. Serial measurements may help to assess extent of myocardial damage.     >50 ng/L: Consistent with cardiac damage, increased clinical risk and  myocardial infarction. Serial measurements may help assess extent of   myocardial damage.      NOTE: Children less than 1 year old may have higher baseline troponin   levels and results should be interpreted in conjunction with the overall   clinical context.     NOTE: Troponin I testing is performed using a different   testing methodology at [facility] than at other   [facility]. Direct result comparisons should only   be made within the same method.   STOOL PATHOGEN PANEL, PCR   URINALYSIS WITH REFLEX CULTURE AND MICROSCOPIC    Narrative:     The following orders were created for panel order Urinalysis with Reflex Culture and Microscopic.  Procedure                               Abnormality         Status                     ---------                               -----------         ------                     Urinalysis with Reflex C...[[id number]]  Abnormal            Final result               Extra Urine Gray Tube[[id number]]                            In process                   Please view results  for these tests on the individual orders.   EXTRA URINE GRAY TUBE        US gallbladder   Final Result   Unremarkable ultrasound of the right upper quadrant.        MACRO:   None.        Signed by: Roman Rowe 9/4/2024 7:27 AM   Dictation workstation:   [id number]      CT abdomen pelvis w IV contrast   Final Result   1. Acute sigmoid diverticulitis. No shane perforation or abscess   formation at this time.             Signed by: Kobe Garcia 9/4/2024 6:47 AM   Dictation workstation:   [id number]           Procedure  Procedures    Ethel Rojo MD

## 2024-09-07 ENCOUNTER — ANESTHESIA EVENT (OUTPATIENT)
Dept: OPERATING ROOM | Facility: HOSPITAL | Age: 64
End: 2024-09-07
Payer: OTHER GOVERNMENT

## 2024-09-09 ENCOUNTER — ANESTHESIA (OUTPATIENT)
Dept: OPERATING ROOM | Facility: HOSPITAL | Age: 64
End: 2024-09-09
Payer: OTHER GOVERNMENT

## 2024-09-09 ENCOUNTER — HOSPITAL ENCOUNTER (OUTPATIENT)
Facility: HOSPITAL | Age: 64
LOS: 1 days | Discharge: HOME | End: 2024-09-11
Attending: DENTIST | Admitting: DENTIST
Payer: OTHER GOVERNMENT

## 2024-09-09 DIAGNOSIS — K08.20: Primary | ICD-10-CM

## 2024-09-09 DIAGNOSIS — M86.9 OSTEOMYELITIS, UNSPECIFIED SITE, UNSPECIFIED TYPE (MULTI): ICD-10-CM

## 2024-09-09 LAB
ABO GROUP (TYPE) IN BLOOD: NORMAL
ANTIBODY SCREEN: NORMAL
RH FACTOR (ANTIGEN D): NORMAL

## 2024-09-09 PROCEDURE — 2500000005 HC RX 250 GENERAL PHARMACY W/O HCPCS

## 2024-09-09 PROCEDURE — 2780000003 HC OR 278 NO HCPCS: Performed by: DENTIST

## 2024-09-09 PROCEDURE — C1713 ANCHOR/SCREW BN/BN,TIS/BN: HCPCS | Performed by: DENTIST

## 2024-09-09 PROCEDURE — 2500000004 HC RX 250 GENERAL PHARMACY W/ HCPCS (ALT 636 FOR OP/ED)

## 2024-09-09 PROCEDURE — 2500000001 HC RX 250 WO HCPCS SELF ADMINISTERED DRUGS (ALT 637 FOR MEDICARE OP)

## 2024-09-09 PROCEDURE — 2500000005 HC RX 250 GENERAL PHARMACY W/O HCPCS: Performed by: DENTIST

## 2024-09-09 PROCEDURE — 3700000001 HC GENERAL ANESTHESIA TIME - INITIAL BASE CHARGE: Performed by: DENTIST

## 2024-09-09 PROCEDURE — 3600000003 HC OR TIME - INITIAL BASE CHARGE - PROCEDURE LEVEL THREE: Performed by: DENTIST

## 2024-09-09 PROCEDURE — 3600000008 HC OR TIME - EACH INCREMENTAL 1 MINUTE - PROCEDURE LEVEL THREE: Performed by: DENTIST

## 2024-09-09 PROCEDURE — 2720000007 HC OR 272 NO HCPCS: Performed by: DENTIST

## 2024-09-09 PROCEDURE — 86901 BLOOD TYPING SEROLOGIC RH(D): CPT

## 2024-09-09 PROCEDURE — 2500000001 HC RX 250 WO HCPCS SELF ADMINISTERED DRUGS (ALT 637 FOR MEDICARE OP): Performed by: DENTIST

## 2024-09-09 PROCEDURE — 1100000001 HC PRIVATE ROOM DAILY

## 2024-09-09 PROCEDURE — 2500000004 HC RX 250 GENERAL PHARMACY W/ HCPCS (ALT 636 FOR OP/ED): Mod: JZ

## 2024-09-09 PROCEDURE — 36415 COLL VENOUS BLD VENIPUNCTURE: CPT

## 2024-09-09 PROCEDURE — 3700000002 HC GENERAL ANESTHESIA TIME - EACH INCREMENTAL 1 MINUTE: Performed by: DENTIST

## 2024-09-09 PROCEDURE — C1821 INTERSPINOUS IMPLANT: HCPCS | Performed by: DENTIST

## 2024-09-09 PROCEDURE — 7100000002 HC RECOVERY ROOM TIME - EACH INCREMENTAL 1 MINUTE: Performed by: DENTIST

## 2024-09-09 PROCEDURE — 7100000001 HC RECOVERY ROOM TIME - INITIAL BASE CHARGE: Performed by: DENTIST

## 2024-09-09 DEVICE — IMPLANTABLE DEVICE: Type: IMPLANTABLE DEVICE | Site: MANDIBLE | Status: FUNCTIONAL

## 2024-09-09 DEVICE — BONE GRAFT KIT 7510200 INFUSE SMALL
Type: IMPLANTABLE DEVICE | Site: MANDIBLE | Status: FUNCTIONAL
Brand: INFUSE® BONE GRAFT

## 2024-09-09 RX ORDER — HYDROMORPHONE HYDROCHLORIDE 1 MG/ML
INJECTION, SOLUTION INTRAMUSCULAR; INTRAVENOUS; SUBCUTANEOUS AS NEEDED
Status: DISCONTINUED | OUTPATIENT
Start: 2024-09-09 | End: 2024-09-09

## 2024-09-09 RX ORDER — NALOXONE HYDROCHLORIDE 0.4 MG/ML
0.2 INJECTION, SOLUTION INTRAMUSCULAR; INTRAVENOUS; SUBCUTANEOUS EVERY 5 MIN PRN
Status: DISCONTINUED | OUTPATIENT
Start: 2024-09-09 | End: 2024-09-09 | Stop reason: SDUPTHER

## 2024-09-09 RX ORDER — SODIUM CHLORIDE, SODIUM LACTATE, POTASSIUM CHLORIDE, CALCIUM CHLORIDE 600; 310; 30; 20 MG/100ML; MG/100ML; MG/100ML; MG/100ML
100 INJECTION, SOLUTION INTRAVENOUS CONTINUOUS
Status: DISCONTINUED | OUTPATIENT
Start: 2024-09-09 | End: 2024-09-09 | Stop reason: HOSPADM

## 2024-09-09 RX ORDER — ACETAMINOPHEN 325 MG/1
650 TABLET ORAL EVERY 6 HOURS
Status: DISCONTINUED | OUTPATIENT
Start: 2024-09-09 | End: 2024-09-11 | Stop reason: HOSPADM

## 2024-09-09 RX ORDER — ALBUTEROL SULFATE 0.83 MG/ML
2.5 SOLUTION RESPIRATORY (INHALATION) ONCE AS NEEDED
Status: DISCONTINUED | OUTPATIENT
Start: 2024-09-09 | End: 2024-09-09

## 2024-09-09 RX ORDER — ESMOLOL HYDROCHLORIDE 10 MG/ML
INJECTION INTRAVENOUS AS NEEDED
Status: DISCONTINUED | OUTPATIENT
Start: 2024-09-09 | End: 2024-09-09

## 2024-09-09 RX ORDER — LIDOCAINE HYDROCHLORIDE AND EPINEPHRINE 10; 10 MG/ML; UG/ML
INJECTION, SOLUTION INFILTRATION; PERINEURAL AS NEEDED
Status: DISCONTINUED | OUTPATIENT
Start: 2024-09-09 | End: 2024-09-09 | Stop reason: HOSPADM

## 2024-09-09 RX ORDER — PROPOFOL 10 MG/ML
INJECTION, EMULSION INTRAVENOUS AS NEEDED
Status: DISCONTINUED | OUTPATIENT
Start: 2024-09-09 | End: 2024-09-09

## 2024-09-09 RX ORDER — ENOXAPARIN SODIUM 100 MG/ML
40 INJECTION SUBCUTANEOUS
Status: DISCONTINUED | OUTPATIENT
Start: 2024-09-10 | End: 2024-09-11 | Stop reason: HOSPADM

## 2024-09-09 RX ORDER — HYDROMORPHONE HYDROCHLORIDE 1 MG/ML
0.5 INJECTION, SOLUTION INTRAMUSCULAR; INTRAVENOUS; SUBCUTANEOUS EVERY 5 MIN PRN
Status: DISCONTINUED | OUTPATIENT
Start: 2024-09-09 | End: 2024-09-09

## 2024-09-09 RX ORDER — BACITRACIN 500 [USP'U]/G
OINTMENT TOPICAL AS NEEDED
Status: DISCONTINUED | OUTPATIENT
Start: 2024-09-09 | End: 2024-09-09 | Stop reason: HOSPADM

## 2024-09-09 RX ORDER — NALOXONE HYDROCHLORIDE 0.4 MG/ML
0.2 INJECTION, SOLUTION INTRAMUSCULAR; INTRAVENOUS; SUBCUTANEOUS EVERY 5 MIN PRN
Status: DISCONTINUED | OUTPATIENT
Start: 2024-09-09 | End: 2024-09-11 | Stop reason: HOSPADM

## 2024-09-09 RX ORDER — IBUPROFEN 600 MG/1
600 TABLET ORAL 3 TIMES DAILY
Status: DISCONTINUED | OUTPATIENT
Start: 2024-09-09 | End: 2024-09-09 | Stop reason: SDUPTHER

## 2024-09-09 RX ORDER — LABETALOL HYDROCHLORIDE 5 MG/ML
INJECTION, SOLUTION INTRAVENOUS AS NEEDED
Status: DISCONTINUED | OUTPATIENT
Start: 2024-09-09 | End: 2024-09-09

## 2024-09-09 RX ORDER — HYDROMORPHONE HYDROCHLORIDE 1 MG/ML
0.1 INJECTION, SOLUTION INTRAMUSCULAR; INTRAVENOUS; SUBCUTANEOUS EVERY 5 MIN PRN
Status: DISCONTINUED | OUTPATIENT
Start: 2024-09-09 | End: 2024-09-09

## 2024-09-09 RX ORDER — CLINDAMYCIN PHOSPHATE 600 MG/50ML
INJECTION, SOLUTION INTRAVENOUS AS NEEDED
Status: DISCONTINUED | OUTPATIENT
Start: 2024-09-09 | End: 2024-09-09

## 2024-09-09 RX ORDER — HYDRALAZINE HYDROCHLORIDE 20 MG/ML
5 INJECTION INTRAMUSCULAR; INTRAVENOUS EVERY 30 MIN PRN
Status: DISCONTINUED | OUTPATIENT
Start: 2024-09-09 | End: 2024-09-09

## 2024-09-09 RX ORDER — FENTANYL CITRATE 50 UG/ML
INJECTION, SOLUTION INTRAMUSCULAR; INTRAVENOUS AS NEEDED
Status: DISCONTINUED | OUTPATIENT
Start: 2024-09-09 | End: 2024-09-09

## 2024-09-09 RX ORDER — ACETAMINOPHEN 325 MG/1
650 TABLET ORAL EVERY 4 HOURS PRN
Status: DISCONTINUED | OUTPATIENT
Start: 2024-09-09 | End: 2024-09-09 | Stop reason: SDUPTHER

## 2024-09-09 RX ORDER — CLINDAMYCIN PHOSPHATE 600 MG/50ML
600 INJECTION, SOLUTION INTRAVENOUS EVERY 8 HOURS SCHEDULED
Status: DISCONTINUED | OUTPATIENT
Start: 2024-09-09 | End: 2024-09-11 | Stop reason: HOSPADM

## 2024-09-09 RX ORDER — DEXAMETHASONE SODIUM PHOSPHATE 10 MG/ML
INJECTION INTRAMUSCULAR; INTRAVENOUS AS NEEDED
Status: DISCONTINUED | OUTPATIENT
Start: 2024-09-09 | End: 2024-09-09

## 2024-09-09 RX ORDER — ACETAMINOPHEN 160 MG/5ML
650 SOLUTION ORAL EVERY 6 HOURS
Status: DISCONTINUED | OUTPATIENT
Start: 2024-09-09 | End: 2024-09-11 | Stop reason: HOSPADM

## 2024-09-09 RX ORDER — SODIUM CHLORIDE 0.9 G/100ML
IRRIGANT IRRIGATION AS NEEDED
Status: DISCONTINUED | OUTPATIENT
Start: 2024-09-09 | End: 2024-09-09 | Stop reason: HOSPADM

## 2024-09-09 RX ORDER — HYDROMORPHONE HYDROCHLORIDE 1 MG/ML
0.2 INJECTION, SOLUTION INTRAMUSCULAR; INTRAVENOUS; SUBCUTANEOUS EVERY 5 MIN PRN
Status: DISCONTINUED | OUTPATIENT
Start: 2024-09-09 | End: 2024-09-09

## 2024-09-09 RX ORDER — LIDOCAINE HYDROCHLORIDE 20 MG/ML
INJECTION, SOLUTION INFILTRATION; PERINEURAL AS NEEDED
Status: DISCONTINUED | OUTPATIENT
Start: 2024-09-09 | End: 2024-09-09

## 2024-09-09 RX ORDER — ONDANSETRON HYDROCHLORIDE 2 MG/ML
INJECTION, SOLUTION INTRAVENOUS AS NEEDED
Status: DISCONTINUED | OUTPATIENT
Start: 2024-09-09 | End: 2024-09-09

## 2024-09-09 RX ORDER — ROCURONIUM BROMIDE 10 MG/ML
INJECTION, SOLUTION INTRAVENOUS AS NEEDED
Status: DISCONTINUED | OUTPATIENT
Start: 2024-09-09 | End: 2024-09-09

## 2024-09-09 RX ORDER — CHLORHEXIDINE GLUCONATE ORAL RINSE 1.2 MG/ML
15 SOLUTION DENTAL 2 TIMES DAILY
Status: DISCONTINUED | OUTPATIENT
Start: 2024-09-09 | End: 2024-09-11 | Stop reason: HOSPADM

## 2024-09-09 RX ORDER — ONDANSETRON HYDROCHLORIDE 2 MG/ML
4 INJECTION, SOLUTION INTRAVENOUS ONCE AS NEEDED
Status: DISCONTINUED | OUTPATIENT
Start: 2024-09-09 | End: 2024-09-09

## 2024-09-09 RX ORDER — ENOXAPARIN SODIUM 100 MG/ML
30 INJECTION SUBCUTANEOUS EVERY 12 HOURS
Status: DISCONTINUED | OUTPATIENT
Start: 2024-09-09 | End: 2024-09-09

## 2024-09-09 RX ORDER — LABETALOL HYDROCHLORIDE 5 MG/ML
5 INJECTION, SOLUTION INTRAVENOUS ONCE AS NEEDED
Status: DISCONTINUED | OUTPATIENT
Start: 2024-09-09 | End: 2024-09-09

## 2024-09-09 RX ORDER — TRIPROLIDINE/PSEUDOEPHEDRINE 2.5MG-60MG
600 TABLET ORAL 3 TIMES DAILY
Status: DISCONTINUED | OUTPATIENT
Start: 2024-09-09 | End: 2024-09-09 | Stop reason: SDUPTHER

## 2024-09-09 RX ORDER — KETOROLAC TROMETHAMINE 30 MG/ML
30 INJECTION, SOLUTION INTRAMUSCULAR; INTRAVENOUS EVERY 6 HOURS PRN
Status: DISCONTINUED | OUTPATIENT
Start: 2024-09-09 | End: 2024-09-11 | Stop reason: HOSPADM

## 2024-09-09 SDOH — SOCIAL STABILITY: SOCIAL INSECURITY: HAS ANYONE EVER THREATENED TO HURT YOUR FAMILY OR YOUR PETS?: NO

## 2024-09-09 SDOH — SOCIAL STABILITY: SOCIAL INSECURITY: ARE THERE ANY APPARENT SIGNS OF INJURIES/BEHAVIORS THAT COULD BE RELATED TO ABUSE/NEGLECT?: NO

## 2024-09-09 SDOH — SOCIAL STABILITY: SOCIAL INSECURITY: ARE YOU OR HAVE YOU BEEN THREATENED OR ABUSED PHYSICALLY, EMOTIONALLY, OR SEXUALLY BY ANYONE?: NO

## 2024-09-09 SDOH — SOCIAL STABILITY: SOCIAL INSECURITY: DOES ANYONE TRY TO KEEP YOU FROM HAVING/CONTACTING OTHER FRIENDS OR DOING THINGS OUTSIDE YOUR HOME?: NO

## 2024-09-09 SDOH — SOCIAL STABILITY: SOCIAL INSECURITY: WERE YOU ABLE TO COMPLETE ALL THE BEHAVIORAL HEALTH SCREENINGS?: YES

## 2024-09-09 SDOH — SOCIAL STABILITY: SOCIAL INSECURITY: DO YOU FEEL UNSAFE GOING BACK TO THE PLACE WHERE YOU ARE LIVING?: NO

## 2024-09-09 SDOH — SOCIAL STABILITY: SOCIAL INSECURITY: DO YOU FEEL ANYONE HAS EXPLOITED OR TAKEN ADVANTAGE OF YOU FINANCIALLY OR OF YOUR PERSONAL PROPERTY?: NO

## 2024-09-09 SDOH — SOCIAL STABILITY: SOCIAL INSECURITY: HAVE YOU HAD THOUGHTS OF HARMING ANYONE ELSE?: NO

## 2024-09-09 SDOH — SOCIAL STABILITY: SOCIAL INSECURITY: HAVE YOU HAD ANY THOUGHTS OF HARMING ANYONE ELSE?: NO

## 2024-09-09 SDOH — SOCIAL STABILITY: SOCIAL INSECURITY: ABUSE: ADULT

## 2024-09-09 ASSESSMENT — PAIN - FUNCTIONAL ASSESSMENT
PAIN_FUNCTIONAL_ASSESSMENT: 0-10

## 2024-09-09 ASSESSMENT — ACTIVITIES OF DAILY LIVING (ADL)
PATIENT'S MEMORY ADEQUATE TO SAFELY COMPLETE DAILY ACTIVITIES?: YES
HEARING - LEFT EAR: FUNCTIONAL
ADEQUATE_TO_COMPLETE_ADL: YES
JUDGMENT_ADEQUATE_SAFELY_COMPLETE_DAILY_ACTIVITIES: YES
LACK_OF_TRANSPORTATION: NO
HEARING - RIGHT EAR: FUNCTIONAL
WALKS IN HOME: INDEPENDENT
DRESSING YOURSELF: INDEPENDENT
FEEDING YOURSELF: INDEPENDENT
BATHING: INDEPENDENT
GROOMING: INDEPENDENT
TOILETING: INDEPENDENT

## 2024-09-09 ASSESSMENT — ENCOUNTER SYMPTOMS
EYES NEGATIVE: 1
ENDOCRINE NEGATIVE: 1
ROS GI COMMENTS: IBS
CONSTITUTIONAL NEGATIVE: 1
MUSCULOSKELETAL NEGATIVE: 1
PSYCHIATRIC NEGATIVE: 1

## 2024-09-09 ASSESSMENT — PAIN SCALES - GENERAL
PAINLEVEL_OUTOF10: 10 - WORST POSSIBLE PAIN
PAINLEVEL_OUTOF10: 4
PAINLEVEL_OUTOF10: 0 - NO PAIN

## 2024-09-09 ASSESSMENT — COGNITIVE AND FUNCTIONAL STATUS - GENERAL
WALKING IN HOSPITAL ROOM: A LITTLE
DAILY ACTIVITIY SCORE: 24
MOBILITY SCORE: 22
PATIENT BASELINE BEDBOUND: NO
CLIMB 3 TO 5 STEPS WITH RAILING: A LITTLE

## 2024-09-09 ASSESSMENT — LIFESTYLE VARIABLES
AUDIT-C TOTAL SCORE: 0
AUDIT-C TOTAL SCORE: 0
HOW OFTEN DO YOU HAVE A DRINK CONTAINING ALCOHOL: NEVER
HOW MANY STANDARD DRINKS CONTAINING ALCOHOL DO YOU HAVE ON A TYPICAL DAY: PATIENT DOES NOT DRINK
HOW OFTEN DO YOU HAVE 6 OR MORE DRINKS ON ONE OCCASION: NEVER
SKIP TO QUESTIONS 9-10: 1

## 2024-09-09 ASSESSMENT — PATIENT HEALTH QUESTIONNAIRE - PHQ9
1. LITTLE INTEREST OR PLEASURE IN DOING THINGS: NOT AT ALL
2. FEELING DOWN, DEPRESSED OR HOPELESS: NOT AT ALL
SUM OF ALL RESPONSES TO PHQ9 QUESTIONS 1 & 2: 0

## 2024-09-09 NOTE — ANESTHESIA POSTPROCEDURE EVALUATION
Patient: Renetta Waldron    Procedure Summary       Date: 09/09/24 Room / Location: [facility] OR 06 / [facility] [place]    Anesthesia Start: 1304 Anesthesia Stop: 1543    Procedure: Reconstruction Mandible with Bone Graft Diagnosis:       Osteomyelitis of other site, unspecified type (Multi)      (Osteomyelitis of other site, unspecified type (CMS/[facility]) [M86.9])    Surgeons: Kane Salcedo DMD Responsible Provider: Kane Hargrove DO    Anesthesia Type: general ASA Status: 2            Anesthesia Type: general    Vitals Value Taken Time   /75 09/09/24 1539   Temp 36 °C (96.8 °F) 09/09/24 1538   Pulse 71 09/09/24 1542   Resp 13 09/09/24 1542   SpO2 100 % 09/09/24 1542   Vitals shown include unfiled device data.    Anesthesia Post Evaluation    Patient location during evaluation: PACU  Patient participation: complete - patient participated  Level of consciousness: awake and alert  Pain management: satisfactory to patient  Multimodal analgesia pain management approach  Airway patency: patent  Two or more strategies used to mitigate risk of obstructive sleep apnea  Cardiovascular status: blood pressure returned to baseline  Respiratory status: acceptable  Hydration status: acceptable  Postoperative Nausea and Vomiting: none    There were no known notable events for this encounter.

## 2024-09-09 NOTE — OP NOTE
Reconstruction Mandible with Bone Graft Operative Note     Date: 2024  OR Location: [facility] OR    Name: Renetta Waldron, : 1960, Age: 64 y.o., MRN: [id number], Sex: female    Diagnosis  Pre-op Diagnosis      * Osteomyelitis of other site, unspecified type (Multi) [M86.9] Post-op Diagnosis     * Osteomyelitis of other site, unspecified type (Multi) [M86.9]     Procedures  Reconstruction Mandible with Bone Graft   - WA GRAFT BONE MANDIBLE     - Deep hardware removal    Surgeons      * Kane Salcedo - Primary    Resident/Fellow/Other Assistant:  Surgeons and Role:     * Pa Roper DMD, MD - Resident - Assisting     * Nahun Monroy MD, DDS - Resident - Assisting  Pa Roper at the right hip harvest site  Nahun Monroy at the neck grafting site    Procedure Summary  Anesthesia: General  ASA: II  Anesthesia Staff: Anesthesiologist: Kane Hargrove DO; Sinai Bermeo MD  Anesthesia Resident: Norm Vasquez MD  Frontline Breaker: SANG Lin-CRNA  Estimated Blood Loss: 20mL  Intra-op Medications: * Intraprocedure medication information is unavailable because the case start and end events have not been set *           Anesthesia Record               Intraprocedure I/O Totals       None           Specimen: No specimens collected     Staff:   Relief Circulator: Nyla Flores Scrub: Dmitri  Circulator: Vickie  Scrub Person: Leora Saldanaub Person: Denise Flores Scrub: Dilip    Drains and/or Catheters: * None in log *    Tourniquet Times:         Implants:  Implants       Type Name Action Serial No.      Graft INFUSE BMP SMALL - SN/A - [id number] Implanted N/A     Screw SCREW, 2 X 16 CROSS PIN - SN/A - [id number] Implanted N/A              Findings: Atrophic anterior mandible    Indications: Renetta Walrdon is an 64 y.o. female who is having surgery for Osteomyelitis of other site, unspecified type (CMS/HCC) [M86.9].     The patient was seen in the preoperative area. The  risks, benefits, complications, treatment options, non-operative alternatives, expected recovery and outcomes were discussed with the patient. The possibilities of reaction to medication, pulmonary aspiration, injury to surrounding structures, bleeding, recurrent infection, the need for additional procedures, failure to diagnose a condition, and creating a complication requiring transfusion or operation were discussed with the patient. The patient concurred with the proposed plan, giving informed consent.  The site of surgery was properly noted/marked if necessary per policy. The patient has been actively warmed in preoperative area. Preoperative antibiotics have been ordered and given within 1 hours of incision. Venous thrombosis prophylaxis have been ordered including bilateral sequential compression devices and chemical prophylaxis    Procedure Details:   The patient was greeted in the pre-op holding area, where all preoperative risks and complications were reviewed. Later, the patient was brought to the operating room by the anesthesia staff and was placed in the supine position. A huddle was performed to confirmed patient's identity and the procedure to be performed. The patient was then induced and intubated. An oral endotracheal tube was placed and secured by the surgical team. The patient was prepped and draped in standard oral maxillofacial surgery fashion for a transcervical approach to the mandible and an anterior iliac crest hip graft.      A “time out” was performed. Incision marked with a surgical marking pen extending approximately 8 cm parallel to the inferior border of the anterior mandible, marked at 2 cm below the mandible. An incision was made using a 15 blade extending from the skin to the subcutaneous fat layer. The platysma was incised and a subplatysmal flap was elevated using a combination of blunt and sharp dissection. Marginal mandibular nerve was not visualized, there was no indication  that it was violated. Dissection proceeded to the inferior border of the mandible.  Once the anterior border of mandible was palpated and, the overlaying periosteum and tissue was incised with electrocautery. Subperiosteal dissection was carried out to expose the mandible and existing hardware. There were no perforations into the oral cavity.     The surgical team was split into two teams -- one team working at the neck for the placement of bone graft and the other team worked on harvesting the bone from the patient’s left anterior iliac crest.     The right anterior crest was palpated.  Landmarks were identified; the AIS and tuberosity. A 4cm incision was marked with surgical marker 2cm lateral to the anterior iliac crest, and 2cm from the AIS extending to the tuberosity.   Epinephrine 1:100,000 was administered for local anesthetic at the surgical site and a pause was done.  Medial traction was provided and a ~4cm long incision was made through dermis with a #10 blade starting 1cm posterior and superior to left anterior iliac spine and ascending ~4cm superiorly following the illiac crest. Electrocautery was then used to dissect through the fascia and the fat; bleeding vessels were cauterized as the dissection continued toward the iliac crest. The periosteum was incised and a periosteal elevator was then used to dissect subperiosteally and expose the medial aspect of the iliac crest.  The graft site was marked with bone marking pencil at the midline of the iliac crest starting 2.0cm posterior to AIS and extending 5cm superiorly along the crest. Vertical marks made medially on either side to outline a 5x2cm block. Reciprocating saw used to make the osteotomies along the marks. Small and large osteotomes were used to go through the outer cortex of bone. 5x2cm corticocancellous block harvested and placed in normal saline for later reconstruction of mandible. Cancellous bone was then harvested with curettes and placed  in normal saline. Copious irrigation with normal saline at surgical site. Avitene was utilized at the site of graft harvest for hemostasis. Incision site was closed in a layered interrupted fashion with 3-0 vicryl. Skin was closed with a running  subcutaneous suture using a single resorbable monofilament suture. Dermabond and island dressing was placed over incision site. The bone graft was then carefully shaped and fashioned for placement into the mandibular site defect. Pressure dressing placed over surgical site.     The mandibular deep hardware plate and screws were removed to allow for placement of the bone graft. The corticocancellous bone block was then carefully shaped and fashioned to fit into the segmental defect in the anterior mandible on the height of the remaining ridge and secured to the mandible with two bicortical screws from superior to inferior. The harvested cancellous bone was combined with BMP and packed around and on top of the block. Tisseel was then placed over the bone graft and BMP. The neck incision was then irrigated and closed in a layered fashion using 3-0 and 4-0 vicryl sutures to close the fascial layers, platysma. Dermis was closed with 4-0 vicryl sutures. Skin was closed with 5-0 resorbable sutures. Dermabond and steri strips placed over incision site followed by an island dressing. The patient was emerged from anesthesia, extubated and was taken to PACU in stable condition.    Dr. Salcedo was present for all critical portions of the case.     Complications:  None; patient tolerated the procedure well.    Disposition: PACU - hemodynamically stable.  Condition: stable     Additional Details: NA    Attending Attestation:     Kane Salcedo  Phone Number: [phone]

## 2024-09-09 NOTE — H&P
"History Of Present Illness  Renetta Waldron is a 64 y.o. female with PMH significant for COPD, pneumonia, chronic bronchitis and osteomyelitis             (December 2023)  presenting with bony defect of the anterior mandible s/p mandibular debridement and placement of a reconstruction plate. Patient's chief complaint is that \" her lower lip folds inwards and nobody can understand anything she's saying\".     Past Medical History  Past Medical History:   Diagnosis Date    COPD (chronic obstructive pulmonary disease) (Multi)        Surgical History  Past Surgical History:   Procedure Laterality Date    COLONOSCOPY          Social History  She reports that she has been smoking cigarettes. She started smoking about 15 years ago. She has a 7.5 pack-year smoking history. She has never used smokeless tobacco. She reports that she does not currently use alcohol. She reports that she does not use drugs.    Family History  No family history on file.     Allergies  Levonorgestrel-ethinyl estrad, Penicillins, Sulfa (sulfonamide antibiotics), Ciprofloxacin, Clavulanic acid, Azithromycin, Cephalexin, Esomeprazole, Levofloxacin, and Omeprazole    Review of Systems   Constitutional: Negative.    HENT: Negative.     Eyes: Negative.    Gastrointestinal:         IBS   Endocrine: Negative.    Musculoskeletal: Negative.    Skin: Negative.    Psychiatric/Behavioral: Negative.          Physical Exam  Constitutional:       Appearance: Normal appearance.   HENT:      Head:      Comments: Patient has no facial asymmetry  Negative TTP b/l  No b/l TMJ clicking/popping/TTP  ESTEVAN is 40+  CN VII is intact b/l  CN V1 an V2 is intact b/l  CN V3 is slightly hyperesthetic over the anterior chin area  Intraoral exam reveals severely atrophic anterior mandible  No active signs of infection  Eyes:      Pupils: Pupils are equal, round, and reactive to light.   Cardiovascular:      Comments: Warm and well-perfused  Pulmonary:      Comments: Breathing " "comfortably on room air  Abdominal:      General: Abdomen is flat. There is distension.      Palpations: Abdomen is soft.      Comments: Patient has IBS    Musculoskeletal:         General: Normal range of motion.      Cervical back: Normal range of motion.   Skin:     General: Skin is warm and dry.   Neurological:      Mental Status: She is alert and oriented to person, place, and time.   Psychiatric:         Mood and Affect: Mood normal.         Behavior: Behavior normal.          Last Recorded Vitals  Blood pressure 128/67, pulse 86, temperature 36.6 °C (97.9 °F), temperature source Temporal, resp. rate 14, height 1.676 m (5' 6\"), weight 70.7 kg (155 lb 13.8 oz), SpO2 96%.    Relevant Results    IMPRESSION:      Postoperative changes of the mandible with artifact from plate and  screw fixation extending across the symphysis. There is again  abnormal lucency and osseous destruction centered within the  symphysis and extending into the right parasymphyseal region with  erosive changes and loss of cortex. Assessment of the soft tissues is  limited due to artifact from the orthopedic hardware. There is soft  tissue thickening, fat stranding, and soft tissue emphysema within  the adjacent soft tissues. The findings are nonspecific but are  compatible with the given history of osteomyelitis. Associated soft  tissue phlegmon or developing abscess is not excluded.  Assessment/Plan   Assessment & Plan      Assessment/Plan:    Patient is a 64 year old female who presents regarding bony defect of the anterior mandible s/p mandibular debridement and placement of a reconstruction plate.     Plan:    Right anterior iliac crest graft (AICG) to mandible with possible placement of a reconstruction plate, and  possible maxillomandibular fixation in the OR.      Celina Monterroso DDS  [facility] [facility]Y-1    "

## 2024-09-09 NOTE — ANESTHESIA PROCEDURE NOTES
Airway  Date/Time: 9/9/2024 1:13 PM  Urgency: elective    Airway not difficult    Staffing  Performed: resident   Authorized by: Sinai Bermeo MD    Performed by: Norm Vasquez MD  Patient location during procedure: OR    Indications and Patient Condition  Indications for airway management: anesthesia and airway protection  Spontaneous ventilation: present  Sedation level: deep  Preoxygenated: yes  Patient position: sniffing  Mask difficulty assessment: 1 - vent by mask    Final Airway Details  Final airway type: endotracheal airway      Successful airway: ETT  Cuffed: yes   Successful intubation technique: direct laryngoscopy  Facilitating devices/methods: intubating stylet  Endotracheal tube insertion site: oral  Blade: Darrell  Blade size: #3  ETT size (mm): 7.0  Cormack-Lehane Classification: grade I - full view of glottis  Placement verified by: chest auscultation and capnometry   Measured from: lips  ETT to lips (cm): 22  Number of attempts at approach: 1

## 2024-09-10 LAB
ANION GAP SERPL CALC-SCNC: 11 MMOL/L (ref 10–20)
BUN SERPL-MCNC: 7 MG/DL (ref 6–23)
CALCIUM SERPL-MCNC: 8.9 MG/DL (ref 8.6–10.6)
CHLORIDE SERPL-SCNC: 103 MMOL/L (ref 98–107)
CO2 SERPL-SCNC: 31 MMOL/L (ref 21–32)
CREAT SERPL-MCNC: 0.76 MG/DL (ref 0.5–1.05)
EGFRCR SERPLBLD CKD-EPI 2021: 88 ML/MIN/1.73M*2
ERYTHROCYTE [DISTWIDTH] IN BLOOD BY AUTOMATED COUNT: 14.5 % (ref 11.5–14.5)
GLUCOSE SERPL-MCNC: 99 MG/DL (ref 74–99)
HCT VFR BLD AUTO: 38.9 % (ref 36–46)
HGB BLD-MCNC: 12.2 G/DL (ref 12–16)
MCH RBC QN AUTO: 27.9 PG (ref 26–34)
MCHC RBC AUTO-ENTMCNC: 31.4 G/DL (ref 32–36)
MCV RBC AUTO: 89 FL (ref 80–100)
NRBC BLD-RTO: 0 /100 WBCS (ref 0–0)
PLATELET # BLD AUTO: 261 X10*3/UL (ref 150–450)
POTASSIUM SERPL-SCNC: 4.6 MMOL/L (ref 3.5–5.3)
RBC # BLD AUTO: 4.38 X10*6/UL (ref 4–5.2)
SODIUM SERPL-SCNC: 140 MMOL/L (ref 136–145)
WBC # BLD AUTO: 11.5 X10*3/UL (ref 4.4–11.3)

## 2024-09-10 PROCEDURE — 2500000001 HC RX 250 WO HCPCS SELF ADMINISTERED DRUGS (ALT 637 FOR MEDICARE OP)

## 2024-09-10 PROCEDURE — 97162 PT EVAL MOD COMPLEX 30 MIN: CPT | Mod: GP

## 2024-09-10 PROCEDURE — 2500000004 HC RX 250 GENERAL PHARMACY W/ HCPCS (ALT 636 FOR OP/ED)

## 2024-09-10 PROCEDURE — 80048 BASIC METABOLIC PNL TOTAL CA: CPT

## 2024-09-10 PROCEDURE — 36415 COLL VENOUS BLD VENIPUNCTURE: CPT

## 2024-09-10 PROCEDURE — 7100000011 HC EXTENDED STAY RECOVERY HOURLY - NURSING UNIT

## 2024-09-10 PROCEDURE — 85027 COMPLETE CBC AUTOMATED: CPT

## 2024-09-10 RX ORDER — TRAMADOL HYDROCHLORIDE 50 MG/1
50 TABLET ORAL EVERY 6 HOURS PRN
Status: DISCONTINUED | OUTPATIENT
Start: 2024-09-10 | End: 2024-09-11 | Stop reason: HOSPADM

## 2024-09-10 RX ORDER — OXYCODONE HYDROCHLORIDE 5 MG/1
5 TABLET ORAL EVERY 6 HOURS PRN
Status: DISCONTINUED | OUTPATIENT
Start: 2024-09-10 | End: 2024-09-11 | Stop reason: HOSPADM

## 2024-09-10 ASSESSMENT — PAIN SCALES - GENERAL
PAINLEVEL_OUTOF10: 4
PAINLEVEL_OUTOF10: 10 - WORST POSSIBLE PAIN
PAINLEVEL_OUTOF10: 8
PAINLEVEL_OUTOF10: 0 - NO PAIN
PAINLEVEL_OUTOF10: 7
PAINLEVEL_OUTOF10: 3
PAINLEVEL_OUTOF10: 7

## 2024-09-10 ASSESSMENT — COGNITIVE AND FUNCTIONAL STATUS - GENERAL
STANDING UP FROM CHAIR USING ARMS: A LITTLE
MOVING TO AND FROM BED TO CHAIR: A LITTLE
TURNING FROM BACK TO SIDE WHILE IN FLAT BAD: A LITTLE
MOBILITY SCORE: 18
MOVING FROM LYING ON BACK TO SITTING ON SIDE OF FLAT BED WITH BEDRAILS: A LITTLE
WALKING IN HOSPITAL ROOM: A LITTLE
CLIMB 3 TO 5 STEPS WITH RAILING: A LITTLE

## 2024-09-10 ASSESSMENT — PAIN - FUNCTIONAL ASSESSMENT
PAIN_FUNCTIONAL_ASSESSMENT: 0-10

## 2024-09-10 ASSESSMENT — ACTIVITIES OF DAILY LIVING (ADL): ADL_ASSISTANCE: INDEPENDENT

## 2024-09-10 NOTE — CARE PLAN
Problem: Pain - Adult  Goal: Verbalizes/displays adequate comfort level or baseline comfort level  Outcome: Progressing     Problem: Safety - Adult  Goal: Free from fall injury  Outcome: Progressing     Problem: Chronic Conditions and Co-morbidities  Goal: Patient's chronic conditions and co-morbidity symptoms are monitored and maintained or improved  Outcome: Progressing   The patient's goals for the shift include      The clinical goals for the shift include Pt will remain safe and pain controlled during night.

## 2024-09-10 NOTE — PROGRESS NOTES
TCC admission assessment completed. Explained role of TCC - verbalized understanding.     Demographics/Insurance: Confirmed in chart.   Living Environment: Independent in ADLs. Lives alone.  Primary Support Person: Daughter, Leia, [phone]  PCP: Dr. Valerie Gardner  Recent Falls: Denies  Assistive Devices/DME: Denies  Home Oxygen: Denies  Dialysis: Denies  Home Care Agency: Denies  Transportation at Discharge: Family will transport home.   [facility]: [facility]  Concerns about discharge: None at this time.    Leai Valadez RN, TCC

## 2024-09-10 NOTE — CARE PLAN
The patient's goals for the shift include      The clinical goals for the shift include Pt will remain safe and pain controlled during night.    Pt remained safe and free of injury during night. Pain on right groin rated high and toradol and oxycodone administered. Walked to the bathroom several times. No other distress noted. Call light in reach. Resting quietly at this time.     Elenita Escobar RN

## 2024-09-10 NOTE — PROGRESS NOTES
Physical Therapy    Physical Therapy Evaluation    Patient Name: Renetta Waldron  MRN: [id number]  Today's Date: 9/10/2024   Time Calculation  Start Time: 0917  Stop Time: 0931  Time Calculation (min): 14 min    Assessment/Plan   PT Assessment  Medical Staff Made Aware: Yes  End of Session Communication: Bedside nurse  Assessment Comment: Patient is a 63yo F  s/p AICG to anterior mandible and removal of reconstruction plate. Patient is indep at baseline. Patient reports pain with movement of R hip but does not limit ambulation. No further PT needs  End of Session Patient Position: Bed, 3 rail up, Alarm on  IP OR SWING BED PT PLAN  Inpatient or Swing Bed: Inpatient  PT Plan  PT Plan: PT Eval only  PT Eval Only Reason: At baseline function  PT Frequency: PT eval only  PT Discharge Recommendations: No further acute PT  PT - OK to Discharge: Yes      Subjective   General Visit Information:  General  Reason for Referral: s/p AICG to anterior mandible and removal of reconstruction plate.  Past Medical History Relevant to Rehab: COPD, pneumonia, chronic bronchitis and osteomyelitis  Prior to Session Communication: Bedside nurse  Patient Position Received: Bed, 3 rail up, Alarm on  General Comment: pt supine in bed, agreeable to PT. RN cleared  Home Living:  Home Living  Type of Home: Apartment  Lives With: Alone  Home Adaptive Equipment: None  Home Layout: Bed/bath upstairs (pt reports she has been staying on couch on first floor)  Home Access: Stairs to enter with rails  Entrance Stairs-Rails:  (1 HR)  Entrance Stairs-Number of Steps: 2  Bathroom Shower/Tub: Tub/shower unit  Prior Level of Function:  Prior Function Per Pt/Caregiver Report  Level of [place]: Independent with ADLs and functional transfers, Independent with homemaking with ambulation  ADL Assistance: Independent  Homemaking Assistance: Independent  Ambulatory Assistance: Independent  Vocational: Full time employment  Prior Function Comments: + drive. -  "falls  Precautions:  Precautions  Medical Precautions: Fall precautions    Vital Signs Comment: Spo2: 90-92%     Objective   Pain:  Pain Assessment  Pain Assessment: 0-10  0-10 (Numeric) Pain Score:  (denies pain at rest, but worsens with transitional movements to \"over 10\")  Pain Type: Surgical pain, Acute pain  Pain Location: Groin  Response to Interventions: comfortable in bed  Cognition:  Cognition  Overall Cognitive Status: Within Functional Limits  Orientation Level: Oriented X4    General Assessments:        Activity Tolerance  Endurance: Endurance does not limit participation in activity    Sensation  Light Touch:  (numbness in chin)       Functional Assessments:  Bed Mobility  Bed Mobility: Yes  Bed Mobility 1  Bed Mobility 1: Supine to sitting  Level of Assistance 1:  (SBA)  Bed Mobility Comments 1: HOB elevated    Transfers  Transfer: Yes  Transfer 1  Transfer From 1: Sit to, Stand to  Transfer to 1: Stand, Sit  Technique 1: Sit to stand, Stand to sit  Transfer Level of Assistance 1:  (SBA)  Trials/Comments 1: stood from EOB    Ambulation/Gait Training  Ambulation/Gait Training Performed: Yes  Ambulation/Gait Training 1  Surface 1: Level tile  Device 1: No device  Assistance 1: Close supervision  Quality of Gait 1: Decreased step length, Antalgic  Comments/Distance (ft) 1: ambulated in room and hallway 40' x2  Extremity/Trunk Assessments:  RLE   RLE : Within Functional Limits  LLE   LLE : Within Functional Limits  Outcome Measures:  [facility] Basic Mobility  Turning from your back to your side while in a flat bed without using bedrails: A little  Moving from lying on your back to sitting on the side of a flat bed without using bedrails: A little  Moving to and from bed to chair (including a wheelchair): A little  Standing up from a chair using your arms (e.g. wheelchair or bedside chair): A little  To walk in hospital room: A little  Climbing 3-5 steps with railing: A little  Basic Mobility - Total Score: " 18    Encounter Problems       Encounter Problems (Active)       Pain - Adult              Education Documentation  Precautions, taught by Leora Burton PT at 9/10/2024 10:48 AM.  Learner: Patient  Readiness: Acceptance  Method: Explanation  Response: Verbalizes Understanding  Comment: edu on role of PT, dc plan and safety    Mobility Training, taught by Leora Burton PT at 9/10/2024 10:48 AM.  Learner: Patient  Readiness: Acceptance  Method: Explanation  Response: Verbalizes Understanding  Comment: edu on role of PT, dc plan and safety    Education Comments  No comments found.

## 2024-09-10 NOTE — PROGRESS NOTES
"Renetta Waldron is a 64 y.o. female on HOD1 POD1 of admission s/p AICG to anterior mandible and removal of reconstruction plates.     Subjective   No significant events overnight. Patient reports about 4 hours of intermittent sleep. Patient reports severe pain in the iliac crest area. Patient reports she has been able to ambulate. She endorse flatus but denies urination or bowel movements at this time. Patient reports she is tolerating PO well. Patient has sharp shooting pain at the       Objective     Physical Exam  HENT:      Head:      Comments: Patient has moderate swelling of mandible bilateral that is TTP. Patient has CN7 marginal mandibular weakness. CN5 intact and equal bilaterally. FOM soft. Mouth opening 40mm+. Patient extraoral sutures are CDI. Intraorally, single suture placed at anterior most implant LLQ with single suture. FOM soft.         Mouth/Throat:      Mouth: Mucous membranes are moist.   Eyes:      Conjunctiva/sclera: Conjunctivae normal.   Cardiovascular:      Comments: Warm well perfused  Pulmonary:      Effort: Pulmonary effort is normal.   Abdominal:      Comments: Mild tenderness to palpation of LRQ. Likely referred pain. Patient endorses pain on palpation when moving right hip.    Musculoskeletal:      Cervical back: Normal range of motion.   Skin:     General: Skin is warm.   Neurological:      Mental Status: She is alert and oriented to person, place, and time.         Last Recorded Vitals  Blood pressure 141/88, pulse 89, temperature 36.4 °C (97.5 °F), temperature source Temporal, resp. rate 18, height 1.676 m (5' 6\"), weight 70.7 kg (155 lb 13.8 oz), SpO2 93%.  Intake/Output last 3 Shifts:  No intake/output data recorded.    Relevant Results    Assessment & Plan      Patient is a 64 year old female HOD 1 POD 1 s/p AICG to anterior mandible and removal of reconstruction plate. She is in moderate pain that is appropriate for postoperative period. Plan is to consult physical therapy to " evaluate mobility as well as long term management.     (P) Plan:    OMFS     - Elevate HOB 30 degrees  - Monitor intake and output    NEURO  - Tylenol 650 mg q6hr sheduled  - Toradol 30 mg q6hr PRN  -Tramadol 50mg q8hr PRN     HEENT  - Peridex mouth rinse TID      CARDIO  - Hemodynamically stable/no interventions needed     PULM  - Satting appropriately on room air without concerns for respiratory compromise/ no interventions needed/ continuous pulse oximetry    GI/Diet  - Full liquid diet       - No interventions needed    ID  - IV clindamycin 600 mg in dextrose 5%     DVT prophylaxis  - Enoxaparin 40 mg q24h  - SCDs    [place] OMFS [place] [phone] with any issues/concerns. If any issue with contacting via pager, please contact Lady Aldridge via [employer] call personal number & text.     2nd call to contact via [employer] is Nahun Monroy  3rd call via [employer] is Pa Roper     In the case of emergency. Personal contact information is found in [employer].     Lady Aldridge   [facility] PGY-1     Lady Aldridge, DMD

## 2024-09-10 NOTE — CARE PLAN
The patient's goals for the shift include  keep pain at a tolerable level throughout shift.     The clinical goals for the shift include remain safe and free of falls throughout shift.       Problem: Pain - Adult  Goal: Verbalizes/displays adequate comfort level or baseline comfort level  Outcome: Progressing     Problem: Safety - Adult  Goal: Free from fall injury  Outcome: Progressing     Problem: Discharge Planning  Goal: Discharge to home or other facility with appropriate resources  Outcome: Progressing     Problem: Chronic Conditions and Co-morbidities  Goal: Patient's chronic conditions and co-morbidity symptoms are monitored and maintained or improved  Outcome: Progressing

## 2024-09-10 NOTE — SIGNIFICANT EVENT
Patient is a 64 year old female HOD 1 POD 0 s/p AICG to anterior mandible and removal of reconstruction plate.    (S) Subjective:    Patient reports moderate/severe pain in iliac crest area, but minimal pain in extraoral incision site. She denies breathing or swallowing problems. She is not able to ambulate properly due to her pain in hip area. She endorses flatus but denies urination or bowel movements.    (O) Objective:    Physical Exam  Constitutional:       Appearance: Normal appearance.   HENT:      Head:      Comments: Patient has no facial asymmetry, only minimal anterior mandibular swelling  Negative TTP b/l  No b/l TMJ clicking/popping/TTP  ESTEVAN is 40+  CN VII is intact b/l  CN V1 an V2 is intact b/l  CN V3 is slightly hyperesthetic over the anterior chin area  Intraoral exam reveals no dehiscence    Eyes:      Pupils: Pupils are equal, round, and reactive to light.   Cardiovascular:      Comments: Warm and well-perfused  Pulmonary:      Comments: Breathing comfortably on room air  Abdominal:      General: Abdomen is flat. There is distension.      Palpations: Abdomen is soft.      Comments: Patient has IBS    Musculoskeletal:         General: Normal range of motion.      Cervical back: Normal range of motion.   Skin:     General: Skin is warm and dry.   Neurological:      Mental Status: She is alert and oriented to person, place, and time.   Psychiatric:         Mood and Affect: Mood normal.         Behavior: Behavior normal.      (A) Assessment:    Patient is a 64 year old female HOD 1 POD 0 s/p AICG to anterior mandible and removal of reconstruction plate. She is in moderate pain that is appropriate for postoperative period.    (P) Plan:    OMFS    - Elevate HOB 30 degrees  - Monitor intake and output    NEURO  - Tylenol 650 mg q6hr sheduled  - Toradol 30 mg q6hr PRN    HEENT  - Peridex mouth rinse TID      CARDIO  - Hemodynamically stable/no interventions needed     PULM  - Satting appropriately on room  air without concerns for respiratory compromise/ no interventions needed/ continuous pulse oximetry    GI/Diet  - Full liquid diet       - No interventions needed    ID  - IV clindamycin 600 mg in dextrose 5%     DVT prophylaxis  - Enoxaparin 40 mg q24h  - SCDs     [place] [phone] with any issues/concerns. If any issue with contacting via pager, please contact Celina Monterroso via [employer] call personal number & text.     2nd call to contact via [employer] is Nahun Monroy  3rd call via [employer] is Pa Roper     In the case of emergency. Personal contact information is found in [employer].     Celina Monterroso  OMFS PGY-1

## 2024-09-11 VITALS
BODY MASS INDEX: 25.05 KG/M2 | DIASTOLIC BLOOD PRESSURE: 65 MMHG | OXYGEN SATURATION: 92 % | HEART RATE: 73 BPM | TEMPERATURE: 97.7 F | SYSTOLIC BLOOD PRESSURE: 101 MMHG | RESPIRATION RATE: 18 BRPM | WEIGHT: 155.87 LBS | HEIGHT: 66 IN

## 2024-09-11 PROCEDURE — 2500000001 HC RX 250 WO HCPCS SELF ADMINISTERED DRUGS (ALT 637 FOR MEDICARE OP)

## 2024-09-11 PROCEDURE — 2500000004 HC RX 250 GENERAL PHARMACY W/ HCPCS (ALT 636 FOR OP/ED): Mod: JZ

## 2024-09-11 PROCEDURE — 7100000011 HC EXTENDED STAY RECOVERY HOURLY - NURSING UNIT

## 2024-09-11 RX ORDER — CLINDAMYCIN HYDROCHLORIDE 300 MG/1
300 CAPSULE ORAL 3 TIMES DAILY
Qty: 15 CAPSULE | Refills: 0 | Status: SHIPPED | OUTPATIENT
Start: 2024-09-11 | End: 2024-09-16

## 2024-09-11 RX ORDER — BACITRACIN ZINC 500 UNIT/G
OINTMENT (GRAM) TOPICAL 3 TIMES DAILY
Qty: 14 G | Refills: 0 | Status: SHIPPED | OUTPATIENT
Start: 2024-09-11 | End: 2024-09-14

## 2024-09-11 RX ORDER — ACETAMINOPHEN 325 MG/1
650 TABLET ORAL EVERY 6 HOURS PRN
Qty: 56 TABLET | Refills: 0 | Status: SHIPPED | OUTPATIENT
Start: 2024-09-11 | End: 2024-09-18

## 2024-09-11 RX ORDER — TRAMADOL HYDROCHLORIDE 50 MG/1
50 TABLET ORAL EVERY 6 HOURS PRN
Qty: 15 TABLET | Refills: 0 | Status: SHIPPED | OUTPATIENT
Start: 2024-09-11

## 2024-09-11 ASSESSMENT — COGNITIVE AND FUNCTIONAL STATUS - GENERAL
DAILY ACTIVITIY SCORE: 20
MOBILITY SCORE: 24
PERSONAL GROOMING: TOTAL
TOILETING: A LITTLE

## 2024-09-11 ASSESSMENT — PAIN SCALES - GENERAL
PAINLEVEL_OUTOF10: 10 - WORST POSSIBLE PAIN
PAINLEVEL_OUTOF10: 10 - WORST POSSIBLE PAIN

## 2024-09-11 ASSESSMENT — PAIN - FUNCTIONAL ASSESSMENT
PAIN_FUNCTIONAL_ASSESSMENT: 0-10
PAIN_FUNCTIONAL_ASSESSMENT: 0-10

## 2024-09-11 NOTE — CARE PLAN
The clinical goals for the shift include patients pain will be well managed through end of shift      Problem: Pain - Adult  Goal: Verbalizes/displays adequate comfort level or baseline comfort level  Outcome: Progressing     Problem: Safety - Adult  Goal: Free from fall injury  Outcome: Progressing     Problem: Discharge Planning  Goal: Discharge to home or other facility with appropriate resources  Outcome: Progressing     Problem: Chronic Conditions and Co-morbidities  Goal: Patient's chronic conditions and co-morbidity symptoms are monitored and maintained or improved  Outcome: Progressing     Problem: Pain  Goal: Takes deep breaths with improved pain control throughout the shift  Outcome: Progressing  Goal: Turns in bed with improved pain control throughout the shift  Outcome: Progressing  Goal: Walks with improved pain control throughout the shift  Outcome: Progressing  Goal: Performs ADL's with improved pain control throughout shift  Outcome: Progressing  Goal: Participates in PT with improved pain control throughout the shift  Outcome: Progressing  Goal: Free from opioid side effects throughout the shift  Outcome: Progressing  Goal: Free from acute confusion related to pain meds throughout the shift  Outcome: Progressing

## 2024-09-11 NOTE — DISCHARGE INSTRUCTIONS
FS Discharge Instructions:    Procedure: Anterior iliac hip graft to mandible   Surgeon: Dr. Kane Salcedo     INCISION CARE:  - Facial/Hip Incision Care: Cleanse all facial/neck incisions twice daily with baby shampoo or mild soap and water. Apply petroleum jelly/vaseline to incision line twice daily until incision has healed. Please use bacitracin ointment as indicated on prescription for incision care..        HYGIENE  - Do not brush your teeth, rinse your mouth or spit for the first 24 hours  - May brush your teeth gently starting on day 2 after surgery  - You may shower, do not allow direct water stream on your incisions  - Do not bath, soak, or shower until directed by your doctor    DIET  - Do not drink through a straw.  - Start first with clear liquids, and then gradually advance to soup, and soft foods.  - Avoiding eating foods that are spicy, hot, tough, or chrunchy until the surgical sites have healed.    MEDICATIONS  - Your new medications: Clindamycin 300 mg, Tramadol 50 mg, Tylenol 325 mg, Bacitracin   - Acetaminophen (Tylenol):  Take every 6 hours for the first 7 days  - Tramadol: Take every 6 hours as needed for severe pain.  - Antibiotic Clindamycin : Take every 4 hours for 5 days.  - Bacitracin:  Apply ointment to incisions on skin three times daily.       PAIN & ANTIBIOTCS  - Pain medication should be taken only during the time that you feel significant discomfort. If the pain is severe, the prescription medication can be used. However, if only mild discomfort is experienced, try to use a less potent, over the counter medication such as Advil (ibuprofen and/or Tylenol (Acetaminophen). These can be taken in crushed tablets or in liquid form.  - Antibiotics: If prescribed please take antibiotics at the appropriate interval as described on the bottle. Be sure not to miss any doses and take the medicine until it is gone.    FIRST DAY AFTER SURGERY  - Hygiene: Return to your normal brushing  routine, being very careful around surgery site(s)  - Avoid using full-strength over the counter mouthwashes for 2 weeks.  - Begin using warm salt-water rinse (1/4 teaspoon salt in a glass of warm water) after meals for the first week.  - Pain and swelling is normal and expected, and may last for 10-14 days. Don´t be alarmed if the third day is the worst.  - Continue eating soft foods. You may begin to gradually return to your normal diet as tolerated. Avoid spicy foods and drinks for 2 weeks.    ACTIVITY  - Do not lift more than 15 pounds until directed by your doctor  - Do not perform intense physical activity until directed by your doctor  - Do not drive while taking narcotics  - Do not smoke    BLEEDING  - It is normal to experience light bleeding or oozing for up to 24 hours. If there is severe bleeding follow instructions at the bottom of the form under ``Excessive Bleeding´´    PLEASE REPORT ANY OF THE FOLLOWING TO OUR TEAM:  - Sudden or excessive bleeding, swelling, or bruising.  - Any itching, rash, or adverse reaction to medication.  - Fever over 100 degrees Fahrenheit.  - Drainage or discharge from the incision sites (other than blood).  - Any injury to the face over the next 6 weeks.    If you have any questions or concerns please contact us during regular office hours ([phone]). For any emergency or after hours questions do not hesitate to contact the resident on call. You may call [phone] for the hospital  and ask for the ``Oral Surgeon On Call´´.

## 2024-09-11 NOTE — PROGRESS NOTES
Renetta Waldron is a 64 y.o. female on HOD1 POD1 of admission s/p AICG to anterior mandible and removal of reconstruction plates.     Subjective   No significant events overnight. Patient refuses to use continuous pulse ox. Patient reports about intermittent sleep after taking oxy 5 mg at 2AM. Patient reports increased pain and swelling in the mandible, floor of mouth, right hip area. Patient reports swelling and TTP to mandible bilaterally. She endorse flatus, urination, bowel movements and ambulation at this time. She is able to tolerate fluids PO well.     Patient reports a burning sensation at back of throat especially upon consumption of fluids or swallowing. Patient reports no difficulty breathing, managing secretions or change in voice.      Objective     Physical Exam  HENT:      Head:      Comments: Patient has moderate extraoral swelling. Swelling is located on both the left and right mandibular border flanking the surgical site. Swelling extends towards the sternum.  Surgical incision is CDI. Tenderness to palpation of the mandible bilaterally. No swelling or TTP of maxilla. Some tenderness to palpation on left submandibular aspect of the incision site with some induration appreciated. CN5/CN7 equal and intact bilaterally.      Mouth/Throat:      Mouth: Mucous membranes are moist.      Comments: Intraorally patient has suture at implant site #28. Edentulous ridges appreciated. Ecchymosis appreciated on floor of mouth. Floor of mouth is soft on right and slightly elevated/indurated on patient left. No elevation of tongue appreciated. Some tenderness to palpation of lingual aspect of mandible. Airway free and non-obstructed.   Eyes:      Conjunctiva/sclera: Conjunctivae normal.   Cardiovascular:      Comments: Warm well perfused  Pulmonary:      Effort: Pulmonary effort is normal.   Abdominal:      Comments: Mild tenderness to palpation of LRQ. Likely referred pain. Patient endorses pain on palpation when  "moving right hip.    Musculoskeletal:      Cervical back: Normal range of motion.   Skin:     General: Skin is warm.   Neurological:      Mental Status: She is alert and oriented to person, place, and time.         Last Recorded Vitals  Blood pressure 94/61, pulse 66, temperature 36.3 °C (97.3 °F), temperature source Temporal, resp. rate 18, height 1.676 m (5' 6\"), weight 70.7 kg (155 lb 13.8 oz), SpO2 93%.    Relevant Results  Results for orders placed or performed during the hospital encounter of 09/09/24 (from the past 24 hour(s))   CBC   Result Value Ref Range    WBC 11.5 (H) 4.4 - 11.3 x10*3/uL    nRBC 0.0 0.0 - 0.0 /100 WBCs    RBC 4.38 4.00 - 5.20 x10*6/uL    Hemoglobin 12.2 12.0 - 16.0 g/dL    Hematocrit 38.9 36.0 - 46.0 %    MCV 89 80 - 100 fL    MCH 27.9 26.0 - 34.0 pg    MCHC 31.4 (L) 32.0 - 36.0 g/dL    RDW 14.5 11.5 - 14.5 %    Platelets 261 150 - 450 x10*3/uL   Basic Metabolic Panel   Result Value Ref Range    Glucose 99 74 - 99 mg/dL    Sodium 140 136 - 145 mmol/L    Potassium 4.6 3.5 - 5.3 mmol/L    Chloride 103 98 - 107 mmol/L    Bicarbonate 31 21 - 32 mmol/L    Anion Gap 11 10 - 20 mmol/L    Urea Nitrogen 7 6 - 23 mg/dL    Creatinine 0.76 0.50 - 1.05 mg/dL    eGFR 88 >60 mL/min/1.73m*2    Calcium 8.9 8.6 - 10.6 mg/dL       Assessment & Plan    Patient is a 64 year old female HOD 2 POD 2 s/p AICG to anterior mandible and removal of reconstruction plates. Physical therapy was consulted and reported an AMPACT score 18 and normal functional limits of extremities and torso. Patient has moderate swelling of mandible with no signs of acute infection or compromise to breathing. Patient is healing adequately given post-operative period.      (P) Plan:    OMFS     - Elevate HOB 30 degrees  - Monitor intake and output    NEURO  - Tylenol 650 mg q6hr sheduled  - Toradol 30 mg q6hr PRN  -Tramadol 50mg q8hr PRN     HEENT  - Peridex mouth rinse TID      CARDIO  - Hemodynamically stable/no interventions needed   "   PULM  - Satting appropriately on room air without concerns for respiratory compromise/ no interventions needed/ continuous pulse oximetry    GI/Diet  - Full liquid diet       - No interventions needed    ID  - IV clindamycin 600 mg in dextrose 5%     DVT prophylaxis  - Enoxaparin 40 mg q24h  - SCDs    [place]FS at [phone] with any issues/concerns. If any issue with contacting via pager, please contact Lady Aldridge via [email or web address] call personal number & text.     2nd call to contact via [employer] is Nahun Monroy  3rd call via [employer] is Pa Roper     In the case of emergency. Personal contact information is found in [employer].     Lady Aldridge   OMFS PGY-1     Lady Aldridge, DMD

## 2024-09-11 NOTE — DISCHARGE SUMMARY
Discharge Diagnosis  Osteomyelitis (Multi)    Issues Requiring Follow-Up  Reconstruction of the mandible with anterior iliac hip bone graft.     Test Results Pending At Discharge  Pending Labs       No current pending labs.            Hospital Course   9/9 - Following surgery, patient recovered in PACU before being transferred to [place] [id number] for further recovery. Clinically stable, started on IV clindamycin and Tylenol 650 mg for pain.   9/10 - Physical therapy consulted and reported AMPACT score indicating normal functional limits of extremities and torso. Patient reported increased pain overnight Moderate edema present with no signs of infection. Patient continued on IV clindamycin, Tylenol 650 mg. Patient started on 5 mg oxycodone, ketorlac, tramadol.   9/11 - Patient's pain well controlled, received 5 mg dose of oxycodone. Patient deemed clinically stable and ready to be discharged.     Pertinent Physical Exam At Time of Discharge  Physical Exam  Constitutional:       Appearance: Normal appearance.   HENT:      Head: Normocephalic.      Comments: Patient has moderate extraoral swelling. Swelling is located on both the left and right mandibular border flanking the surgical site. Swelling extends towards the sternum.  Surgical incision is CDI. Tenderness to palpation of the mandible bilaterally. No swelling or TTP of maxilla. Some tenderness to palpation on left submandibular aspect of the incision site with some induration appreciated. CN5/CN7 equal and intact bilaterally.     Intraorally patient has suture at implant site #28. Edentulous ridges appreciated. Ecchymosis appreciated on floor of mouth. Floor of mouth is soft on right and slightly elevated/indurated on patient left. No elevation of tongue appreciated. Some tenderness to palpation of lingual aspect of mandible. Airway free and non-obstructed.     Mild tenderness to palpation of LRQ. Likely referred pain. Patient endorses pain on palpation when  moving right hip.     Eyes:      Extraocular Movements: Extraocular movements intact.      Pupils: Pupils are equal, round, and reactive to light.   Neurological:      Mental Status: She is alert.         Home Medications     Medication List      ASK your doctor about these medications     acetaminophen 325 mg tablet; Commonly known as: TylenoL; Take 2 tablets   (650 mg) by mouth every 6 hours if needed for mild pain (1 - 3) or fever   (temp greater than 38.0 C).   chlorhexidine 0.12 % solution; Commonly known as: Peridex; Use 15 mL in   the mouth or throat if needed for wound care.   ibuprofen 600 mg tablet; Take 1 tablet (600 mg) by mouth every 8 hours   if needed for mild pain (1 - 3) or fever (temp greater than 38.0 C).       Outpatient Follow-Up  Follow-up scheduled 9/16/24 in [facility] outpatient clinic.     Paul Kearns, TIMMY

## 2024-09-12 NOTE — NURSING NOTE
Yohana was educated on d.c. papers and meds. She was given a copy of the paperwork and Dr. Kearns said ok for ice use for pain control as well.    Gracie Albarran RN

## 2024-09-25 ENCOUNTER — APPOINTMENT (OUTPATIENT)
Dept: RADIOLOGY | Facility: HOSPITAL | Age: 64
End: 2024-09-25
Payer: OTHER GOVERNMENT

## 2024-09-25 ENCOUNTER — HOSPITAL ENCOUNTER (EMERGENCY)
Facility: HOSPITAL | Age: 64
Discharge: HOME | End: 2024-09-25
Attending: EMERGENCY MEDICINE
Payer: OTHER GOVERNMENT

## 2024-09-25 VITALS
TEMPERATURE: 98.2 F | BODY MASS INDEX: 25.01 KG/M2 | WEIGHT: 155.65 LBS | SYSTOLIC BLOOD PRESSURE: 146 MMHG | RESPIRATION RATE: 18 BRPM | HEIGHT: 66 IN | OXYGEN SATURATION: 97 % | HEART RATE: 88 BPM | DIASTOLIC BLOOD PRESSURE: 78 MMHG

## 2024-09-25 DIAGNOSIS — T81.49XA WOUND INFECTION AFTER SURGERY: Primary | ICD-10-CM

## 2024-09-25 LAB
ALBUMIN SERPL BCP-MCNC: 4.2 G/DL (ref 3.4–5)
ALP SERPL-CCNC: 92 U/L (ref 33–136)
ALT SERPL W P-5'-P-CCNC: 6 U/L (ref 7–45)
ANION GAP SERPL CALC-SCNC: 13 MMOL/L (ref 10–20)
APPEARANCE UR: CLEAR
AST SERPL W P-5'-P-CCNC: 13 U/L (ref 9–39)
BASOPHILS # BLD AUTO: 0.03 X10*3/UL (ref 0–0.1)
BASOPHILS NFR BLD AUTO: 0.3 %
BILIRUB SERPL-MCNC: 0.5 MG/DL (ref 0–1.2)
BILIRUB UR STRIP.AUTO-MCNC: NEGATIVE MG/DL
BUN SERPL-MCNC: 9 MG/DL (ref 6–23)
CALCIUM SERPL-MCNC: 9.5 MG/DL (ref 8.6–10.3)
CHLORIDE SERPL-SCNC: 102 MMOL/L (ref 98–107)
CO2 SERPL-SCNC: 28 MMOL/L (ref 21–32)
COLOR UR: NORMAL
CREAT SERPL-MCNC: 0.78 MG/DL (ref 0.5–1.05)
EGFRCR SERPLBLD CKD-EPI 2021: 85 ML/MIN/1.73M*2
EOSINOPHIL # BLD AUTO: 0.07 X10*3/UL (ref 0–0.7)
EOSINOPHIL NFR BLD AUTO: 0.7 %
ERYTHROCYTE [DISTWIDTH] IN BLOOD BY AUTOMATED COUNT: 14.2 % (ref 11.5–14.5)
GLUCOSE SERPL-MCNC: 91 MG/DL (ref 74–99)
GLUCOSE UR STRIP.AUTO-MCNC: NORMAL MG/DL
HCT VFR BLD AUTO: 41.6 % (ref 36–46)
HGB BLD-MCNC: 13.5 G/DL (ref 12–16)
IMM GRANULOCYTES # BLD AUTO: 0.03 X10*3/UL (ref 0–0.7)
IMM GRANULOCYTES NFR BLD AUTO: 0.3 % (ref 0–0.9)
KETONES UR STRIP.AUTO-MCNC: NEGATIVE MG/DL
LACTATE SERPL-SCNC: 0.7 MMOL/L (ref 0.4–2)
LEUKOCYTE ESTERASE UR QL STRIP.AUTO: NEGATIVE
LYMPHOCYTES # BLD AUTO: 2.49 X10*3/UL (ref 1.2–4.8)
LYMPHOCYTES NFR BLD AUTO: 24 %
MCH RBC QN AUTO: 27.7 PG (ref 26–34)
MCHC RBC AUTO-ENTMCNC: 32.5 G/DL (ref 32–36)
MCV RBC AUTO: 85 FL (ref 80–100)
MONOCYTES # BLD AUTO: 0.54 X10*3/UL (ref 0.1–1)
MONOCYTES NFR BLD AUTO: 5.2 %
NEUTROPHILS # BLD AUTO: 7.21 X10*3/UL (ref 1.2–7.7)
NEUTROPHILS NFR BLD AUTO: 69.5 %
NITRITE UR QL STRIP.AUTO: NEGATIVE
NRBC BLD-RTO: 0 /100 WBCS (ref 0–0)
PH UR STRIP.AUTO: 6 [PH]
PLATELET # BLD AUTO: 379 X10*3/UL (ref 150–450)
POTASSIUM SERPL-SCNC: 4.1 MMOL/L (ref 3.5–5.3)
PROT SERPL-MCNC: 6.9 G/DL (ref 6.4–8.2)
PROT UR STRIP.AUTO-MCNC: NEGATIVE MG/DL
RBC # BLD AUTO: 4.87 X10*6/UL (ref 4–5.2)
RBC # UR STRIP.AUTO: NEGATIVE /UL
SODIUM SERPL-SCNC: 139 MMOL/L (ref 136–145)
SP GR UR STRIP.AUTO: 1.01
UROBILINOGEN UR STRIP.AUTO-MCNC: NORMAL MG/DL
WBC # BLD AUTO: 10.4 X10*3/UL (ref 4.4–11.3)

## 2024-09-25 PROCEDURE — 85025 COMPLETE CBC W/AUTO DIFF WBC: CPT | Performed by: EMERGENCY MEDICINE

## 2024-09-25 PROCEDURE — 10060 I&D ABSCESS SIMPLE/SINGLE: CPT | Performed by: EMERGENCY MEDICINE

## 2024-09-25 PROCEDURE — 80053 COMPREHEN METABOLIC PANEL: CPT | Performed by: EMERGENCY MEDICINE

## 2024-09-25 PROCEDURE — 96365 THER/PROPH/DIAG IV INF INIT: CPT | Mod: 59

## 2024-09-25 PROCEDURE — 36415 COLL VENOUS BLD VENIPUNCTURE: CPT | Performed by: EMERGENCY MEDICINE

## 2024-09-25 PROCEDURE — 70491 CT SOFT TISSUE NECK W/DYE: CPT

## 2024-09-25 PROCEDURE — 99285 EMERGENCY DEPT VISIT HI MDM: CPT | Mod: 25

## 2024-09-25 PROCEDURE — 81003 URINALYSIS AUTO W/O SCOPE: CPT | Performed by: EMERGENCY MEDICINE

## 2024-09-25 PROCEDURE — 87205 SMEAR GRAM STAIN: CPT | Mod: ELYLAB | Performed by: EMERGENCY MEDICINE

## 2024-09-25 PROCEDURE — 83605 ASSAY OF LACTIC ACID: CPT | Performed by: EMERGENCY MEDICINE

## 2024-09-25 PROCEDURE — 2550000001 HC RX 255 CONTRASTS: Performed by: EMERGENCY MEDICINE

## 2024-09-25 PROCEDURE — 87040 BLOOD CULTURE FOR BACTERIA: CPT | Mod: ELYLAB | Performed by: EMERGENCY MEDICINE

## 2024-09-25 PROCEDURE — 2500000004 HC RX 250 GENERAL PHARMACY W/ HCPCS (ALT 636 FOR OP/ED): Performed by: EMERGENCY MEDICINE

## 2024-09-25 PROCEDURE — 70491 CT SOFT TISSUE NECK W/DYE: CPT | Performed by: RADIOLOGY

## 2024-09-25 PROCEDURE — 96366 THER/PROPH/DIAG IV INF ADDON: CPT | Mod: 59

## 2024-09-25 RX ORDER — VANCOMYCIN HYDROCHLORIDE 1 G/200ML
1000 INJECTION, SOLUTION INTRAVENOUS ONCE
Status: COMPLETED | OUTPATIENT
Start: 2024-09-25 | End: 2024-09-25

## 2024-09-25 RX ORDER — MORPHINE SULFATE 4 MG/ML
4 INJECTION, SOLUTION INTRAMUSCULAR; INTRAVENOUS ONCE
Status: DISCONTINUED | OUTPATIENT
Start: 2024-09-25 | End: 2024-09-25 | Stop reason: HOSPADM

## 2024-09-25 RX ORDER — ONDANSETRON HYDROCHLORIDE 2 MG/ML
4 INJECTION, SOLUTION INTRAVENOUS ONCE
Status: DISCONTINUED | OUTPATIENT
Start: 2024-09-25 | End: 2024-09-25 | Stop reason: HOSPADM

## 2024-09-25 RX ORDER — CLINDAMYCIN PHOSPHATE 900 MG/50ML
900 INJECTION, SOLUTION INTRAVENOUS ONCE
Status: DISCONTINUED | OUTPATIENT
Start: 2024-09-25 | End: 2024-09-25 | Stop reason: HOSPADM

## 2024-09-25 RX ADMIN — VANCOMYCIN HYDROCHLORIDE 1000 MG: 1 INJECTION, SOLUTION INTRAVENOUS at 17:29

## 2024-09-25 RX ADMIN — IOHEXOL 75 ML: 350 INJECTION, SOLUTION INTRAVENOUS at 18:11

## 2024-09-25 RX ADMIN — SODIUM CHLORIDE 1000 ML: 9 INJECTION, SOLUTION INTRAVENOUS at 17:30

## 2024-09-25 ASSESSMENT — PAIN - FUNCTIONAL ASSESSMENT: PAIN_FUNCTIONAL_ASSESSMENT: 0-10

## 2024-09-25 ASSESSMENT — PAIN DESCRIPTION - ONSET: ONSET: ONGOING

## 2024-09-25 ASSESSMENT — LIFESTYLE VARIABLES
HAVE YOU EVER FELT YOU SHOULD CUT DOWN ON YOUR DRINKING: NO
EVER FELT BAD OR GUILTY ABOUT YOUR DRINKING: NO
HAVE PEOPLE ANNOYED YOU BY CRITICIZING YOUR DRINKING: NO
EVER HAD A DRINK FIRST THING IN THE MORNING TO STEADY YOUR NERVES TO GET RID OF A HANGOVER: NO
TOTAL SCORE: 0

## 2024-09-25 ASSESSMENT — COLUMBIA-SUICIDE SEVERITY RATING SCALE - C-SSRS
2. HAVE YOU ACTUALLY HAD ANY THOUGHTS OF KILLING YOURSELF?: NO
6. HAVE YOU EVER DONE ANYTHING, STARTED TO DO ANYTHING, OR PREPARED TO DO ANYTHING TO END YOUR LIFE?: NO
1. IN THE PAST MONTH, HAVE YOU WISHED YOU WERE DEAD OR WISHED YOU COULD GO TO SLEEP AND NOT WAKE UP?: NO

## 2024-09-25 ASSESSMENT — PAIN DESCRIPTION - PAIN TYPE: TYPE: SURGICAL PAIN

## 2024-09-25 ASSESSMENT — PAIN SCALES - GENERAL: PAINLEVEL_OUTOF10: 5 - MODERATE PAIN

## 2024-09-25 ASSESSMENT — PAIN DESCRIPTION - PROGRESSION: CLINICAL_PROGRESSION: NOT CHANGED

## 2024-09-25 ASSESSMENT — PAIN DESCRIPTION - LOCATION: LOCATION: MOUTH

## 2024-09-25 ASSESSMENT — PAIN DESCRIPTION - FREQUENCY: FREQUENCY: CONSTANT/CONTINUOUS

## 2024-09-25 NOTE — ED PROVIDER NOTES
HPI   Chief Complaint   Patient presents with    Wound Check     Mandible surgery Sept. 9 and now having purulent drainage and discomfort.       HPI: 64-year-old female underwent bone grafting to the mandible at [facility] by Dr. Salcedo earlier this month.  She states that she saw him 2 days ago when he opened up part of the wound and packed it.  She states that it has been having some purulent drainage.  She denies any fevers.  No chest pain.  She states that she is currently on Flagyl.  She states that she had been on clinda.  No voice changes drooling or stridor    Family HX: Denies any significant/pertinent family history.  Social Hx: Denies ETOH or drug use.  Review of systems:  Gen.: No weight loss, fatigue, anorexia, insomnia, fever.   Eyes: No vision loss, double vision, drainage, eye pain.   ENT: No pharyngitis, dry mouth.   Cardiac: No chest pain, palpitations, syncope, near syncope.   Pulmonary: No shortness of breath, cough, hemoptysis.   Heme/lymph: No swollen glands, fever, bleeding.   GI: No abdominal pain, change in bowel habits, melena, hematemesis, hematochezia, nausea, vomiting, diarrhea.   : No discharge, dysuria, frequency, urgency, hematuria.   Musculoskeletal: No limb pain, joint pain, joint swelling.   Skin: No rashes.   Psych: No depression, anxiety, suicidality, homicidality.   Review of systems is otherwise negative unless stated above or in history of present illness.    Physical Exam:    Appearance: Alert, oriented , cooperative,  in no acute distress. Well nourished & well hydrated.    Skin: Intact,  dry skin, no lesions, rash, petechiae or purpura.  Incision with area of packing and some purulent drainage noted    Eyes: PERRLA, EOMs intact,  Conjunctiva pink with no redness or exudates. Eyelids without lesions. No scleral icterus.     ENT: Hearing grossly intact. External auditory canals patent, tympanic membranes intact with visible landmarks. Nares patent, mucus membranes  moist. Dentition without lesions. Pharynx clear, uvula midline.  No obvious hardness to the floor of the mouth.  She is missing the left lower teeth.  There is some mild edema to bilateral angles of the mandible however patient states that that is improved from previous    Neck: Supple, without meningismus. Thyroid not palpable. Trachea at midline. No lymphadenopathy.    Pulmonary: Clear bilaterally with good chest wall excursion. No rales, rhonchi or wheezing. No accessory muscle use or stridor.    Cardiac: Normal S1, S2 without murmur, rub, gallop or extrasystole. No JVD, Carotids without bruits.    Abdomen: Soft, nontender, active bowel sounds.  No palpable organomegaly.  No rebound or guarding.  No CVA tenderness.    Genitourinary: Exam deferred.    Musculoskeletal: Full range of motion. no pain, edema, or deformity. Pulses full and equal. No cyanosis, clubbing, or edema.    Neurological:  Cranial nerves II through XII are grossly intact, finger-nose touch is normal, normal sensation, no weakness, no focal findings identified.    Psychiatric: Appropriate mood and affect.     Medical Decision-Making:  Testing: She had an IV established.  She did have blood cultures as well as a urine and urine culture sent.  I also ordered wound cultures.  Patient was given a dose of IV clindamycin and IV vancomycin.  Patient has multiple antibiotic allergies.  Labs were reviewed.  Patient does not have a leukocytosis with a white count of 10.  She does not have anemia with a hemoglobin of 13.5.  She has a normal lactate.  She has unremarkable electrolytes and a negative urine.  Patient underwent a CT soft tissue neck with IV contrast which was read by radiology as postoperative changes with residual gas and periosteal reaction in the mandibular symphysis periosteal new bone has formed irregular soft tissue mass inferior to the mandible and surrounding the gas collection of the floor of the mouth and upper neck concerning for  osteonecrosis and/or abscess fistula formation suspected.  I did speak to Dr. Sacledo who did do the surgery at [facility].  At this time he states that he feels that these are normal postoperative changes with a possible wound infection.  He states that she would need to see ID.  I discussed this with the patient.  She states that due to her insurance she would need to have all of that ordered by Dr. Salcedo.  We had discussed admission here and she stated that she was concerned that she would miss her appointment with Dr. Salcedo which is for 1030 tomorrow morning therefore she did not want to stay.  At this time I do not see signs of impending Corona's angina.  I had ordered her clindamycin but she declined it.  She is currently on Flagyl.  I did take the old packing out and repacked the wound.  A wound culture was sent.  Please refer to the procedure note.  I offered to send a referral to ID but patient states that it has to come from Dr. Salcedo or her insurance will not cover it.  Patient should have a low threshold to return  Treatment:   Reevaluation:   Plan: Homegoing. Discussed differential. Will follow-up with the primary physician in the next 2-3 days. Return if worse. They understand return precautions and discharge instructions. Patient and family/friend/caregiver are in agreement with this plan.   Impression:   1.  Postoperative wound infection  2.    Labs Reviewed  COMPREHENSIVE METABOLIC PANEL - Abnormal     Glucose                       91                     Sodium                        139                    Potassium                     4.1                    Chloride                      102                    Bicarbonate                   28                     Anion Gap                     13                     Urea Nitrogen                 9                      Creatinine                    0.78                   eGFR                          85                     Calcium                        9.5                    Albumin                       4.2                    Alkaline Phosphatase          92                     Total Protein                 6.9                    AST                           13                     Bilirubin, Total              0.5                    ALT                           6 (*)               LACTATE - Normal     Lactate                       0.7                      Narrative: Venipuncture immediately after or during the administration of Metamizole may lead to falsely low results. Testing should be performed immediately prior to Metamizole dosing.  URINALYSIS WITH REFLEX CULTURE AND MICROSCOPIC - Normal     Color, Urine                                         Appearance, Urine             Clear                  Specific Gravity, Urine       1.007                  pH, Urine                     6.0                    Protein, Urine                NEGATIVE                Glucose, Urine                Normal                 Blood, Urine                  NEGATIVE                Ketones, Urine                NEGATIVE                Bilirubin, Urine              NEGATIVE                Urobilinogen, Urine           Normal                 Nitrite, Urine                NEGATIVE                Leukocyte Esterase, Urine     NEGATIVE             BLOOD CULTURE  BLOOD CULTURE  TISSUE/WOUND CULTURE/SMEAR  CBC WITH AUTO DIFFERENTIAL     WBC                           10.4                   nRBC                          0.0                    RBC                           4.87                   Hemoglobin                    13.5                   Hematocrit                    41.6                   MCV                           85                     MCH                           27.7                   MCHC                          32.5                   RDW                           14.2                   Platelets                     379                    Neutrophils %                  69.5                   Immature Granulocytes %, Automated   0.3                    Lymphocytes %                 24.0                   Monocytes %                   5.2                    Eosinophils %                 0.7                    Basophils %                   0.3                    Neutrophils Absolute          7.21                   Immature Granulocytes Absolute, Au*   0.03                   Lymphocytes Absolute          2.49                   Monocytes Absolute            0.54                   Eosinophils Absolute          0.07                   Basophils Absolute            0.03                URINALYSIS WITH REFLEX CULTURE AND MICROSCOPIC       Narrative: The following orders were created for panel order Urinalysis with Reflex Culture and Microscopic.                Procedure                               Abnormality         Status                                   ---------                               -----------         ------                                   Urinalysis with Reflex C...[[id number]]  Normal              Final result                             Extra Urine Gray Tube[[id number]]                            In process                                               Please view results for these tests on the individual orders.  EXTRA URINE GRAY TUBE     CT soft tissue neck w IV contrast   Final Result    *Postoperative changes as described with residual gas and periosteal    reaction at the mandibular symphysis. *Periosteal new bone has formed    anterior to the bone graft in the midline. *There is an irregular    soft tissue mass inferior to the mandible and surrounding the gas    collection in the floor of the mouth and upper neck concerning for    osteonecrosis and/or abscess. *Fistula formation suspected.          MACRO:    none          Signed by: Ignacio Andre 9/25/2024 6:25 PM    Dictation workstation:   [id number]                    Patient History   Past  Medical History:   Diagnosis Date    COPD (chronic obstructive pulmonary disease) (Multi)      Past Surgical History:   Procedure Laterality Date    COLONOSCOPY       No family history on file.  Social History     Tobacco Use    Smoking status: Every Day     Current packs/day: 0.50     Average packs/day: 0.5 packs/day for 15.0 years (7.5 ttl pk-yrs)     Types: Cigarettes     Start date: 9/9/2009    Smokeless tobacco: Never   Vaping Use    Vaping status: Never Used   Substance Use Topics    Alcohol use: Not Currently    Drug use: Never       Physical Exam   ED Triage Vitals [09/25/24 1646]   Temperature Heart Rate Respirations BP   36.8 °C (98.2 °F) 96 20 152/72      Pulse Ox Temp Source Heart Rate Source Patient Position   96 % Temporal Monitor Sitting      BP Location FiO2 (%)     Right arm --       Physical Exam      ED Course & MDM   Diagnoses as of 09/25/24 2015   Wound infection after surgery                 No data recorded     Erica Coma Scale Score: 15 (09/25/24 1650 : Chan Salazar RN)                           Medical Decision Making      Procedure  Procedures     Ethel Rojo MD  09/25/24 1908       Ethel Rojo MD  09/25/24 2015

## 2024-09-25 NOTE — ED PROCEDURE NOTE
Procedure  Incision and Drainage    Performed by: Ethel Rojo MD  Authorized by: Ethel Rojo MD    Location:     Type:  Surgical wound infection    Location: chin.  Procedure details:     Drainage:  Purulent    Drainage amount:  Scant    Packing materials:  1/4 in iodoform gauze  Post-procedure details:     Procedure completion:  Tolerated               Ethel Rojo MD  09/25/24 1955

## 2024-09-26 ENCOUNTER — HOSPITAL ENCOUNTER (EMERGENCY)
Facility: HOSPITAL | Age: 64
Discharge: HOME | End: 2024-09-26
Attending: EMERGENCY MEDICINE
Payer: OTHER GOVERNMENT

## 2024-09-26 ENCOUNTER — DOCUMENTATION (OUTPATIENT)
Dept: INFECTIOUS DISEASES | Facility: HOSPITAL | Age: 64
End: 2024-09-26
Payer: OTHER GOVERNMENT

## 2024-09-26 VITALS
OXYGEN SATURATION: 97 % | RESPIRATION RATE: 17 BRPM | HEIGHT: 66 IN | BODY MASS INDEX: 25.01 KG/M2 | DIASTOLIC BLOOD PRESSURE: 88 MMHG | SYSTOLIC BLOOD PRESSURE: 132 MMHG | HEART RATE: 95 BPM | TEMPERATURE: 97.9 F | WEIGHT: 155.65 LBS

## 2024-09-26 DIAGNOSIS — G89.18 POST-OP PAIN: Primary | ICD-10-CM

## 2024-09-26 DIAGNOSIS — T81.40XD POSTOPERATIVE INFECTION, UNSPECIFIED TYPE, SUBSEQUENT ENCOUNTER: Primary | ICD-10-CM

## 2024-09-26 LAB
ALBUMIN SERPL BCP-MCNC: 4.5 G/DL (ref 3.4–5)
ALP SERPL-CCNC: 111 U/L (ref 33–136)
ALT SERPL W P-5'-P-CCNC: 9 U/L (ref 7–45)
ANION GAP SERPL CALC-SCNC: 14 MMOL/L (ref 10–20)
AST SERPL W P-5'-P-CCNC: 11 U/L (ref 9–39)
BASOPHILS # BLD AUTO: 0.04 X10*3/UL (ref 0–0.1)
BASOPHILS NFR BLD AUTO: 0.5 %
BILIRUB SERPL-MCNC: 0.6 MG/DL (ref 0–1.2)
BUN SERPL-MCNC: 6 MG/DL (ref 6–23)
CALCIUM SERPL-MCNC: 10.1 MG/DL (ref 8.6–10.6)
CHLORIDE SERPL-SCNC: 103 MMOL/L (ref 98–107)
CO2 SERPL-SCNC: 30 MMOL/L (ref 21–32)
CREAT SERPL-MCNC: 0.8 MG/DL (ref 0.5–1.05)
EGFRCR SERPLBLD CKD-EPI 2021: 82 ML/MIN/1.73M*2
EOSINOPHIL # BLD AUTO: 0.03 X10*3/UL (ref 0–0.7)
EOSINOPHIL NFR BLD AUTO: 0.4 %
ERYTHROCYTE [DISTWIDTH] IN BLOOD BY AUTOMATED COUNT: 14.4 % (ref 11.5–14.5)
GLUCOSE SERPL-MCNC: 90 MG/DL (ref 74–99)
HCT VFR BLD AUTO: 46.7 % (ref 36–46)
HGB BLD-MCNC: 14.7 G/DL (ref 12–16)
HOLD SPECIMEN: NORMAL
IMM GRANULOCYTES # BLD AUTO: 0.03 X10*3/UL (ref 0–0.7)
IMM GRANULOCYTES NFR BLD AUTO: 0.4 % (ref 0–0.9)
LYMPHOCYTES # BLD AUTO: 1.89 X10*3/UL (ref 1.2–4.8)
LYMPHOCYTES NFR BLD AUTO: 24.1 %
MAGNESIUM SERPL-MCNC: 2.31 MG/DL (ref 1.6–2.4)
MCH RBC QN AUTO: 27.6 PG (ref 26–34)
MCHC RBC AUTO-ENTMCNC: 31.5 G/DL (ref 32–36)
MCV RBC AUTO: 88 FL (ref 80–100)
MONOCYTES # BLD AUTO: 0.42 X10*3/UL (ref 0.1–1)
MONOCYTES NFR BLD AUTO: 5.4 %
NEUTROPHILS # BLD AUTO: 5.43 X10*3/UL (ref 1.2–7.7)
NEUTROPHILS NFR BLD AUTO: 69.2 %
NRBC BLD-RTO: 0 /100 WBCS (ref 0–0)
PLATELET # BLD AUTO: 409 X10*3/UL (ref 150–450)
POTASSIUM SERPL-SCNC: 5 MMOL/L (ref 3.5–5.3)
PROT SERPL-MCNC: 7.2 G/DL (ref 6.4–8.2)
RBC # BLD AUTO: 5.33 X10*6/UL (ref 4–5.2)
SODIUM SERPL-SCNC: 142 MMOL/L (ref 136–145)
WBC # BLD AUTO: 7.8 X10*3/UL (ref 4.4–11.3)

## 2024-09-26 PROCEDURE — 99283 EMERGENCY DEPT VISIT LOW MDM: CPT

## 2024-09-26 PROCEDURE — 84075 ASSAY ALKALINE PHOSPHATASE: CPT | Performed by: EMERGENCY MEDICINE

## 2024-09-26 PROCEDURE — 99285 EMERGENCY DEPT VISIT HI MDM: CPT | Performed by: EMERGENCY MEDICINE

## 2024-09-26 PROCEDURE — 36415 COLL VENOUS BLD VENIPUNCTURE: CPT | Performed by: EMERGENCY MEDICINE

## 2024-09-26 PROCEDURE — 85025 COMPLETE CBC W/AUTO DIFF WBC: CPT | Performed by: EMERGENCY MEDICINE

## 2024-09-26 PROCEDURE — 83735 ASSAY OF MAGNESIUM: CPT | Performed by: EMERGENCY MEDICINE

## 2024-09-26 RX ORDER — DOXYCYCLINE 100 MG/1
100 TABLET ORAL 2 TIMES DAILY
Status: SHIPPED | OUTPATIENT
Start: 2024-09-26 | End: 2024-10-06

## 2024-09-26 ASSESSMENT — PAIN - FUNCTIONAL ASSESSMENT: PAIN_FUNCTIONAL_ASSESSMENT: 0-10

## 2024-09-26 ASSESSMENT — COLUMBIA-SUICIDE SEVERITY RATING SCALE - C-SSRS
2. HAVE YOU ACTUALLY HAD ANY THOUGHTS OF KILLING YOURSELF?: NO
1. IN THE PAST MONTH, HAVE YOU WISHED YOU WERE DEAD OR WISHED YOU COULD GO TO SLEEP AND NOT WAKE UP?: NO
6. HAVE YOU EVER DONE ANYTHING, STARTED TO DO ANYTHING, OR PREPARED TO DO ANYTHING TO END YOUR LIFE?: NO

## 2024-09-26 ASSESSMENT — PAIN DESCRIPTION - ORIENTATION: ORIENTATION: RIGHT;UPPER

## 2024-09-26 ASSESSMENT — PAIN SCALES - GENERAL: PAINLEVEL_OUTOF10: 7

## 2024-09-26 ASSESSMENT — PAIN DESCRIPTION - PAIN TYPE: TYPE: SURGICAL PAIN

## 2024-09-26 ASSESSMENT — PAIN DESCRIPTION - LOCATION: LOCATION: MOUTH

## 2024-09-26 NOTE — ED NOTES
[facility] conntacted ED and stated to send pt to their clinic. Dr. Wright aware and saw pt. Pt discharge from ED.      Toñito Griffith RN  09/26/24 2019

## 2024-09-26 NOTE — PROGRESS NOTES
ID Phone Consultation    Spoke with her ortho team.  She had a recent bone graft and there is now some pus coming from the site.  They think this might be a skin/soft tissue infection and were seeking guidance in light of the multiple antibiotic intolerances she has.    I reviewed her chart and based on the review of the notes suggested to the team that they try doxycycline 100mg twice a day.    We also spoke about the possibility of a deeper infection and they will inform the patient about danger signs that would prompt the need for her to be assessed in the ED.

## 2024-09-26 NOTE — ED PROVIDER NOTES
CC: Post-op Problem     History provided by: Patient  Limitations to History: None    HPI:  Patient is a 64-year-old female with history of bone grafting to mandible by [facility] [place] 9, 2024 who presents with chills and jaw pain which began at 4 AM.  She also endorses minimal purulent drainage in dressing after it was repacked a few days ago.  Patient was seen at outside ED yesterday with similar complaint.  She denies any difficulty eating, breathing, voice changes, or any difficulty swallowing at this time.  She has no trismus.  She states she is currently taking Flagyl and clindamycin. No trauma, falls, or injuries. No headache, dizziness, vision changes, neck stiffness, chest pain, shortness of breath, nausea, vomiting, abdominal pain, numbness, tingling, or fevers.     I reviewed discharge summary from [facility] from September 11, 2024 when patient had surgery.  I also reviewed CT soft tissue of neck which was done yesterday with show postop changes with residual gas.  Possible fistula formation suspected.    External Records Reviewed:  I reviewed prior ED visits, Care Everywhere, discharge summaries and outpatient records as appropriate.   ???????????????????????????????????????????????????????????????  Triage Vitals:  T 36.6 °C (97.9 °F)  HR 95  /88  RR 17  O2 97 % None (Room air)    Physical Exam  Vitals and nursing note reviewed.   Constitutional:       General: She is not in acute distress.     Comments: No respiratory distress. Speaks in complete sentences.    HENT:      Head: Normocephalic and atraumatic.      Mouth/Throat:      Mouth: Mucous membranes are moist.      Comments: Area of fluctuance to left mandible without surrounding crepitus. Minimal erythema and swelling to mandible bilaterally. There is a dressing underneath mandible with strikethrough that appears purulent, no trismus, tolerating secretions, no open wounds in oropharynx  Eyes:      Extraocular Movements: Extraocular movements  intact.      Conjunctiva/sclera: Conjunctivae normal.   Cardiovascular:      Rate and Rhythm: Normal rate and regular rhythm.   Pulmonary:      Effort: Pulmonary effort is normal. No respiratory distress.   Abdominal:      General: Abdomen is flat.      Palpations: Abdomen is soft.   Musculoskeletal:         General: Normal range of motion.      Cervical back: Normal range of motion and neck supple. No rigidity.   Skin:     General: Skin is warm and dry.   Neurological:      General: No focal deficit present.      Mental Status: She is alert and oriented to person, place, and time. Mental status is at baseline.      Motor: No weakness.   Psychiatric:         Mood and Affect: Mood normal.         Behavior: Behavior normal.        ???????????????????????????????????????????????????????????????  ED Course/Treatment/Medical Decision Making  MDM:  Patient is a 64-year-old female who presents with postop complaint.  Vital signs are stable, patient does not appear septic.  Patient is afebrile. The patient is in no respiratory distress, satting well on room air. No concern for acute airway compromise. On my examination as she has no trismus, no significant submandibular swelling, and no voice changes.  She is tolerating her secretions, satting appropriately on room air.  I reviewed imaging from yesterday as stated in H&P.  I have low concern for acute deep neck infection warranting ED stay such as Corona's angina or retropharyngeal abscess.  No signs of external necrotizing skin infection as well.  I was messaged by [facility] [place] (Dr. Digna Grider) as well prior to seeing patient and [facility] stated that they have coordinated a clinic visit with her for this morning and they requested that we send the patient directly to their clinic from the ED. [facility] did not want any further workup or imaging in the ED. Patient was offered but declined needing anything for pain at this time. Patient is agreeable to immediately go to [facility] [place]  for evaluation after ED discharge.  Patient had nursing initiated protocol prior to my evaluation with labs ordered and collected however I do not believe this was warranted, and OMFS to follow-up.  I do not believe repeat imaging is needed at this time or emergent OMFS consultation in the ED. The patient felt comfortable being discharged and will be going directly over the OMFS clinic for further management. The patient was instructed of supportive measures and to follow-up with OMFS. Return precautions were provided, for which the patient expressed understanding. The patient was discharged in stable condition. They should feel free to return to the Emergency Department at any time should their condition worsen or should they have any questions or concerns.       ED Course:  Diagnoses as of 09/26/24 0951   Post-op pain         EKG Interpretation:  See ED Course/Below:    Independent Interpretation of Studies:  I independently interpreted labs/imaging as stated in ED Course or below.    Differential diagnoses considered include but are not limited to: See MDM/Below:    Social Determinants Limiting Care:  None identified The following actions were taken to address these social determinants:      Discussion of Management with Other Providers: See MDM/Below:        YADIRA Chamberlain, PGY-3    I reviewed the case with the attending ED physician. The attending ED physician agrees with the plan. Patient and/or patient´s representative was counseled regarding labs, imaging, likely diagnosis, and plan. All questions were answered.    Disclaimer: This note was dictated by speech recognition.  Attempt at proofreading was made to minimize errors.  Errors in transcription may be present.  Please call if questions.    Procedures ? Smart[employer] last updated 9/26/2024 9:51 AM        Zayda Reeder DO  Resident  09/26/24 0955    I saw and evaluated the patient. I personally obtained the key and critical portions of the  history and physical exam or was physically present for key and critical portions performed by the resident/fellow. I reviewed the resident/fellow's documentation and discussed the patient with the resident/fellow. I agree with the resident/fellow's medical decision making as documented in the note.    MD Janelle Forbes MD  09/28/24 2223

## 2024-09-26 NOTE — ED TRIAGE NOTES
Pt reports post op complications for mandibular surgery on 9/9/24. Pt endorses abscess, increased drainage, and developed fevers and chills at home. Drainage is well controlled at this time. Pt reports cultures and packing was done yesterday.

## 2024-09-27 LAB
BACTERIA SPEC CULT: NORMAL
GRAM STN SPEC: NORMAL
GRAM STN SPEC: NORMAL

## 2024-09-29 LAB
BACTERIA BLD CULT: NORMAL
BACTERIA BLD CULT: NORMAL

## 2024-09-30 LAB
BACTERIA BLD CULT: NORMAL
BACTERIA BLD CULT: NORMAL

## 2024-10-05 ENCOUNTER — APPOINTMENT (OUTPATIENT)
Dept: RADIOLOGY | Facility: HOSPITAL | Age: 64
End: 2024-10-05
Payer: OTHER GOVERNMENT

## 2024-10-05 ENCOUNTER — HOSPITAL ENCOUNTER (EMERGENCY)
Facility: HOSPITAL | Age: 64
Discharge: HOME | End: 2024-10-05
Attending: STUDENT IN AN ORGANIZED HEALTH CARE EDUCATION/TRAINING PROGRAM
Payer: OTHER GOVERNMENT

## 2024-10-05 VITALS
TEMPERATURE: 97.5 F | RESPIRATION RATE: 17 BRPM | SYSTOLIC BLOOD PRESSURE: 111 MMHG | BODY MASS INDEX: 24.11 KG/M2 | WEIGHT: 150 LBS | HEIGHT: 66 IN | HEART RATE: 84 BPM | DIASTOLIC BLOOD PRESSURE: 69 MMHG | OXYGEN SATURATION: 95 %

## 2024-10-05 DIAGNOSIS — M96.843 POSTPROCEDURAL SEROMA OF A MUSCULOSKELETAL STRUCTURE FOLLOWING OTHER PROCEDURE: ICD-10-CM

## 2024-10-05 DIAGNOSIS — M25.551 PAIN OF RIGHT HIP: Primary | ICD-10-CM

## 2024-10-05 LAB
ALBUMIN SERPL BCP-MCNC: 4.4 G/DL (ref 3.4–5)
ALP SERPL-CCNC: 107 U/L (ref 33–136)
ALT SERPL W P-5'-P-CCNC: 7 U/L (ref 7–45)
ANION GAP SERPL CALC-SCNC: 11 MMOL/L (ref 10–20)
AST SERPL W P-5'-P-CCNC: 12 U/L (ref 9–39)
BASOPHILS # BLD AUTO: 0.03 X10*3/UL (ref 0–0.1)
BASOPHILS NFR BLD AUTO: 0.3 %
BILIRUB SERPL-MCNC: 0.8 MG/DL (ref 0–1.2)
BUN SERPL-MCNC: 8 MG/DL (ref 6–23)
CALCIUM SERPL-MCNC: 9.7 MG/DL (ref 8.6–10.3)
CHLORIDE SERPL-SCNC: 104 MMOL/L (ref 98–107)
CO2 SERPL-SCNC: 28 MMOL/L (ref 21–32)
CREAT SERPL-MCNC: 0.77 MG/DL (ref 0.5–1.05)
EGFRCR SERPLBLD CKD-EPI 2021: 86 ML/MIN/1.73M*2
EOSINOPHIL # BLD AUTO: 0.04 X10*3/UL (ref 0–0.7)
EOSINOPHIL NFR BLD AUTO: 0.5 %
ERYTHROCYTE [DISTWIDTH] IN BLOOD BY AUTOMATED COUNT: 14.6 % (ref 11.5–14.5)
GLUCOSE SERPL-MCNC: 95 MG/DL (ref 74–99)
HCT VFR BLD AUTO: 43.6 % (ref 36–46)
HGB BLD-MCNC: 14.1 G/DL (ref 12–16)
IMM GRANULOCYTES # BLD AUTO: 0.02 X10*3/UL (ref 0–0.7)
IMM GRANULOCYTES NFR BLD AUTO: 0.2 % (ref 0–0.9)
LACTATE SERPL-SCNC: 0.9 MMOL/L (ref 0.4–2)
LYMPHOCYTES # BLD AUTO: 2.07 X10*3/UL (ref 1.2–4.8)
LYMPHOCYTES NFR BLD AUTO: 23.8 %
MCH RBC QN AUTO: 27.8 PG (ref 26–34)
MCHC RBC AUTO-ENTMCNC: 32.3 G/DL (ref 32–36)
MCV RBC AUTO: 86 FL (ref 80–100)
MONOCYTES # BLD AUTO: 0.38 X10*3/UL (ref 0.1–1)
MONOCYTES NFR BLD AUTO: 4.4 %
NEUTROPHILS # BLD AUTO: 6.16 X10*3/UL (ref 1.2–7.7)
NEUTROPHILS NFR BLD AUTO: 70.8 %
NRBC BLD-RTO: 0 /100 WBCS (ref 0–0)
PLATELET # BLD AUTO: 292 X10*3/UL (ref 150–450)
POTASSIUM SERPL-SCNC: 4.2 MMOL/L (ref 3.5–5.3)
PROT SERPL-MCNC: 7.1 G/DL (ref 6.4–8.2)
RBC # BLD AUTO: 5.07 X10*6/UL (ref 4–5.2)
SODIUM SERPL-SCNC: 139 MMOL/L (ref 136–145)
WBC # BLD AUTO: 8.7 X10*3/UL (ref 4.4–11.3)

## 2024-10-05 PROCEDURE — 96374 THER/PROPH/DIAG INJ IV PUSH: CPT

## 2024-10-05 PROCEDURE — 80053 COMPREHEN METABOLIC PANEL: CPT | Performed by: STUDENT IN AN ORGANIZED HEALTH CARE EDUCATION/TRAINING PROGRAM

## 2024-10-05 PROCEDURE — 74177 CT ABD & PELVIS W/CONTRAST: CPT

## 2024-10-05 PROCEDURE — 2500000004 HC RX 250 GENERAL PHARMACY W/ HCPCS (ALT 636 FOR OP/ED): Performed by: STUDENT IN AN ORGANIZED HEALTH CARE EDUCATION/TRAINING PROGRAM

## 2024-10-05 PROCEDURE — 70487 CT MAXILLOFACIAL W/DYE: CPT

## 2024-10-05 PROCEDURE — 36415 COLL VENOUS BLD VENIPUNCTURE: CPT | Performed by: STUDENT IN AN ORGANIZED HEALTH CARE EDUCATION/TRAINING PROGRAM

## 2024-10-05 PROCEDURE — 74177 CT ABD & PELVIS W/CONTRAST: CPT | Performed by: RADIOLOGY

## 2024-10-05 PROCEDURE — 83605 ASSAY OF LACTIC ACID: CPT | Performed by: STUDENT IN AN ORGANIZED HEALTH CARE EDUCATION/TRAINING PROGRAM

## 2024-10-05 PROCEDURE — 99285 EMERGENCY DEPT VISIT HI MDM: CPT

## 2024-10-05 PROCEDURE — 2550000001 HC RX 255 CONTRASTS: Performed by: STUDENT IN AN ORGANIZED HEALTH CARE EDUCATION/TRAINING PROGRAM

## 2024-10-05 PROCEDURE — 85025 COMPLETE CBC W/AUTO DIFF WBC: CPT | Performed by: STUDENT IN AN ORGANIZED HEALTH CARE EDUCATION/TRAINING PROGRAM

## 2024-10-05 RX ORDER — KETOROLAC TROMETHAMINE 30 MG/ML
15 INJECTION, SOLUTION INTRAMUSCULAR; INTRAVENOUS ONCE
Status: COMPLETED | OUTPATIENT
Start: 2024-10-05 | End: 2024-10-05

## 2024-10-05 RX ADMIN — IOHEXOL 75 ML: 350 INJECTION, SOLUTION INTRAVENOUS at 10:45

## 2024-10-05 RX ADMIN — KETOROLAC TROMETHAMINE 15 MG: 30 INJECTION, SOLUTION INTRAMUSCULAR at 10:19

## 2024-10-05 ASSESSMENT — PAIN SCALES - GENERAL: PAINLEVEL_OUTOF10: 3

## 2024-10-05 ASSESSMENT — LIFESTYLE VARIABLES
EVER FELT BAD OR GUILTY ABOUT YOUR DRINKING: NO
TOTAL SCORE: 0
HAVE YOU EVER FELT YOU SHOULD CUT DOWN ON YOUR DRINKING: NO
HAVE PEOPLE ANNOYED YOU BY CRITICIZING YOUR DRINKING: NO
EVER HAD A DRINK FIRST THING IN THE MORNING TO STEADY YOUR NERVES TO GET RID OF A HANGOVER: NO

## 2024-10-05 ASSESSMENT — PAIN - FUNCTIONAL ASSESSMENT: PAIN_FUNCTIONAL_ASSESSMENT: 0-10

## 2024-10-05 ASSESSMENT — PAIN DESCRIPTION - PAIN TYPE: TYPE: SURGICAL PAIN

## 2024-10-05 NOTE — ED PROVIDER NOTES
HPI   Chief Complaint   Patient presents with    Post-op Problem     Patient having hip pain/nausea       Patient is a 64-year-old female presenting to the emergency department with complaints of right hip pain.  Patient had a bone graft done approximately 1 month ago for reconstruction of her mandible and has been recovering since then.  Patient states she has not had any hip pain until approximately 1 to 2 days ago.  Pain is primarily associated with movement and hitting her lateral hip/gluteal area.  This radiates down her right leg.  Patient states she has had sciatica previously but this is different.  No other pain or complaints appreciated.      History provided by:  Patient          Patient History   Past Medical History:   Diagnosis Date    COPD (chronic obstructive pulmonary disease) (Multi)      Past Surgical History:   Procedure Laterality Date    COLONOSCOPY       No family history on file.  Social History     Tobacco Use    Smoking status: Every Day     Current packs/day: 0.50     Average packs/day: 0.5 packs/day for 15.1 years (7.5 ttl pk-yrs)     Types: Cigarettes     Start date: 9/9/2009    Smokeless tobacco: Never   Vaping Use    Vaping status: Never Used   Substance Use Topics    Alcohol use: Not Currently    Drug use: Never       Physical Exam   ED Triage Vitals [10/05/24 0902]   Temperature Heart Rate Respirations BP   36.4 °C (97.5 °F) 83 18 152/70      Pulse Ox Temp Source Heart Rate Source Patient Position   99 % Temporal Monitor --      BP Location FiO2 (%)     Right arm --       Physical Exam  Vitals and nursing note reviewed.   Constitutional:       General: She is not in acute distress.     Appearance: Normal appearance. She is not ill-appearing, toxic-appearing or diaphoretic.   HENT:      Head: Normocephalic and atraumatic.      Nose: Nose normal.      Mouth/Throat:      Mouth: Mucous membranes are moist.      Pharynx: No oropharyngeal exudate or posterior oropharyngeal erythema.    Eyes:      General: No scleral icterus.     Extraocular Movements: Extraocular movements intact.      Pupils: Pupils are equal, round, and reactive to light.   Cardiovascular:      Rate and Rhythm: Normal rate and regular rhythm.      Pulses: Normal pulses.      Heart sounds: Normal heart sounds. No murmur heard.     No friction rub. No gallop.   Pulmonary:      Effort: Pulmonary effort is normal. No respiratory distress.      Breath sounds: Normal breath sounds. No stridor. No wheezing, rhonchi or rales.   Chest:      Chest wall: No tenderness.   Abdominal:      General: Abdomen is flat. There is no distension.      Palpations: Abdomen is soft. There is no mass.      Tenderness: There is no abdominal tenderness. There is no guarding.      Hernia: No hernia is present.   Musculoskeletal:         General: No swelling, tenderness, deformity or signs of injury. Normal range of motion.      Cervical back: Normal range of motion and neck supple. No rigidity.   Skin:     General: Skin is warm and dry.      Capillary Refill: Capillary refill takes less than 2 seconds.      Coloration: Skin is not jaundiced or pale.      Findings: No bruising, erythema, lesion or rash.      Comments: Well-appearing healing surgical scar to her right hip without apparent signs of infection.    Well-healing surgical incisions with packing noted to the patient's chin without overt signs of infection/cellulitis.   Neurological:      General: No focal deficit present.      Mental Status: She is alert and oriented to person, place, and time. Mental status is at baseline.   Psychiatric:         Mood and Affect: Mood normal.         Behavior: Behavior normal.           ED Course & MDM   Diagnoses as of 10/05/24 1945   Pain of right hip   Postprocedural seroma of a musculoskeletal structure following other procedure                 No data recorded     [place] Coma Scale Score: 15 (10/05/24 1023 : Nel Matamoros RN)                            Medical Decision Making  Patient is 64-year-old female presenting to the emergency department for complaints of right hip pain.  Patient is concerned about possible infection as she has had some subjective fevers and chills but did recently complete a course of antibiotics.  Labs and CAT scans were ordered for further evaluation.  Patient has stable vital signs and does not meet any SIRS criteria.  Patient states that she recently had blood cultures drawn which were reportedly negative.  CBC and CMP unremarkable.  Lactate is normal.  No leukocytosis.  CT of the face shows postsurgical changes without any apparent signs of infection or cellulitis/osteomyelitis.  Patient has findings of a fluid collection measuring 2.2 x 3.5 cm that is adjacent to the right iliac bone donor site.  I spoke to the patient surgeon Dr. Salcedo who believe this most likely represents a seroma and recommended the patient follow-up in our office within normal schedule appointment.  Patient was given Toradol with complete relief of her pain.  Patient's labs and imaging were discussed with her as well as her surgeons recommendations she was advised to utilize over-the-counter NSAIDs for pain relief.  Patient was given return precautions.  Patient verbalized understanding of all instructions given to her.  Patient was appreciative care and agreeable to discharge.      Labs Reviewed   CBC WITH AUTO DIFFERENTIAL - Abnormal       Result Value    WBC 8.7      nRBC 0.0      RBC 5.07      Hemoglobin 14.1      Hematocrit 43.6      MCV 86      MCH 27.8      MCHC 32.3      RDW 14.6 (*)     Platelets 292      Neutrophils % 70.8      Immature Granulocytes %, Automated 0.2      Lymphocytes % 23.8      Monocytes % 4.4      Eosinophils % 0.5      Basophils % 0.3      Neutrophils Absolute 6.16      Immature Granulocytes Absolute, Automated 0.02      Lymphocytes Absolute 2.07      Monocytes Absolute 0.38      Eosinophils Absolute 0.04       Basophils Absolute 0.03     COMPREHENSIVE METABOLIC PANEL - Normal    Glucose 95      Sodium 139      Potassium 4.2      Chloride 104      Bicarbonate 28      Anion Gap 11      Urea Nitrogen 8      Creatinine 0.77      eGFR 86      Calcium 9.7      Albumin 4.4      Alkaline Phosphatase 107      Total Protein 7.1      AST 12      Bilirubin, Total 0.8      ALT 7     LACTATE - Normal    Lactate 0.9      Narrative:     Venipuncture immediately after or during the administration of Metamizole may lead to falsely low results. Testing should be performed immediately prior to Metamizole dosing.     CT facial bones w IV contrast   Final Result   Postoperative changes in the midline of the mandible with bone graft   and internal fixation. Incomplete bony union is suspected; however,   there is no measurable fluid collection or abnormal enhancement to   suggest soft tissue abscess.        There is no abnormal periosteal reaction to suggest osteomyelitis        MACRO:   none        Signed by: Ignacio Andre 10/5/2024 11:12 AM   Dictation workstation:   [id number]      CT abdomen pelvis w IV contrast   Final Result   1. Bone graft donor site at the superior aspect of the right iliac   crest. There is a mildly peripherally enhancing fluid collection   adjacent to the right iliac bone donor site measuring approximately   2.2 x 3.5 cm which may be due to seroma or possibly early abscess. No   gas bubbles are seen. Mild bony irregularity at the donor site may be   due to heterotopic ossification. No bony erosion to suggest   osteomyelitis.        2. Diverticulosis of the colon without evidence for acute   diverticulitis.                  Signed by: Rashawn Santiago 10/5/2024 11:38 AM   Dictation workstation:   [id number]            Procedure  Procedures     Rodriguez Caro DO  10/05/24 1953

## 2024-10-05 NOTE — DISCHARGE INSTRUCTIONS
Please call your doctor or return to the nearest emergency department with any new or worsening symptoms that are concerning to you.  These documents have a lot of useful information! Please read them, so you know what to expect, what you can do for yourself at home, and also to know concerning signs warrant a return to the Emergency Department for additional evaluation.  You are welcome back any time. Thank you for entrusting your care to us, I hope we made your visit as pleasant as possible. Wishing you well!    Dr. Caro

## 2024-10-21 ENCOUNTER — APPOINTMENT (OUTPATIENT)
Dept: RADIOLOGY | Facility: HOSPITAL | Age: 64
End: 2024-10-21
Payer: OTHER GOVERNMENT

## 2024-10-21 ENCOUNTER — APPOINTMENT (OUTPATIENT)
Dept: CARDIOLOGY | Facility: HOSPITAL | Age: 64
End: 2024-10-21
Payer: OTHER GOVERNMENT

## 2024-10-21 ENCOUNTER — HOSPITAL ENCOUNTER (EMERGENCY)
Facility: HOSPITAL | Age: 64
Discharge: HOME | End: 2024-10-21
Attending: EMERGENCY MEDICINE
Payer: OTHER GOVERNMENT

## 2024-10-21 VITALS
SYSTOLIC BLOOD PRESSURE: 124 MMHG | BODY MASS INDEX: 23.3 KG/M2 | RESPIRATION RATE: 20 BRPM | OXYGEN SATURATION: 96 % | HEIGHT: 66 IN | WEIGHT: 145 LBS | HEART RATE: 80 BPM | DIASTOLIC BLOOD PRESSURE: 73 MMHG | TEMPERATURE: 98 F

## 2024-10-21 DIAGNOSIS — R00.2 PALPITATIONS: ICD-10-CM

## 2024-10-21 DIAGNOSIS — R06.02 SHORTNESS OF BREATH: Primary | ICD-10-CM

## 2024-10-21 LAB
ALBUMIN SERPL BCP-MCNC: 4.3 G/DL (ref 3.4–5)
ALP SERPL-CCNC: 118 U/L (ref 33–136)
ALT SERPL W P-5'-P-CCNC: 6 U/L (ref 7–45)
ANION GAP SERPL CALC-SCNC: 11 MMOL/L (ref 10–20)
AST SERPL W P-5'-P-CCNC: 12 U/L (ref 9–39)
BASOPHILS # BLD AUTO: 0.03 X10*3/UL (ref 0–0.1)
BASOPHILS NFR BLD AUTO: 0.3 %
BILIRUB SERPL-MCNC: 0.7 MG/DL (ref 0–1.2)
BNP SERPL-MCNC: 19 PG/ML (ref 0–99)
BUN SERPL-MCNC: 12 MG/DL (ref 6–23)
CALCIUM SERPL-MCNC: 9.6 MG/DL (ref 8.6–10.3)
CARDIAC TROPONIN I PNL SERPL HS: 3 NG/L (ref 0–13)
CHLORIDE SERPL-SCNC: 104 MMOL/L (ref 98–107)
CO2 SERPL-SCNC: 26 MMOL/L (ref 21–32)
CREAT SERPL-MCNC: 0.84 MG/DL (ref 0.5–1.05)
D DIMER PPP FEU-MCNC: 569 NG/ML FEU
EGFRCR SERPLBLD CKD-EPI 2021: 78 ML/MIN/1.73M*2
EOSINOPHIL # BLD AUTO: 0.09 X10*3/UL (ref 0–0.7)
EOSINOPHIL NFR BLD AUTO: 0.9 %
ERYTHROCYTE [DISTWIDTH] IN BLOOD BY AUTOMATED COUNT: 14.1 % (ref 11.5–14.5)
FLUAV RNA RESP QL NAA+PROBE: NOT DETECTED
FLUBV RNA RESP QL NAA+PROBE: NOT DETECTED
GLUCOSE SERPL-MCNC: 82 MG/DL (ref 74–99)
HCT VFR BLD AUTO: 42.3 % (ref 36–46)
HGB BLD-MCNC: 13.8 G/DL (ref 12–16)
IMM GRANULOCYTES # BLD AUTO: 0.03 X10*3/UL (ref 0–0.7)
IMM GRANULOCYTES NFR BLD AUTO: 0.3 % (ref 0–0.9)
INR PPP: 1 (ref 0.9–1.1)
LYMPHOCYTES # BLD AUTO: 2.31 X10*3/UL (ref 1.2–4.8)
LYMPHOCYTES NFR BLD AUTO: 24.3 %
MAGNESIUM SERPL-MCNC: 2.1 MG/DL (ref 1.6–2.4)
MCH RBC QN AUTO: 27.9 PG (ref 26–34)
MCHC RBC AUTO-ENTMCNC: 32.6 G/DL (ref 32–36)
MCV RBC AUTO: 86 FL (ref 80–100)
MONOCYTES # BLD AUTO: 0.6 X10*3/UL (ref 0.1–1)
MONOCYTES NFR BLD AUTO: 6.3 %
NEUTROPHILS # BLD AUTO: 6.44 X10*3/UL (ref 1.2–7.7)
NEUTROPHILS NFR BLD AUTO: 67.9 %
NRBC BLD-RTO: 0 /100 WBCS (ref 0–0)
PLATELET # BLD AUTO: 329 X10*3/UL (ref 150–450)
POTASSIUM SERPL-SCNC: 4 MMOL/L (ref 3.5–5.3)
PROT SERPL-MCNC: 6.6 G/DL (ref 6.4–8.2)
PROTHROMBIN TIME: 11.3 SECONDS (ref 9.8–12.8)
RBC # BLD AUTO: 4.95 X10*6/UL (ref 4–5.2)
SARS-COV-2 RNA RESP QL NAA+PROBE: NOT DETECTED
SODIUM SERPL-SCNC: 137 MMOL/L (ref 136–145)
TSH SERPL-ACNC: 2.19 MIU/L (ref 0.44–3.98)
WBC # BLD AUTO: 9.5 X10*3/UL (ref 4.4–11.3)

## 2024-10-21 PROCEDURE — 83880 ASSAY OF NATRIURETIC PEPTIDE: CPT | Performed by: PHYSICIAN ASSISTANT

## 2024-10-21 PROCEDURE — 84443 ASSAY THYROID STIM HORMONE: CPT | Performed by: PHYSICIAN ASSISTANT

## 2024-10-21 PROCEDURE — 71045 X-RAY EXAM CHEST 1 VIEW: CPT

## 2024-10-21 PROCEDURE — 71045 X-RAY EXAM CHEST 1 VIEW: CPT | Performed by: RADIOLOGY

## 2024-10-21 PROCEDURE — 87636 SARSCOV2 & INF A&B AMP PRB: CPT | Performed by: PHYSICIAN ASSISTANT

## 2024-10-21 PROCEDURE — 85025 COMPLETE CBC W/AUTO DIFF WBC: CPT | Performed by: PHYSICIAN ASSISTANT

## 2024-10-21 PROCEDURE — 99283 EMERGENCY DEPT VISIT LOW MDM: CPT | Mod: 25

## 2024-10-21 PROCEDURE — 80053 COMPREHEN METABOLIC PANEL: CPT | Performed by: PHYSICIAN ASSISTANT

## 2024-10-21 PROCEDURE — 36415 COLL VENOUS BLD VENIPUNCTURE: CPT | Performed by: PHYSICIAN ASSISTANT

## 2024-10-21 PROCEDURE — 85379 FIBRIN DEGRADATION QUANT: CPT | Performed by: PHYSICIAN ASSISTANT

## 2024-10-21 PROCEDURE — 93005 ELECTROCARDIOGRAM TRACING: CPT

## 2024-10-21 PROCEDURE — 83735 ASSAY OF MAGNESIUM: CPT | Performed by: PHYSICIAN ASSISTANT

## 2024-10-21 PROCEDURE — 85610 PROTHROMBIN TIME: CPT | Performed by: PHYSICIAN ASSISTANT

## 2024-10-21 PROCEDURE — 84484 ASSAY OF TROPONIN QUANT: CPT | Performed by: PHYSICIAN ASSISTANT

## 2024-10-21 ASSESSMENT — LIFESTYLE VARIABLES
EVER HAD A DRINK FIRST THING IN THE MORNING TO STEADY YOUR NERVES TO GET RID OF A HANGOVER: NO
HAVE PEOPLE ANNOYED YOU BY CRITICIZING YOUR DRINKING: NO
EVER FELT BAD OR GUILTY ABOUT YOUR DRINKING: NO
TOTAL SCORE: 0
HAVE YOU EVER FELT YOU SHOULD CUT DOWN ON YOUR DRINKING: NO

## 2024-10-21 ASSESSMENT — PAIN SCALES - GENERAL: PAINLEVEL_OUTOF10: 5 - MODERATE PAIN

## 2024-10-21 ASSESSMENT — PAIN - FUNCTIONAL ASSESSMENT: PAIN_FUNCTIONAL_ASSESSMENT: 0-10

## 2024-10-22 LAB
ATRIAL RATE: 75 BPM
P AXIS: 77 DEGREES
P OFFSET: 211 MS
P ONSET: 153 MS
PR INTERVAL: 136 MS
Q ONSET: 221 MS
QRS COUNT: 12 BEATS
QRS DURATION: 82 MS
QT INTERVAL: 356 MS
QTC CALCULATION(BAZETT): 397 MS
QTC FREDERICIA: 383 MS
R AXIS: 86 DEGREES
T AXIS: 77 DEGREES
T OFFSET: 399 MS
VENTRICULAR RATE: 75 BPM

## 2024-10-22 NOTE — DISCHARGE INSTRUCTIONS
PLEASE FOLLOW UP WITH YOUR PRIMARY DOCTOR YOU MAY REQUIRE FURTHER WORKUP OUTPATIENT. RETURN TO NEAREST EMERGENCY DEPARTMENT IF YOU HAVE ANY CONCERNS OR NEW OR WORSENING SYMPTOMS

## 2024-10-22 NOTE — ED PROVIDER NOTES
"HPI   Chief Complaint   Patient presents with    Shortness of Breath     \"I finished antibiotic for pneumonia and I am still having shortness of breath and I had a cough.\"  Speaking rapid sentences with no effort.  \"I get SOB walking up stairs.\"    Rapid Heart Rate     \"It feel like my heart is racing.\"       This is a 64-year-old female with PMH COPD presenting for evaluation of shortness of breath.  States she finished doxycycline for pneumonia and she feels like she cannot get enough air and she still has residual cough and some exertional dyspnea.  She states the doxycycline caused her to have palpitations and elevated heart rate as well and these have not gone away.  Denies fevers, chills, hemoptysis, vomiting, diaphoresis, syncope, near syncope. Denies any prior history of DVT/PE or coagulopathy, unilateral extremity swelling, recent prolonged travel, hemoptysis, history of malignancy, exogenous estrogen. Denies EtOH or illicits.  Non-smoker.      History provided by:  Patient   used: No            Patient History   Past Medical History:   Diagnosis Date    COPD (chronic obstructive pulmonary disease) (Multi)      Past Surgical History:   Procedure Laterality Date    COLONOSCOPY       No family history on file.  Social History     Tobacco Use    Smoking status: Every Day     Current packs/day: 0.50     Average packs/day: 0.5 packs/day for 15.1 years (7.6 ttl pk-yrs)     Types: Cigarettes     Start date: 9/9/2009    Smokeless tobacco: Never   Vaping Use    Vaping status: Never Used   Substance Use Topics    Alcohol use: Not Currently    Drug use: Never       Physical Exam   ED Triage Vitals [10/21/24 1747]   Temperature Heart Rate Respirations BP   36.8 °C (98.2 °F) (!) 106 18 137/70      Pulse Ox Temp Source Heart Rate Source Patient Position   97 % Temporal Monitor Sitting      BP Location FiO2 (%)     Right arm --       Physical Exam    General: Vitals noted, no distress. Afebrile  Neck: " Trachea midline. No JVD appreciated.  Cardiac: Regular rate and rhythm. No murmur  Pulmonary: Lungs clear bilaterally with good aeration. No rales, rhonchi or wheezing  Abdomen: Soft. Nontender  Extremities: No peripheral edema  Skin: No rash  Neuro: No focal neurologic deficits    ED Course & MDM   Diagnoses as of 10/21/24 2301   Shortness of breath   Palpitations                 No data recorded                                 Medical Decision Making  DDx: PE, ACS, bharat-/myocarditis, pneumothorax, pneumonia, pulmonary edema, COPD, dysrhythmia    Patient is stable hemodynamically.  Visibly nontoxic-appearing no apparent distress.  Is in the upper 90s on room air.  No tachypnea or increased work of breathing.  Her troponin and BNP are within normal limits.  Her laboratory evaluation is reassuring.  Her D-dimer is 569 negative by years criteria and age adjustment so I have low suspicion for PE.  Chest x-ray shows no acute cardiopulmonary process.  Patient symptoms may be residual from recent viral or bacterial pneumonia infection was advised to continue supportive care and follow-up with PCP as she may require outpatient follow-up for Holter monitoring or echocardiogram to rule out other etiologies.  This visit was staffed with the attending physician Dr. Boo.      Disclaimer: This note was dictated using speech recognition software. An attempt at proofreading was made to minimize errors. Minor errors in transcription may be present. Please call if questions.    Amount and/or Complexity of Data Reviewed  Labs: ordered.  Radiology: ordered.  ECG/medicine tests: ordered and independent interpretation performed.     Details: EKG interpreted by me: Normal sinus arrhythmia.  Rate 75.  Normal axis.  QTc 397 ms.  No acute T wave changes.  No STEMI.        Procedure  Procedures     Rigo Sanchez PA-C  10/21/24 2306

## 2024-11-22 ENCOUNTER — APPOINTMENT (OUTPATIENT)
Dept: RADIOLOGY | Facility: HOSPITAL | Age: 64
End: 2024-11-22
Payer: OTHER GOVERNMENT

## 2024-11-22 ENCOUNTER — APPOINTMENT (OUTPATIENT)
Dept: CARDIOLOGY | Facility: HOSPITAL | Age: 64
End: 2024-11-22
Payer: OTHER GOVERNMENT

## 2024-11-22 ENCOUNTER — HOSPITAL ENCOUNTER (EMERGENCY)
Facility: HOSPITAL | Age: 64
Discharge: HOME | End: 2024-11-22
Attending: STUDENT IN AN ORGANIZED HEALTH CARE EDUCATION/TRAINING PROGRAM
Payer: OTHER GOVERNMENT

## 2024-11-22 VITALS
SYSTOLIC BLOOD PRESSURE: 124 MMHG | RESPIRATION RATE: 17 BRPM | WEIGHT: 150 LBS | OXYGEN SATURATION: 97 % | HEART RATE: 84 BPM | BODY MASS INDEX: 24.11 KG/M2 | TEMPERATURE: 97.2 F | HEIGHT: 66 IN | DIASTOLIC BLOOD PRESSURE: 71 MMHG

## 2024-11-22 DIAGNOSIS — R07.81 PLEURITIC PAIN: Primary | ICD-10-CM

## 2024-11-22 LAB
ALBUMIN SERPL BCP-MCNC: 4.2 G/DL (ref 3.4–5)
ALP SERPL-CCNC: 108 U/L (ref 33–136)
ALT SERPL W P-5'-P-CCNC: 7 U/L (ref 7–45)
ANION GAP SERPL CALC-SCNC: 11 MMOL/L (ref 10–20)
APTT PPP: 37 SECONDS (ref 27–38)
AST SERPL W P-5'-P-CCNC: 12 U/L (ref 9–39)
BASOPHILS # BLD AUTO: 0.04 X10*3/UL (ref 0–0.1)
BASOPHILS NFR BLD AUTO: 0.5 %
BILIRUB SERPL-MCNC: 0.6 MG/DL (ref 0–1.2)
BNP SERPL-MCNC: 21 PG/ML (ref 0–99)
BUN SERPL-MCNC: 10 MG/DL (ref 6–23)
CALCIUM SERPL-MCNC: 9.1 MG/DL (ref 8.6–10.3)
CARDIAC TROPONIN I PNL SERPL HS: 3 NG/L (ref 0–13)
CARDIAC TROPONIN I PNL SERPL HS: <3 NG/L (ref 0–13)
CHLORIDE SERPL-SCNC: 106 MMOL/L (ref 98–107)
CO2 SERPL-SCNC: 24 MMOL/L (ref 21–32)
CREAT SERPL-MCNC: 0.93 MG/DL (ref 0.5–1.05)
EGFRCR SERPLBLD CKD-EPI 2021: 69 ML/MIN/1.73M*2
EOSINOPHIL # BLD AUTO: 0.13 X10*3/UL (ref 0–0.7)
EOSINOPHIL NFR BLD AUTO: 1.6 %
ERYTHROCYTE [DISTWIDTH] IN BLOOD BY AUTOMATED COUNT: 13.7 % (ref 11.5–14.5)
GLUCOSE SERPL-MCNC: 89 MG/DL (ref 74–99)
HCT VFR BLD AUTO: 43.4 % (ref 36–46)
HGB BLD-MCNC: 13.9 G/DL (ref 12–16)
IMM GRANULOCYTES # BLD AUTO: 0.03 X10*3/UL (ref 0–0.7)
IMM GRANULOCYTES NFR BLD AUTO: 0.4 % (ref 0–0.9)
INR PPP: 1.1 (ref 0.9–1.1)
LYMPHOCYTES # BLD AUTO: 2.67 X10*3/UL (ref 1.2–4.8)
LYMPHOCYTES NFR BLD AUTO: 33 %
MAGNESIUM SERPL-MCNC: 2.03 MG/DL (ref 1.6–2.4)
MCH RBC QN AUTO: 27.4 PG (ref 26–34)
MCHC RBC AUTO-ENTMCNC: 32 G/DL (ref 32–36)
MCV RBC AUTO: 85 FL (ref 80–100)
MONOCYTES # BLD AUTO: 0.56 X10*3/UL (ref 0.1–1)
MONOCYTES NFR BLD AUTO: 6.9 %
NEUTROPHILS # BLD AUTO: 4.65 X10*3/UL (ref 1.2–7.7)
NEUTROPHILS NFR BLD AUTO: 57.6 %
NRBC BLD-RTO: 0 /100 WBCS (ref 0–0)
PLATELET # BLD AUTO: 290 X10*3/UL (ref 150–450)
POTASSIUM SERPL-SCNC: 4.1 MMOL/L (ref 3.5–5.3)
PROT SERPL-MCNC: 6.5 G/DL (ref 6.4–8.2)
PROTHROMBIN TIME: 12.9 SECONDS (ref 9.8–12.8)
RBC # BLD AUTO: 5.08 X10*6/UL (ref 4–5.2)
SODIUM SERPL-SCNC: 137 MMOL/L (ref 136–145)
WBC # BLD AUTO: 8.1 X10*3/UL (ref 4.4–11.3)

## 2024-11-22 PROCEDURE — 80053 COMPREHEN METABOLIC PANEL: CPT | Performed by: PHYSICIAN ASSISTANT

## 2024-11-22 PROCEDURE — 85610 PROTHROMBIN TIME: CPT | Performed by: PHYSICIAN ASSISTANT

## 2024-11-22 PROCEDURE — 93005 ELECTROCARDIOGRAM TRACING: CPT

## 2024-11-22 PROCEDURE — 83735 ASSAY OF MAGNESIUM: CPT | Performed by: PHYSICIAN ASSISTANT

## 2024-11-22 PROCEDURE — 2550000001 HC RX 255 CONTRASTS: Performed by: PHYSICIAN ASSISTANT

## 2024-11-22 PROCEDURE — 84484 ASSAY OF TROPONIN QUANT: CPT | Performed by: PHYSICIAN ASSISTANT

## 2024-11-22 PROCEDURE — 36415 COLL VENOUS BLD VENIPUNCTURE: CPT | Performed by: PHYSICIAN ASSISTANT

## 2024-11-22 PROCEDURE — 85025 COMPLETE CBC W/AUTO DIFF WBC: CPT | Performed by: PHYSICIAN ASSISTANT

## 2024-11-22 PROCEDURE — 71275 CT ANGIOGRAPHY CHEST: CPT

## 2024-11-22 PROCEDURE — 85730 THROMBOPLASTIN TIME PARTIAL: CPT | Performed by: PHYSICIAN ASSISTANT

## 2024-11-22 PROCEDURE — 99285 EMERGENCY DEPT VISIT HI MDM: CPT | Mod: 25

## 2024-11-22 PROCEDURE — 71275 CT ANGIOGRAPHY CHEST: CPT | Mod: FOREIGN READ | Performed by: RADIOLOGY

## 2024-11-22 PROCEDURE — 83880 ASSAY OF NATRIURETIC PEPTIDE: CPT | Performed by: PHYSICIAN ASSISTANT

## 2024-11-22 RX ADMIN — IOHEXOL 75 ML: 350 INJECTION, SOLUTION INTRAVENOUS at 22:12

## 2024-11-22 ASSESSMENT — PAIN SCALES - GENERAL: PAINLEVEL_OUTOF10: 7

## 2024-11-22 ASSESSMENT — LIFESTYLE VARIABLES
HAVE YOU EVER FELT YOU SHOULD CUT DOWN ON YOUR DRINKING: NO
EVER FELT BAD OR GUILTY ABOUT YOUR DRINKING: NO
EVER HAD A DRINK FIRST THING IN THE MORNING TO STEADY YOUR NERVES TO GET RID OF A HANGOVER: NO
HAVE PEOPLE ANNOYED YOU BY CRITICIZING YOUR DRINKING: NO
TOTAL SCORE: 0

## 2024-11-22 ASSESSMENT — PAIN DESCRIPTION - ORIENTATION: ORIENTATION: LEFT

## 2024-11-22 ASSESSMENT — PAIN DESCRIPTION - FREQUENCY: FREQUENCY: CONSTANT/CONTINUOUS

## 2024-11-22 ASSESSMENT — PAIN - FUNCTIONAL ASSESSMENT: PAIN_FUNCTIONAL_ASSESSMENT: 0-10

## 2024-11-23 LAB
ATRIAL RATE: 84 BPM
P AXIS: 75 DEGREES
P OFFSET: 211 MS
P ONSET: 154 MS
PR INTERVAL: 140 MS
Q ONSET: 224 MS
QRS COUNT: 14 BEATS
QRS DURATION: 82 MS
QT INTERVAL: 344 MS
QTC CALCULATION(BAZETT): 406 MS
QTC FREDERICIA: 384 MS
R AXIS: 86 DEGREES
T AXIS: 70 DEGREES
T OFFSET: 396 MS
VENTRICULAR RATE: 84 BPM

## 2024-11-23 NOTE — ED PROVIDER NOTES
HPI   Chief Complaint   Patient presents with    Back Pain    Shoulder Pain     Pt complaining of back pain when breathing. Pt was seen on 11/13/24 and diagnosed with PE       This is a 64-year-old female with PMH COPD, [facility] is currently [facility] Eliquis presenting for evaluation of pleuritic left-sided scapular and rib pain which has started within the last day.  States it feels like the same pain she had on the other side when she was diagnosed with a PE recently.  She does endorse cough but denies any wheezing or shortness of breath, palpitations, anterior chest pain, vomiting, diaphoresis, syncope.      History provided by:  Patient   used: No            Patient History   Past Medical History:   Diagnosis Date    COPD (chronic obstructive pulmonary disease) (Multi)      Past Surgical History:   Procedure Laterality Date    COLONOSCOPY       No family history on file.  Social History     Tobacco Use    Smoking status: Every Day     Current packs/day: 0.50     Average packs/day: 0.5 packs/day for 15.2 years (7.6 ttl pk-yrs)     Types: Cigarettes     Start date: 9/9/2009    Smokeless tobacco: Never   Vaping Use    Vaping status: Never Used   Substance Use Topics    Alcohol use: Not Currently    Drug use: Never       Physical Exam   ED Triage Vitals [11/22/24 2040]   Temperature Heart Rate Respirations BP   36.2 °C (97.2 °F) 92 18 146/65      Pulse Ox Temp Source Heart Rate Source Patient Position   96 % Temporal Monitor Sitting      BP Location FiO2 (%)     Right arm --       Physical Exam  General: Vitals noted, no distress. Afebrile  Neck: Trachea midline. No JVD appreciated.  Cardiac: Regular rate and rhythm. No murmur  Pulmonary: Lungs clear bilaterally with good aeration. No rales, rhonchi or wheezing. Reproducible left-sided chest wall pain and scapular pain.  Abdomen: Soft. Nontender  Extremities: No peripheral edema  Skin: No rash  Neuro:  Alert &  Oriented    ED Course & MDM   Diagnoses as of 11/22/24 2304   Pleuritic pain                 No data recorded     [place] Coma Scale Score: 15 (11/22/24 2134 : Vasile Cain RN)                           Medical Decision Making  DDx: PE, bronchitis, pneumonia, pericarditis    Patient is stable hemodynamically.  Visibly nontoxic-appearing no apparent distress.  Has reproducible left-sided chest wall pain and scapular pain.  Given recent diagnosis of PE there is concern for clot propagation.  Her CT study was negative for acute PE or acute infiltrate or edema.  Her laboratory evaluation is reassuring.  Patient was advised to continue Eliquis.  His suspect her pain is chest wall pain and inflammation secondary to cough questionably bronchitis.  At this time the patient was advised to continue taking her Eliquis and follow-up with her PCP.  Instructed to return to the nearest ED if any concerns or new or worsening symptoms. Patient verbalized understanding and agreement with plan. Discharged in stable condition.  This visit was staffed with the attending physician Dr. Rey.      Disclaimer: This note was dictated using speech recognition software. An attempt at proofreading was made to minimize errors. Minor errors in transcription may be present. Please call if questions.    Amount and/or Complexity of Data Reviewed  Labs: ordered.  Radiology: ordered.  ECG/medicine tests: ordered and independent interpretation performed.     Details: EKG interpreted by me: Normal sinus rhythm.  Rate 84.  Normal axis.  QTc 406.  No acute T wave changes.  No STEMI.        Procedure  Procedures     Rigo Sanchez PA-C  11/22/24 1128

## 2024-12-04 ENCOUNTER — HOSPITAL ENCOUNTER (EMERGENCY)
Facility: HOSPITAL | Age: 64
Discharge: HOME | End: 2024-12-04
Payer: OTHER GOVERNMENT

## 2024-12-04 ENCOUNTER — APPOINTMENT (OUTPATIENT)
Dept: RADIOLOGY | Facility: HOSPITAL | Age: 64
End: 2024-12-04
Payer: OTHER GOVERNMENT

## 2024-12-04 VITALS
HEIGHT: 67 IN | DIASTOLIC BLOOD PRESSURE: 69 MMHG | TEMPERATURE: 97.2 F | SYSTOLIC BLOOD PRESSURE: 118 MMHG | WEIGHT: 150 LBS | HEART RATE: 78 BPM | BODY MASS INDEX: 23.54 KG/M2 | RESPIRATION RATE: 18 BRPM | OXYGEN SATURATION: 94 %

## 2024-12-04 DIAGNOSIS — M27.2 INFECTION OF MANDIBLE: Primary | ICD-10-CM

## 2024-12-04 DIAGNOSIS — R68.84 JAW PAIN: Primary | ICD-10-CM

## 2024-12-04 LAB
ALBUMIN SERPL BCP-MCNC: 4.4 G/DL (ref 3.4–5)
ALP SERPL-CCNC: 106 U/L (ref 33–136)
ALT SERPL W P-5'-P-CCNC: 8 U/L (ref 7–45)
ANION GAP SERPL CALC-SCNC: 12 MMOL/L (ref 10–20)
AST SERPL W P-5'-P-CCNC: 14 U/L (ref 9–39)
BASOPHILS # BLD AUTO: 0.03 X10*3/UL (ref 0–0.1)
BASOPHILS NFR BLD AUTO: 0.7 %
BILIRUB SERPL-MCNC: 0.7 MG/DL (ref 0–1.2)
BUN SERPL-MCNC: 9 MG/DL (ref 6–23)
CALCIUM SERPL-MCNC: 9.4 MG/DL (ref 8.6–10.3)
CHLORIDE SERPL-SCNC: 106 MMOL/L (ref 98–107)
CO2 SERPL-SCNC: 25 MMOL/L (ref 21–32)
CREAT SERPL-MCNC: 0.71 MG/DL (ref 0.5–1.05)
EGFRCR SERPLBLD CKD-EPI 2021: >90 ML/MIN/1.73M*2
EOSINOPHIL # BLD AUTO: 0.01 X10*3/UL (ref 0–0.7)
EOSINOPHIL NFR BLD AUTO: 0.2 %
ERYTHROCYTE [DISTWIDTH] IN BLOOD BY AUTOMATED COUNT: 14.1 % (ref 11.5–14.5)
GLUCOSE SERPL-MCNC: 98 MG/DL (ref 74–99)
HCT VFR BLD AUTO: 46 % (ref 36–46)
HGB BLD-MCNC: 14.8 G/DL (ref 12–16)
IMM GRANULOCYTES # BLD AUTO: 0.01 X10*3/UL (ref 0–0.7)
IMM GRANULOCYTES NFR BLD AUTO: 0.2 % (ref 0–0.9)
LYMPHOCYTES # BLD AUTO: 1.78 X10*3/UL (ref 1.2–4.8)
LYMPHOCYTES NFR BLD AUTO: 41.5 %
MCH RBC QN AUTO: 27.3 PG (ref 26–34)
MCHC RBC AUTO-ENTMCNC: 32.2 G/DL (ref 32–36)
MCV RBC AUTO: 85 FL (ref 80–100)
MONOCYTES # BLD AUTO: 0.32 X10*3/UL (ref 0.1–1)
MONOCYTES NFR BLD AUTO: 7.5 %
NEUTROPHILS # BLD AUTO: 2.14 X10*3/UL (ref 1.2–7.7)
NEUTROPHILS NFR BLD AUTO: 49.9 %
NRBC BLD-RTO: 0 /100 WBCS (ref 0–0)
PLATELET # BLD AUTO: 242 X10*3/UL (ref 150–450)
POTASSIUM SERPL-SCNC: 4.3 MMOL/L (ref 3.5–5.3)
PROT SERPL-MCNC: 7 G/DL (ref 6.4–8.2)
RBC # BLD AUTO: 5.43 X10*6/UL (ref 4–5.2)
SODIUM SERPL-SCNC: 139 MMOL/L (ref 136–145)
WBC # BLD AUTO: 4.3 X10*3/UL (ref 4.4–11.3)

## 2024-12-04 PROCEDURE — 70487 CT MAXILLOFACIAL W/DYE: CPT

## 2024-12-04 PROCEDURE — 2550000001 HC RX 255 CONTRASTS: Performed by: PHYSICIAN ASSISTANT

## 2024-12-04 PROCEDURE — 85025 COMPLETE CBC W/AUTO DIFF WBC: CPT | Performed by: PHYSICIAN ASSISTANT

## 2024-12-04 PROCEDURE — 36415 COLL VENOUS BLD VENIPUNCTURE: CPT | Performed by: PHYSICIAN ASSISTANT

## 2024-12-04 PROCEDURE — 80053 COMPREHEN METABOLIC PANEL: CPT | Performed by: PHYSICIAN ASSISTANT

## 2024-12-04 PROCEDURE — 70486 CT MAXILLOFACIAL W/O DYE: CPT | Performed by: RADIOLOGY

## 2024-12-04 PROCEDURE — 99284 EMERGENCY DEPT VISIT MOD MDM: CPT

## 2024-12-04 RX ADMIN — IOHEXOL 75 ML: 350 INJECTION, SOLUTION INTRAVENOUS at 15:28

## 2024-12-04 ASSESSMENT — PAIN SCALES - GENERAL: PAINLEVEL_OUTOF10: 0 - NO PAIN

## 2024-12-04 ASSESSMENT — PAIN - FUNCTIONAL ASSESSMENT: PAIN_FUNCTIONAL_ASSESSMENT: 0-10

## 2024-12-04 ASSESSMENT — LIFESTYLE VARIABLES
EVER HAD A DRINK FIRST THING IN THE MORNING TO STEADY YOUR NERVES TO GET RID OF A HANGOVER: NO
HAVE YOU EVER FELT YOU SHOULD CUT DOWN ON YOUR DRINKING: NO
TOTAL SCORE: 0
HAVE PEOPLE ANNOYED YOU BY CRITICIZING YOUR DRINKING: NO
EVER FELT BAD OR GUILTY ABOUT YOUR DRINKING: NO

## 2024-12-04 NOTE — ED PROVIDER NOTES
HPI   Chief Complaint   Patient presents with    Jaw Pain     Pt had a mandible replacement September 9 and now has puss coming from the site.  Dr cejas a CT scan. Dr Salcedo is the oral surgeon.         History provided by:  Patient   used: No      64-year-old female presents with needing a CAT scan of her mandible due to a possible infection.  She has been seeing oral surgery for the past 3 months that she has had continued issues after a dental implant.  She states that she had some pus coming out of this area.  Denies fever, difficulty swallowing, drooling, voice changes    ROS negative unless otherwise stated in HPI          Patient History   Past Medical History:   Diagnosis Date    COPD (chronic obstructive pulmonary disease) (Multi)      Past Surgical History:   Procedure Laterality Date    COLONOSCOPY      MANDIBLE SURGERY       No family history on file.  Social History     Tobacco Use    Smoking status: Every Day     Current packs/day: 0.50     Average packs/day: 0.5 packs/day for 15.2 years (7.6 ttl pk-yrs)     Types: Cigarettes     Start date: 9/9/2009    Smokeless tobacco: Never   Vaping Use    Vaping status: Never Used   Substance Use Topics    Alcohol use: Not Currently    Drug use: Never       Physical Exam   ED Triage Vitals [12/04/24 1330]   Temperature Heart Rate Respirations BP   36.2 °C (97.2 °F) 99 20 148/84      Pulse Ox Temp Source Heart Rate Source Patient Position   96 % Temporal Monitor Sitting      BP Location FiO2 (%)     Right arm --       Physical Exam    General: alert, no distress, well nourished, talking in full and complete sentences  Skin: dry, intact, no rashes  Head: atraumatic  Eyes: EOMI, PERRLA, normal conjunctiva  Throat: oropharynx clear, tonsils not enlarged, uvula midline, no stridor, no hot potato voice  Nose: nares patent  Mouth: mucous membranes moist, multiple missing teeth, very minimal exposed dental implant to the right lower jawline without  pus or gum swelling  Respiratory: non labored breathing  Extremities: FROM X4, strength +5/5  Neuro: A&Ox3  Psych: cooperative, appropriate mood        ED Course & MDM   Diagnoses as of 12/04/24 1702   Jaw pain                 No data recorded     [place] Coma Scale Score: 15 (12/04/24 1326 : Chan Salazar RN)                       Labs Reviewed   CBC WITH AUTO DIFFERENTIAL - Abnormal       Result Value    WBC 4.3 (*)     nRBC 0.0      RBC 5.43 (*)     Hemoglobin 14.8      Hematocrit 46.0      MCV 85      MCH 27.3      MCHC 32.2      RDW 14.1      Platelets 242      Neutrophils % 49.9      Immature Granulocytes %, Automated 0.2      Lymphocytes % 41.5      Monocytes % 7.5      Eosinophils % 0.2      Basophils % 0.7      Neutrophils Absolute 2.14      Immature Granulocytes Absolute, Automated 0.01      Lymphocytes Absolute 1.78      Monocytes Absolute 0.32      Eosinophils Absolute 0.01      Basophils Absolute 0.03     COMPREHENSIVE METABOLIC PANEL - Normal    Glucose 98      Sodium 139      Potassium 4.3      Chloride 106      Bicarbonate 25      Anion Gap 12      Urea Nitrogen 9      Creatinine 0.71      eGFR >90      Calcium 9.4      Albumin 4.4      Alkaline Phosphatase 106      Total Protein 7.0      AST 14      Bilirubin, Total 0.7      ALT 8        CT facial bones w IV contrast   Final Result   Postoperative changes anterior mandible with bone graft and fixation   screws present. There is incomplete osseous incorporation of the   graft.        Artifact generated by hardware obscures the anterior oral cavity.   There is no rim enhancing fluid collection to suggest abscess either   along the external or deep margins of the bone graft.        MACRO:   None        Signed by: Rocky Greenwood 12/4/2024 4:15 PM   Dictation workstation:   [id number]            Medical Decision Making  WBC 4.3.  No other significant lab normalities.  She has a follow-up with the family doctor regarding her WBC.  No acute findings  on final read of CT facial bones.  Patient is to follow-up with her oral surgeon.  She is already on clindamycin.  Tried to discharge patient and patient states that she wants the oral surgeon to take a look at the images prior to discharge.  I did talk to Dr. Salcedo, oral surgeon, who states that he does not need to see the pictures and recommends discharge and follow-up outpatient.  I discussed this with the patient and she becomes upset as she states that something is wrong and she needs to know why.  I discussed to continue with her antibiotic and she states that she is not going to take it.  She asked what number we called for Dr. Salcedo and I let the patient know that I do not know what number they call, but I talk to him myself.  Patient is argumentative and states that Dr. Salcedo does not have an office so she does not know where to go.  I discussed with the patient that she needs to follow-up with whoever she has been seeing for the past 3 months.  The patient requested for me to specifically talk to Dr. Salcedo for her case review.  Patient is upset prior to discharge.  I repeatedly discussed with the patient that there are no emergent findings on the CAT scan and she is safe to be discharged home.  Afebrile, not tachycardic, not tachypneic, not hypoxic, tolerating PO, non toxic appearing and ambulating at baseline at discharge. Hemodynamically intact. Patient instructed on return precautions. All questions answered.       Procedure  Procedures     Lulú Nina PA-C  12/04/24 1704       Lulú Nina PA-C  12/04/24 1713

## 2024-12-09 ENCOUNTER — LAB REQUISITION (OUTPATIENT)
Dept: LAB | Facility: HOSPITAL | Age: 64
End: 2024-12-09
Payer: OTHER GOVERNMENT

## 2024-12-09 PROCEDURE — 87185 SC STD ENZYME DETCJ PER NZM: CPT

## 2024-12-09 PROCEDURE — 87075 CULTR BACTERIA EXCEPT BLOOD: CPT

## 2024-12-09 PROCEDURE — 87205 SMEAR GRAM STAIN: CPT

## 2024-12-09 PROCEDURE — 87070 CULTURE OTHR SPECIMN AEROBIC: CPT

## 2024-12-11 LAB
B-LACTAMASE ORGANISM ISLT: POSITIVE
BACTERIA SPEC CULT: NORMAL
BACTERIA SPEC CULT: NORMAL
GRAM STN SPEC: NORMAL
GRAM STN SPEC: NORMAL

## 2024-12-12 ENCOUNTER — HOSPITAL ENCOUNTER (EMERGENCY)
Facility: HOSPITAL | Age: 64
Discharge: HOME | End: 2024-12-12
Attending: EMERGENCY MEDICINE
Payer: OTHER GOVERNMENT

## 2024-12-12 ENCOUNTER — APPOINTMENT (OUTPATIENT)
Dept: CARDIOLOGY | Facility: HOSPITAL | Age: 64
End: 2024-12-12
Payer: OTHER GOVERNMENT

## 2024-12-12 VITALS
DIASTOLIC BLOOD PRESSURE: 70 MMHG | HEIGHT: 66 IN | TEMPERATURE: 97.7 F | HEART RATE: 90 BPM | RESPIRATION RATE: 20 BRPM | SYSTOLIC BLOOD PRESSURE: 155 MMHG | OXYGEN SATURATION: 98 % | WEIGHT: 150 LBS | BODY MASS INDEX: 24.11 KG/M2

## 2024-12-12 DIAGNOSIS — R68.84 PAIN IN LOWER JAW: Primary | ICD-10-CM

## 2024-12-12 LAB
ALBUMIN SERPL BCP-MCNC: 4.3 G/DL (ref 3.4–5)
ALP SERPL-CCNC: 94 U/L (ref 33–136)
ALT SERPL W P-5'-P-CCNC: 9 U/L (ref 7–45)
ANION GAP SERPL CALC-SCNC: 12 MMOL/L (ref 10–20)
AST SERPL W P-5'-P-CCNC: 15 U/L (ref 9–39)
BASOPHILS # BLD AUTO: 0.03 X10*3/UL (ref 0–0.1)
BASOPHILS NFR BLD AUTO: 0.7 %
BILIRUB SERPL-MCNC: 1.1 MG/DL (ref 0–1.2)
BUN SERPL-MCNC: 6 MG/DL (ref 6–23)
CALCIUM SERPL-MCNC: 9.2 MG/DL (ref 8.6–10.3)
CARDIAC TROPONIN I PNL SERPL HS: <3 NG/L (ref 0–13)
CHLORIDE SERPL-SCNC: 105 MMOL/L (ref 98–107)
CO2 SERPL-SCNC: 26 MMOL/L (ref 21–32)
CREAT SERPL-MCNC: 0.7 MG/DL (ref 0.5–1.05)
CRP SERPL-MCNC: 5.5 MG/DL
EGFRCR SERPLBLD CKD-EPI 2021: >90 ML/MIN/1.73M*2
EOSINOPHIL # BLD AUTO: 0.03 X10*3/UL (ref 0–0.7)
EOSINOPHIL NFR BLD AUTO: 0.7 %
ERYTHROCYTE [DISTWIDTH] IN BLOOD BY AUTOMATED COUNT: 13.8 % (ref 11.5–14.5)
GLUCOSE SERPL-MCNC: 102 MG/DL (ref 74–99)
HCT VFR BLD AUTO: 45.1 % (ref 36–46)
HGB BLD-MCNC: 14.8 G/DL (ref 12–16)
IMM GRANULOCYTES # BLD AUTO: 0.01 X10*3/UL (ref 0–0.7)
IMM GRANULOCYTES NFR BLD AUTO: 0.2 % (ref 0–0.9)
LACTATE SERPL-SCNC: 0.7 MMOL/L (ref 0.4–2)
LYMPHOCYTES # BLD AUTO: 1.45 X10*3/UL (ref 1.2–4.8)
LYMPHOCYTES NFR BLD AUTO: 36 %
MCH RBC QN AUTO: 27.4 PG (ref 26–34)
MCHC RBC AUTO-ENTMCNC: 32.8 G/DL (ref 32–36)
MCV RBC AUTO: 83 FL (ref 80–100)
MONOCYTES # BLD AUTO: 0.6 X10*3/UL (ref 0.1–1)
MONOCYTES NFR BLD AUTO: 14.9 %
NEUTROPHILS # BLD AUTO: 1.91 X10*3/UL (ref 1.2–7.7)
NEUTROPHILS NFR BLD AUTO: 47.5 %
NRBC BLD-RTO: 0 /100 WBCS (ref 0–0)
PLATELET # BLD AUTO: 253 X10*3/UL (ref 150–450)
POTASSIUM SERPL-SCNC: 4 MMOL/L (ref 3.5–5.3)
PROT SERPL-MCNC: 7 G/DL (ref 6.4–8.2)
RBC # BLD AUTO: 5.41 X10*6/UL (ref 4–5.2)
SODIUM SERPL-SCNC: 139 MMOL/L (ref 136–145)
WBC # BLD AUTO: 4 X10*3/UL (ref 4.4–11.3)

## 2024-12-12 PROCEDURE — 83605 ASSAY OF LACTIC ACID: CPT

## 2024-12-12 PROCEDURE — 36415 COLL VENOUS BLD VENIPUNCTURE: CPT

## 2024-12-12 PROCEDURE — 96375 TX/PRO/DX INJ NEW DRUG ADDON: CPT

## 2024-12-12 PROCEDURE — 86140 C-REACTIVE PROTEIN: CPT

## 2024-12-12 PROCEDURE — 93005 ELECTROCARDIOGRAM TRACING: CPT

## 2024-12-12 PROCEDURE — 80053 COMPREHEN METABOLIC PANEL: CPT

## 2024-12-12 PROCEDURE — 99284 EMERGENCY DEPT VISIT MOD MDM: CPT | Mod: 25 | Performed by: EMERGENCY MEDICINE

## 2024-12-12 PROCEDURE — 84484 ASSAY OF TROPONIN QUANT: CPT

## 2024-12-12 PROCEDURE — 2500000004 HC RX 250 GENERAL PHARMACY W/ HCPCS (ALT 636 FOR OP/ED)

## 2024-12-12 PROCEDURE — 96374 THER/PROPH/DIAG INJ IV PUSH: CPT

## 2024-12-12 PROCEDURE — 85025 COMPLETE CBC W/AUTO DIFF WBC: CPT

## 2024-12-12 RX ORDER — KETOROLAC TROMETHAMINE 30 MG/ML
15 INJECTION, SOLUTION INTRAMUSCULAR; INTRAVENOUS ONCE
Status: COMPLETED | OUTPATIENT
Start: 2024-12-12 | End: 2024-12-12

## 2024-12-12 RX ORDER — ONDANSETRON HYDROCHLORIDE 2 MG/ML
4 INJECTION, SOLUTION INTRAVENOUS ONCE
Status: COMPLETED | OUTPATIENT
Start: 2024-12-12 | End: 2024-12-12

## 2024-12-12 RX ADMIN — KETOROLAC TROMETHAMINE 15 MG: 30 INJECTION, SOLUTION INTRAMUSCULAR at 08:22

## 2024-12-12 RX ADMIN — ONDANSETRON 4 MG: 2 INJECTION INTRAMUSCULAR; INTRAVENOUS at 08:22

## 2024-12-12 ASSESSMENT — COLUMBIA-SUICIDE SEVERITY RATING SCALE - C-SSRS
1. IN THE PAST MONTH, HAVE YOU WISHED YOU WERE DEAD OR WISHED YOU COULD GO TO SLEEP AND NOT WAKE UP?: NO
2. HAVE YOU ACTUALLY HAD ANY THOUGHTS OF KILLING YOURSELF?: NO
6. HAVE YOU EVER DONE ANYTHING, STARTED TO DO ANYTHING, OR PREPARED TO DO ANYTHING TO END YOUR LIFE?: NO

## 2024-12-12 ASSESSMENT — PAIN SCALES - GENERAL
PAINLEVEL_OUTOF10: 10 - WORST POSSIBLE PAIN
PAINLEVEL_OUTOF10: 5 - MODERATE PAIN
PAINLEVEL_OUTOF10: 5 - MODERATE PAIN

## 2024-12-12 ASSESSMENT — PAIN DESCRIPTION - FREQUENCY
FREQUENCY: CONSTANT/CONTINUOUS
FREQUENCY: CONSTANT/CONTINUOUS

## 2024-12-12 ASSESSMENT — PAIN DESCRIPTION - ORIENTATION: ORIENTATION: LOWER

## 2024-12-12 ASSESSMENT — PAIN DESCRIPTION - PAIN TYPE
TYPE: ACUTE PAIN
TYPE: ACUTE PAIN

## 2024-12-12 ASSESSMENT — PAIN DESCRIPTION - ONSET: ONSET: SUDDEN

## 2024-12-12 ASSESSMENT — PAIN DESCRIPTION - LOCATION
LOCATION: TEETH
LOCATION: MOUTH

## 2024-12-12 ASSESSMENT — PAIN DESCRIPTION - DESCRIPTORS
DESCRIPTORS: BURNING
DESCRIPTORS: BURNING;STABBING

## 2024-12-12 ASSESSMENT — PAIN - FUNCTIONAL ASSESSMENT
PAIN_FUNCTIONAL_ASSESSMENT: 0-10
PAIN_FUNCTIONAL_ASSESSMENT: 0-10

## 2024-12-12 ASSESSMENT — PAIN DESCRIPTION - PROGRESSION: CLINICAL_PROGRESSION: GRADUALLY IMPROVING

## 2024-12-12 NOTE — ED PROVIDER NOTES
HPI   Chief Complaint   Patient presents with    Dental Problem     They took my dental implant out and I think my whole jaw is infected       64-year-old female with past medical history significant for COPD, PE presents to the emergency department with a chief complaint of jaw pain.  Patient states that she had a dental implant placed on September 9, and recently had the pins removed on Monday.  She states she was told that her jaw was not infected but the pins were.  A culture was also taken at that time, patient states she spoke with her oral surgeon who told her that he was unsure if the antibiotic she was on would cover what was seen on the culture.  She was told to come to the emergency department if her symptoms worsened.  She is currently taking clindamycin, and states she is on her fifth day.  Patient reports the pain as right lower jaw, burning, nonradiating, everything makes it worse, ibuprofen is helpful but does not take away all of her pain.  Patient states she also had a Tmax of 100.9 this morning at 4:30 AM for which she took 1000 mg of Tylenol.  She states it feels like she has something draining into her throat, has been experiencing some palpitations.  She was recently on Eliquis due to a PE, but was taken off for 5 days prior to oral surgery.  She is able to swallow things, but has pain if using her jaw.  She has been back on Eliquis for 2 days.  Patient denies headache, abdominal pain, chest pain, shortness of breath, back pain, urinary symptoms.      History provided by:  Patient   used: No        Patient History   Past Medical History:   Diagnosis Date    COPD (chronic obstructive pulmonary disease) (Multi)      Past Surgical History:   Procedure Laterality Date    COLONOSCOPY      MANDIBLE SURGERY       No family history on file.  Social History     Tobacco Use    Smoking status: Every Day     Current packs/day: 0.50     Average packs/day: 0.5 packs/day for 15.3 years  (7.6 ttl pk-yrs)     Types: Cigarettes     Start date: 9/9/2009    Smokeless tobacco: Never   Vaping Use    Vaping status: Never Used   Substance Use Topics    Alcohol use: Not Currently    Drug use: Never       Physical Exam   ED Triage Vitals [12/12/24 0714]   Temperature Heart Rate Respirations BP   36.4 °C (97.5 °F) (!) 106 18 170/79      Pulse Ox Temp Source Heart Rate Source Patient Position   97 % Temporal Monitor --      BP Location FiO2 (%)     Right arm --       Physical Exam  Vitals and nursing note reviewed.   Constitutional:       General: She is not in acute distress.     Appearance: Normal appearance. She is not ill-appearing, toxic-appearing or diaphoretic.      Comments: Tearful on exam   HENT:      Head: Normocephalic and atraumatic.      Nose: Nose normal. No rhinorrhea.      Mouth/Throat:      Lips: Pink.      Mouth: Mucous membranes are moist.      Dentition: Abnormal dentition. Dental tenderness present.      Tongue: No lesions. Tongue does not deviate from midline.      Pharynx: Oropharynx is clear. Uvula midline. No oropharyngeal exudate, posterior oropharyngeal erythema, uvula swelling or postnasal drip.      Tonsils: No tonsillar exudate or tonsillar abscesses.   Eyes:      General:         Right eye: No discharge.         Left eye: No discharge.      Conjunctiva/sclera: Conjunctivae normal.   Cardiovascular:      Rate and Rhythm: Regular rhythm. Tachycardia present.      Pulses: Normal pulses.      Heart sounds: Normal heart sounds. No murmur heard.     No friction rub. No gallop.   Pulmonary:      Effort: Pulmonary effort is normal. No respiratory distress.      Breath sounds: Normal breath sounds. No wheezing.   Chest:      Chest wall: No tenderness.   Abdominal:      General: Abdomen is flat. Bowel sounds are normal. There is no distension.      Tenderness: There is no abdominal tenderness. There is no right CVA tenderness, left CVA tenderness or guarding.   Musculoskeletal:          General: Normal range of motion.      Right lower leg: No edema.      Left lower leg: No edema.   Lymphadenopathy:      Cervical: No cervical adenopathy.   Skin:     General: Skin is warm.      Capillary Refill: Capillary refill takes less than 2 seconds.   Neurological:      General: No focal deficit present.      Mental Status: She is alert and oriented to person, place, and time.      Sensory: No sensory deficit.      Motor: No weakness.       Labs Reviewed   CBC WITH AUTO DIFFERENTIAL - Abnormal       Result Value    WBC 4.0 (*)     nRBC 0.0      RBC 5.41 (*)     Hemoglobin 14.8      Hematocrit 45.1      MCV 83      MCH 27.4      MCHC 32.8      RDW 13.8      Platelets 253      Neutrophils % 47.5      Immature Granulocytes %, Automated 0.2      Lymphocytes % 36.0      Monocytes % 14.9      Eosinophils % 0.7      Basophils % 0.7      Neutrophils Absolute 1.91      Immature Granulocytes Absolute, Automated 0.01      Lymphocytes Absolute 1.45      Monocytes Absolute 0.60      Eosinophils Absolute 0.03      Basophils Absolute 0.03     COMPREHENSIVE METABOLIC PANEL - Abnormal    Glucose 102 (*)     Sodium 139      Potassium 4.0      Chloride 105      Bicarbonate 26      Anion Gap 12      Urea Nitrogen 6      Creatinine 0.70      eGFR >90      Calcium 9.2      Albumin 4.3      Alkaline Phosphatase 94      Total Protein 7.0      AST 15      Bilirubin, Total 1.1      ALT 9     C-REACTIVE PROTEIN - Abnormal    C-Reactive Protein 5.50 (*)    TROPONIN I, HIGH SENSITIVITY - Normal    Troponin I, High Sensitivity <3      Narrative:     Less than 99th percentile of normal range cutoff-  Female and children under 18 years old <14 ng/L; Male <21 ng/L: Negative  Repeat testing should be performed if clinically indicated.     Female and children under 18 years old 14-50 ng/L; Male 21-50 ng/L:  Consistent with possible cardiac damage and possible increased clinical   risk. Serial measurements may help to assess extent of myocardial  damage.     >50 ng/L: Consistent with cardiac damage, increased clinical risk and  myocardial infarction. Serial measurements may help assess extent of   myocardial damage.      NOTE: Children less than 1 year old may have higher baseline troponin   levels and results should be interpreted in conjunction with the overall   clinical context.     NOTE: Troponin I testing is performed using a different   testing methodology at [facility] than at other   [facility]. Direct result comparisons should only   be made within the same method.   LACTATE - Normal    Lactate 0.7      Narrative:     Venipuncture immediately after or during the administration of Metamizole may lead to falsely low results. Testing should be performed immediately prior to Metamizole dosing.        No orders to display       ED Course & MDM   Diagnoses as of 12/12/24 0901   Pain in lower jaw       No data recorded     Ismay Coma Scale Score: 15 (12/12/24 0714 : Janelle Pitt RN)                     Medical Decision Making  Ddx: Abscess, Post-op complication/infection, Facial Cellulitis.     64-year-old female with past medical history significant for COPD, PE presents to the emergency department with a chief complaint of jaw pain. Tachycardic at 106, otherwise vital signs within normal limits.    On initial evaluation patient is tearful on exam, nontoxic-appearing, in no acute distress.  Physical exam patient demonstrates tenderness to palpation of the right mandible.  Postoperative healing changes over the right mandibular incisors and bicuspids.  No visible signs of abscess.  Tachycardic but regular rhythm.  Clear breath sounds bilaterally.  No abdominal tenderness.  No CVA tenderness.      Patient was recently seen in the emergency department on 12/04/2024 for which she had a CT scan of the facial bones with no evidence of abscess.  Tissue culture from 12/09/2024 was reviewed and showed no polymorphonuclear leukocytes or  organisms on gram stain but was 1+ for beta lactamase.  Initial workup included lactate, CRP, troponin and EKG, CMP and CBC with differential.  Patient given Toradol and Zofran.  Upon reevaluation patient's pain significantly improved with Toradol.  CRP elevated at 5.5.  Other labs are reassuring against severe infection.    Given patient history and presentation patient will be discharged with instructions for close follow-up with her oral surgeon.  She states she has an appointment that she should be able to make it to once leaving the emergency department today.  She will continue her full course of clindamycin.  She was educated on differential diagnosis and given strict return precautions if any new symptoms or current symptoms worsen.  Patient demonstrated verbal understanding, all questions were answered and was in agreement with plan of care.  This visit was staffed with the attending physician Antonio Andrade MD.    Amount and/or Complexity of Data Reviewed  External Data Reviewed: labs and radiology.  Labs: ordered. Decision-making details documented in ED Course.  ECG/medicine tests: ordered.     Details: EKG at 0756, with ventricular rate of 81, as interpreted by me, shows a normal rate and rhythm, normal axis, normal intervals. And normal ST and T wave pattern with no evidence of acute ischemia or other acute findings      Shared JONAH Attestation:    This patient was seen by the advanced practice provider.  I personally saw the patient and made/approved the management plan and take responsibility for the patient management.    History: 64-year-old female presents with dental plain status post dental surgery.    Exam: Mildly tachycardic but regular rhythm cardiac exam with clear breath sounds bilaterally.  Abdomen is soft and nontender.  Negative Homans' sign bilaterally.  The patient does have some tenderness to palpation to her right lower jaw.  There is no visible evidence of abscess.    MDM: Abscess,  cellulitis, postoperative complication    Labs Reviewed   CBC WITH AUTO DIFFERENTIAL   COMPREHENSIVE METABOLIC PANEL   TROPONIN I, HIGH SENSITIVITY   C-REACTIVE PROTEIN   LACTATE       No orders to display         I have seen and examined the patient, agree with the workup, evaluation, medical decision making, management and diagnosis.  The care plan has been discussed.    MD Nahun Menard PA-C  12/12/24 0994

## 2024-12-15 LAB
ATRIAL RATE: 81 BPM
P AXIS: 80 DEGREES
P OFFSET: 207 MS
P ONSET: 159 MS
PR INTERVAL: 128 MS
Q ONSET: 223 MS
QRS COUNT: 13 BEATS
QRS DURATION: 78 MS
QT INTERVAL: 340 MS
QTC CALCULATION(BAZETT): 394 MS
QTC FREDERICIA: 375 MS
R AXIS: 76 DEGREES
T AXIS: 57 DEGREES
T OFFSET: 393 MS
VENTRICULAR RATE: 81 BPM

## 2025-01-22 DIAGNOSIS — M27.2 OSTEOMYELITIS OF JAW: Primary | ICD-10-CM

## 2025-02-03 ENCOUNTER — APPOINTMENT (OUTPATIENT)
Facility: HOSPITAL | Age: 65
End: 2025-02-03
Payer: OTHER GOVERNMENT

## 2025-02-27 ENCOUNTER — APPOINTMENT (OUTPATIENT)
Dept: RADIOLOGY | Facility: HOSPITAL | Age: 65
End: 2025-02-27
Payer: OTHER GOVERNMENT

## 2025-02-27 ENCOUNTER — HOSPITAL ENCOUNTER (EMERGENCY)
Facility: HOSPITAL | Age: 65
Discharge: HOME | End: 2025-02-28
Attending: EMERGENCY MEDICINE
Payer: OTHER GOVERNMENT

## 2025-02-27 DIAGNOSIS — R68.84 JAW PAIN: Primary | ICD-10-CM

## 2025-02-27 PROCEDURE — 85652 RBC SED RATE AUTOMATED: CPT

## 2025-02-27 PROCEDURE — 83605 ASSAY OF LACTIC ACID: CPT

## 2025-02-27 PROCEDURE — 36415 COLL VENOUS BLD VENIPUNCTURE: CPT

## 2025-02-27 PROCEDURE — 99285 EMERGENCY DEPT VISIT HI MDM: CPT | Performed by: EMERGENCY MEDICINE

## 2025-02-27 PROCEDURE — 80053 COMPREHEN METABOLIC PANEL: CPT | Performed by: EMERGENCY MEDICINE

## 2025-02-27 PROCEDURE — 36415 COLL VENOUS BLD VENIPUNCTURE: CPT | Performed by: EMERGENCY MEDICINE

## 2025-02-27 PROCEDURE — 86850 RBC ANTIBODY SCREEN: CPT

## 2025-02-27 PROCEDURE — 2500000001 HC RX 250 WO HCPCS SELF ADMINISTERED DRUGS (ALT 637 FOR MEDICARE OP)

## 2025-02-27 PROCEDURE — 85610 PROTHROMBIN TIME: CPT

## 2025-02-27 PROCEDURE — 85025 COMPLETE CBC W/AUTO DIFF WBC: CPT | Performed by: EMERGENCY MEDICINE

## 2025-02-27 PROCEDURE — 86140 C-REACTIVE PROTEIN: CPT

## 2025-02-27 RX ORDER — ACETAMINOPHEN 325 MG/1
650 TABLET ORAL ONCE
Status: COMPLETED | OUTPATIENT
Start: 2025-02-27 | End: 2025-02-27

## 2025-02-27 RX ADMIN — ACETAMINOPHEN 650 MG: 325 TABLET ORAL at 23:06

## 2025-02-27 ASSESSMENT — COLUMBIA-SUICIDE SEVERITY RATING SCALE - C-SSRS
1. IN THE PAST MONTH, HAVE YOU WISHED YOU WERE DEAD OR WISHED YOU COULD GO TO SLEEP AND NOT WAKE UP?: NO
6. HAVE YOU EVER DONE ANYTHING, STARTED TO DO ANYTHING, OR PREPARED TO DO ANYTHING TO END YOUR LIFE?: NO
6. HAVE YOU EVER DONE ANYTHING, STARTED TO DO ANYTHING, OR PREPARED TO DO ANYTHING TO END YOUR LIFE?: NO
2. HAVE YOU ACTUALLY HAD ANY THOUGHTS OF KILLING YOURSELF?: NO
1. IN THE PAST MONTH, HAVE YOU WISHED YOU WERE DEAD OR WISHED YOU COULD GO TO SLEEP AND NOT WAKE UP?: NO
2. HAVE YOU ACTUALLY HAD ANY THOUGHTS OF KILLING YOURSELF?: NO

## 2025-02-27 ASSESSMENT — PAIN - FUNCTIONAL ASSESSMENT: PAIN_FUNCTIONAL_ASSESSMENT: 0-10

## 2025-02-27 ASSESSMENT — PAIN DESCRIPTION - DESCRIPTORS: DESCRIPTORS: STABBING

## 2025-02-27 ASSESSMENT — PAIN SCALES - GENERAL: PAINLEVEL_OUTOF10: 10 - WORST POSSIBLE PAIN

## 2025-02-27 ASSESSMENT — PAIN DESCRIPTION - LOCATION: LOCATION: JAW

## 2025-02-27 NOTE — ED TRIAGE NOTES
Pt presenting to the ED with R jaw pain x one week and can't bite down fully. Osteomyelitis 12/23 reconstruction 9/24, hardware removed 12/24 due to infection. Pt is also endorsing chills and nausea

## 2025-02-28 ENCOUNTER — APPOINTMENT (OUTPATIENT)
Dept: RADIOLOGY | Facility: HOSPITAL | Age: 65
End: 2025-02-28
Payer: OTHER GOVERNMENT

## 2025-02-28 VITALS
HEIGHT: 66 IN | RESPIRATION RATE: 16 BRPM | DIASTOLIC BLOOD PRESSURE: 73 MMHG | WEIGHT: 137 LBS | HEART RATE: 80 BPM | TEMPERATURE: 97.6 F | SYSTOLIC BLOOD PRESSURE: 126 MMHG | BODY MASS INDEX: 22.02 KG/M2 | OXYGEN SATURATION: 95 %

## 2025-02-28 LAB
ABO GROUP (TYPE) IN BLOOD: NORMAL
ALBUMIN SERPL BCP-MCNC: 4.3 G/DL (ref 3.4–5)
ALP SERPL-CCNC: 83 U/L (ref 33–136)
ALT SERPL W P-5'-P-CCNC: 6 U/L (ref 7–45)
ANION GAP SERPL CALC-SCNC: 12 MMOL/L (ref 10–20)
ANTIBODY SCREEN: NORMAL
APTT PPP: 33 SECONDS (ref 26–36)
AST SERPL W P-5'-P-CCNC: 15 U/L (ref 9–39)
BASOPHILS # BLD AUTO: 0.04 X10*3/UL (ref 0–0.1)
BASOPHILS NFR BLD AUTO: 0.6 %
BILIRUB SERPL-MCNC: 1.5 MG/DL (ref 0–1.2)
BUN SERPL-MCNC: 8 MG/DL (ref 6–23)
CALCIUM SERPL-MCNC: 9.6 MG/DL (ref 8.6–10.6)
CHLORIDE SERPL-SCNC: 103 MMOL/L (ref 98–107)
CO2 SERPL-SCNC: 29 MMOL/L (ref 21–32)
CREAT SERPL-MCNC: 0.73 MG/DL (ref 0.5–1.05)
CRP SERPL-MCNC: <0.1 MG/DL
EGFRCR SERPLBLD CKD-EPI 2021: >90 ML/MIN/1.73M*2
EOSINOPHIL # BLD AUTO: 0.07 X10*3/UL (ref 0–0.7)
EOSINOPHIL NFR BLD AUTO: 1.1 %
ERYTHROCYTE [DISTWIDTH] IN BLOOD BY AUTOMATED COUNT: 14.8 % (ref 11.5–14.5)
ERYTHROCYTE [SEDIMENTATION RATE] IN BLOOD BY WESTERGREN METHOD: 2 MM/H (ref 0–30)
GLUCOSE SERPL-MCNC: 78 MG/DL (ref 74–99)
HCT VFR BLD AUTO: 43.6 % (ref 36–46)
HGB BLD-MCNC: 14.1 G/DL (ref 12–16)
IMM GRANULOCYTES # BLD AUTO: 0.01 X10*3/UL (ref 0–0.7)
IMM GRANULOCYTES NFR BLD AUTO: 0.2 % (ref 0–0.9)
INR PPP: 1.1 (ref 0.9–1.1)
LACTATE SERPL-SCNC: 0.8 MMOL/L (ref 0.4–2)
LYMPHOCYTES # BLD AUTO: 2.62 X10*3/UL (ref 1.2–4.8)
LYMPHOCYTES NFR BLD AUTO: 42 %
MCH RBC QN AUTO: 26.4 PG (ref 26–34)
MCHC RBC AUTO-ENTMCNC: 32.3 G/DL (ref 32–36)
MCV RBC AUTO: 82 FL (ref 80–100)
MONOCYTES # BLD AUTO: 0.4 X10*3/UL (ref 0.1–1)
MONOCYTES NFR BLD AUTO: 6.4 %
NEUTROPHILS # BLD AUTO: 3.1 X10*3/UL (ref 1.2–7.7)
NEUTROPHILS NFR BLD AUTO: 49.7 %
NRBC BLD-RTO: 0 /100 WBCS (ref 0–0)
PLATELET # BLD AUTO: 253 X10*3/UL (ref 150–450)
POTASSIUM SERPL-SCNC: 4 MMOL/L (ref 3.5–5.3)
PROT SERPL-MCNC: 6.5 G/DL (ref 6.4–8.2)
PROTHROMBIN TIME: 12.5 SECONDS (ref 9.8–12.4)
RBC # BLD AUTO: 5.35 X10*6/UL (ref 4–5.2)
RH FACTOR (ANTIGEN D): NORMAL
SODIUM SERPL-SCNC: 140 MMOL/L (ref 136–145)
WBC # BLD AUTO: 6.2 X10*3/UL (ref 4.4–11.3)

## 2025-02-28 PROCEDURE — 70491 CT SOFT TISSUE NECK W/DYE: CPT | Performed by: STUDENT IN AN ORGANIZED HEALTH CARE EDUCATION/TRAINING PROGRAM

## 2025-02-28 PROCEDURE — 70491 CT SOFT TISSUE NECK W/DYE: CPT

## 2025-02-28 PROCEDURE — 2550000001 HC RX 255 CONTRASTS: Performed by: EMERGENCY MEDICINE

## 2025-02-28 RX ORDER — CLINDAMYCIN HYDROCHLORIDE 300 MG/1
300 CAPSULE ORAL ONCE
Status: DISCONTINUED | OUTPATIENT
Start: 2025-02-28 | End: 2025-02-28 | Stop reason: HOSPADM

## 2025-02-28 RX ADMIN — IOHEXOL 80 ML: 350 INJECTION, SOLUTION INTRAVENOUS at 00:42

## 2025-02-28 NOTE — PROGRESS NOTES
Emergency Department Transition of Care Note       Signout   I received Renetta Waldron in signout from Dr. Walker.  Please see the ED Provider Note for all HPI, PE and MDM up to the time of signout at 11 PM.  This is in addition to the primary record.    In brief Renetta Waldron is an 65 y.o. female presenting for jaw pain.  She has a history of osteomyelitis and osteonecrosis of the mandible s/p mandibular debridement and placement of reconstructive plate.  This was removed back in September.  Blood work and CT ordered due to concern for possible infection in the jaw.    At the time of signout we were awaiting:  Blood work and CT    ED Course & Medical Decision Making   Medical Decision Making:  Under my care, blood work was nonconcerning.  ESR and CRP were not elevated, and patient does not have any white count.  She is nontoxic-appearing, vitals are stable and patient is afebrile.  CT showed mild soft tissue thickening which may be postop although superimposed phlegmonous change is difficult to entirely exclude.  I had a discussion with patient at this time about consulting OMFS while she is in the ED versus being discharged with oral antibiotics with follow-up with OMFS.  Patient stated that she did not want oral antibiotics and would not take oral antibiotics again and that I needed to call OMFS.  OMFS consulted at this time and saw patient.  OMFS recommended clindamycin for 7 days with follow-up on 3/10 and that there is no acute infection.  Dose of clindamycin ordered here.  Patient refused antibiotics and stated that she will not be taking another course of antibiotics.  She said she wanted to leave at this time.  Placed a referral for OMFS follow-up and was typing discharge papers when patient left ED.  RN went to give discharge papers to patient, patient argued with RN at this time.  Vitals were stable on discharge.    ED Course:  ED Course as of 02/28/25 0419   Fri Feb 28, 2025   0157 Sed Rate: 2 [WJ]    0157 C-Reactive Protein: <0.10 [WJ]   0158 CT soft tissue neck w IV contrast  there is mild soft tissue thickening measuring 0.9 x 1.6 cm  which may be postoperative although superimposed phlegmonous change  is difficult to entirely exclude. Minimal soft tissue swelling is  also present about the chin.   [WJ]   0330 OMFS - says no acute infection. Clindamycin 300 mg TID x 7 days recommended. Follow-up on 3/10  [WJ]   0349 Patient stated at this time that she will not be taking any outpatient antibiotics unless she has cultures.  Patient states that she wants to leave at this time with her discharge papers.  Attempted to service recovery, patient left while I was typing up discharge papers, RN went to waiting room to see if she could give them to patient. [AW]      ED Course User Index  [AW] Martha Bradshaw DO  [WJ] Domenic Verdugo DO         Diagnoses as of 02/28/25 0419   Jaw pain       Disposition   As a result of the work-up, the patient was discharged home.  she was informed of her diagnosis and instructed to come back with any concerns or worsening of condition.  she and was agreeable to the plan as discussed above.  she was given the opportunity to ask questions.  All of the patient's questions were answered.    Procedures   Procedures    Patient seen and discussed with ED attending physician.    Martha Bradshaw DO  Emergency Medicine

## 2025-02-28 NOTE — DISCHARGE INSTRUCTIONS
You presented to the ED for jaw pain.  Your blood work was nonconcerning, your inflammatory markers were elevated and did not have a white count.  Your CT showed possible soft tissue thickening with start of a possible phlegmon.  OMFS saw you, and recommended outpatient clindamycin for a week with follow-up.  You did not want oral antibiotics at this time.  If this is a start of an infection, without antibiotics you are at a risk of further side effects or spread of infection.  Please follow-up with OMFS on March 10.  Please follow-up with your PCP as you are able.  Please return to the ED for any worsening symptoms, including but not limited to increasing and persistent pain, fever, chills, difficulty swallowing, difficulty breathing, or any symptoms that are concerning to you.

## 2025-02-28 NOTE — CONSULTS
"Reason For Consult  Pain in right mandible      History Of Present Illness  Renetta Waldron is a 65 y.o. female presenting with a chief complaint of pain in the right mandible upon biting. Patient has an extensive history significant for osteomyelitis of the anterior mandible s/p mandibular debridement, anterior iliac crest graft and placement of a reconstruction plate. Her post-op course was c/b infection which prompted the removal of the plate and graft, along with removal of implant #27 in the lower right. Patient reports swelling in her chin but denies drainage in her mouth. She reports right jaw pain \"when I bite my teeth on my lower jaw. I can't eat!\" Despite not having dentures, she has been occluding with her maxillary teeth onto an edentulous mandible. She states that her ultimate goal is, \"to get this bone fixed and 3 implants placed. I want to be done by June 2nd! I can't do this dental shit anymore.\"      Past Medical History  She has a past medical history of COPD (chronic obstructive pulmonary disease) (Multi).    Surgical History  She has a past surgical history that includes Colonoscopy and Mandible surgery.     Social History  She reports that she has been smoking cigarettes. She started smoking about 15 years ago. She has a 7.7 pack-year smoking history. She has never used smokeless tobacco. She reports that she does not currently use alcohol. She reports that she does not use drugs.    Family History  No family history on file.     Allergies  Levonorgestrel-ethinyl estrad, Penicillins, Sulfa (sulfonamide antibiotics), Ciprofloxacin, Clavulanic acid, Azithromycin, Cephalexin, Chlorhexidine, Esomeprazole, Levofloxacin, and Omeprazole    Review of Systems  Negative unless otherwise stated in HPI     Physical Exam  Constitutional:       Appearance: Normal appearance.   HENT:      Head:      Comments: Patient has no facial asymmetry  Negative TTP b/l  No b/l TMJ clicking/popping/TTP  ESTEVAN is 40mm  CN " "VII is intact b/l  CN V1 an V2 is intact b/l  CN V3 is hypoesthetic over the anterior chin area  Intraoral exam reveals  atrophic anterior mandible with bilateral bony exostoses along the area of missing premolars  No active signs of infection, no dehiscence  Eyes:      Pupils: Pupils are equal, round, and reactive to light.   Cardiovascular:      Comments: Warm and well-perfused  Pulmonary:      Comments: Breathing comfortably on room air  Abdominal:      General: Abdomen is flat.      Palpations: Abdomen is soft.      Comments: Patient has IBS    Musculoskeletal:         General: Normal range of motion.      Cervical back: Normal range of motion.   Skin:     General: Mild erythema along the chin bilaterally, previous neck incision intact without signs of infection. Scar band palpated along the midline in the submental region that is not TTP.  Neurological:      Mental Status: She is alert and oriented to person, place, and time.   Psychiatric:         Mood and Affect: Frustrated      Behavior: Behavior normal            Last Recorded Vitals  Blood pressure 126/73, pulse 80, temperature 36.4 °C (97.6 °F), temperature source Oral, resp. rate 16, height 1.676 m (5' 6\"), weight 62.1 kg (137 lb), SpO2 95%.    Relevant Results  Imaging (CT soft tissue neck from 2/27/25)   Patient with a history of mandibular osteonecrosis status post prior  debridement and fixation. Compared to 12/04/2024, the midline  fixation hardware and bone graft have been removed. In the residual  cavity, there is mild soft tissue thickening measuring 0.9 x 1.6 cm  which may be postoperative although superimposed phlegmonous change  is difficult to entirely exclude. Minimal soft tissue swelling is  also present about the chin.      No drainable abscesses or collections. Other chronic findings as  described above.     Assessment/Plan   Patient is a 64 year old female who presents with right sided jaw pain in her mouth due to \"chewing on my lower " "jaw. I do not have dentures.\" CT scan and clinical exam showed no signs of active infection. This was explained to the patient. Patient requested to be seen at [facility] outpatient clinic ASAP for further discussion of surgical options.     Plan:  - No acute surgical intervention indicated at this time  - Oral abx per ED, patient has multiple allergies to abx   - Analgesics per ED   - Peridex mouthrinse TID, swish and spit   - Follow up with [facility] outpatient clinic on Monday 10th of March after patients EGD which is scheduled on 3/5/2025.   - Plan to discuss further surgical options if indicated and set expectations moving forward    Jeanne Woods DDS  [facility] PGY-3  Team Pager [phone]    "

## 2025-02-28 NOTE — ED PROVIDER NOTES
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    HPI:  Renetta Waldron is a 65 y.o. female with PMHx of COPD, osteomyelitis and necrosis of the mandible, recent PE on Eliquis, who presents to the emergency department reporting significant right sided jaw pain onset 2 days ago.  Patient states that she has been unable to eat anything on that side of her mouth, and the pain is progressively worse movement of the jaw causes significant pain.  Patient endorses over the last couple weeks feeling generalized fatigue, intermittent chills, and a bitter taste in her mouth.     Physical Exam   Triage vitals:  T 36.7 °C (98 °F)  HR 83  /81  RR 16  O2 94 % None (Room air)    General: Awake, alert, in no acute distress  Eyes: Gaze conjugate.  No scleral icterus or injection  HENT: Normo-cephalic, atraumatic. No stridor.  Right lower gumline with absent dentition, small ulceration to the gumline, and posterior aspect near where the molars would be there is an obvious discolored piece of tissue, possibly ulcerated.  Tender to palpation along the entire right lower mandible.  Small indurated mass located over the skin superficially submentally predominantly on the left side.  No increased warmth, however surrounding erythema.  CV: Regular rate, regular rhythm. Radial pulses 2+ bilaterally  Resp: Breathing non-labored, speaking in full sentences.  Clear to auscultation bilaterally  GI: Soft, non-distended, non-tender. No rebound or guarding.  MSK/Extremities: No gross bony deformities. Moving all extremities  Skin: Warm. Appropriate color  Neuro: Alert. Oriented. Face symmetric.  Speech is fluent.  Gross strength and sensation intact in b/l UE and LEs  Psych: Appropriate mood and affect    Medical Decision Making & ED Course   Medical Decision Makin y.o. female with PMHx of COPD, osteomyelitis and necrosis of the mandible, recent PE on Eliquis, who presents to the emergency department reporting significant right sided jaw pain onset 2 days ago.  On arrival, vital signs unremarkable.  Physical exam as described above.  Given patient's, acute history requiring multiple surgeries for osteonecrosis and osteomyelitis of the mandible with bone graft etiology metabolic debridement of the mandible.  Patient's reports of significant pain and tenderness to the area concerning for infection.  Will obtain CT imaging of the soft tissue neck to evaluate for worsening o or new osteonecrosis or osteomyelitis of the mandible, as well as evaluate for deep tissue soft tissue infections.  Although low clinical suspicion for the deep tissue soft neck infection and patient has good swallowing mechanism, no drooling, no stridor, no fever.  Additionally will obtain basic lab workup to include evaluate for infection including CBC, CMP, ESR, CRP and lactate.  Will consult OMFS to evaluate patient.  Disposition pending completion of workup, will sign out to night team.  ----   Social Determinants of Health which Significantly Impact Care: None identified     EKG Independent Interpretation: EKG not obtained    Independent Result Review and Interpretation: Relevant laboratory and radiographic results were reviewed and independently interpreted by myself.  As necessary, they are commented on in the ED Course.    Chronic conditions affecting the patient's care: As documented above in [facility]    The patient was discussed with the following consultants/services:  OMFS    Care Considerations: As documented above in [facility]    ED Course:   ED Course as of 25 1118      0157 Sed Rate: 2 [WJ]   0157  C-Reactive Protein: <0.10 [WJ]   0158 CT soft tissue neck w IV contrast  there is mild soft tissue thickening measuring 0.9 x 1.6 cm  which may be postoperative although superimposed phlegmonous change  is difficult to entirely exclude. Minimal soft tissue swelling is  also present about the chin.   [WJ]   0330 OMFS - says no acute infection. Clindamycin 300 mg TID x 7 days recommended. Follow-up on 3/10  [WJ]   0349 Patient stated at this time that she will not be taking any outpatient antibiotics unless she has cultures.  Patient states that she wants to leave at this time with her discharge papers.  Attempted to service recovery, patient left while I was typing up discharge papers, RN went to waiting room to see if she could give them to patient. [AW]      ED Course User Index  [AW] Martha Bradshaw DO  [WJ] Domenic Verdugo DO         Diagnoses as of 03/01/25 1118   Jaw pain     Disposition   Patient was signed out to Dr. Bradshaw at 2300 pending completion of their work-up.  Please see the next provider's transition of care note for the remainder of the patient's care.     Procedures   Procedures    Patient seen and discussed with ED attending physician.    Mansoor Walker DO  Emergency Medicine     Mansoor Walker DO  Resident  03/01/25 1136     leave at this time with her discharge papers.  Attempted to service recovery, patient left while I was typing up discharge papers, RN went to waiting room to see if she could give them to patient. [AW]      ED Course User Index  [AW] Martha Bradshaw DO  [WJ] Domenic Verdugo DO         Diagnoses as of 03/06/25 1337   Jaw pain       The patient was seen by the resident/fellow.  I have personally performed a substantive portion of the encounter.  I have seen and examined the patient; agree with the workup, evaluation, MDM, management and diagnosis.  The care plan has been discussed with the resident; I have reviewed the resident’s note and agree with the documented findings.                    MD Alva Arnold MD  03/06/25 [id number]

## 2025-03-14 ENCOUNTER — HOSPITAL ENCOUNTER (EMERGENCY)
Facility: HOSPITAL | Age: 65
Discharge: HOME | End: 2025-03-14
Attending: EMERGENCY MEDICINE
Payer: OTHER GOVERNMENT

## 2025-03-14 ENCOUNTER — APPOINTMENT (OUTPATIENT)
Dept: RADIOLOGY | Facility: HOSPITAL | Age: 65
End: 2025-03-14
Payer: OTHER GOVERNMENT

## 2025-03-14 VITALS
RESPIRATION RATE: 16 BRPM | DIASTOLIC BLOOD PRESSURE: 82 MMHG | TEMPERATURE: 97.2 F | HEART RATE: 86 BPM | OXYGEN SATURATION: 99 % | SYSTOLIC BLOOD PRESSURE: 154 MMHG

## 2025-03-14 DIAGNOSIS — L03.211 CELLULITIS OF CHIN: Primary | ICD-10-CM

## 2025-03-14 LAB
ALBUMIN SERPL BCP-MCNC: 4.5 G/DL (ref 3.4–5)
ALP SERPL-CCNC: 91 U/L (ref 33–136)
ALT SERPL W P-5'-P-CCNC: 8 U/L (ref 7–45)
ANION GAP SERPL CALC-SCNC: 14 MMOL/L (ref 10–20)
AST SERPL W P-5'-P-CCNC: 13 U/L (ref 9–39)
BASOPHILS # BLD AUTO: 0.05 X10*3/UL (ref 0–0.1)
BASOPHILS NFR BLD AUTO: 0.8 %
BILIRUB SERPL-MCNC: 0.8 MG/DL (ref 0–1.2)
BUN SERPL-MCNC: 10 MG/DL (ref 6–23)
CALCIUM SERPL-MCNC: 9.8 MG/DL (ref 8.6–10.6)
CHLORIDE SERPL-SCNC: 103 MMOL/L (ref 98–107)
CO2 SERPL-SCNC: 28 MMOL/L (ref 21–32)
CREAT SERPL-MCNC: 0.79 MG/DL (ref 0.5–1.05)
EGFRCR SERPLBLD CKD-EPI 2021: 83 ML/MIN/1.73M*2
EOSINOPHIL # BLD AUTO: 0.06 X10*3/UL (ref 0–0.7)
EOSINOPHIL NFR BLD AUTO: 1 %
ERYTHROCYTE [DISTWIDTH] IN BLOOD BY AUTOMATED COUNT: 15.1 % (ref 11.5–14.5)
GLUCOSE SERPL-MCNC: 86 MG/DL (ref 74–99)
HCT VFR BLD AUTO: 45 % (ref 36–46)
HGB BLD-MCNC: 14.9 G/DL (ref 12–16)
IMM GRANULOCYTES # BLD AUTO: 0.01 X10*3/UL (ref 0–0.7)
IMM GRANULOCYTES NFR BLD AUTO: 0.2 % (ref 0–0.9)
LYMPHOCYTES # BLD AUTO: 2 X10*3/UL (ref 1.2–4.8)
LYMPHOCYTES NFR BLD AUTO: 33.2 %
MCH RBC QN AUTO: 26.7 PG (ref 26–34)
MCHC RBC AUTO-ENTMCNC: 33.1 G/DL (ref 32–36)
MCV RBC AUTO: 81 FL (ref 80–100)
MONOCYTES # BLD AUTO: 0.33 X10*3/UL (ref 0.1–1)
MONOCYTES NFR BLD AUTO: 5.5 %
NEUTROPHILS # BLD AUTO: 3.57 X10*3/UL (ref 1.2–7.7)
NEUTROPHILS NFR BLD AUTO: 59.3 %
NRBC BLD-RTO: 0 /100 WBCS (ref 0–0)
PLATELET # BLD AUTO: 279 X10*3/UL (ref 150–450)
POTASSIUM SERPL-SCNC: 4.2 MMOL/L (ref 3.5–5.3)
PROT SERPL-MCNC: 7 G/DL (ref 6.4–8.2)
RBC # BLD AUTO: 5.58 X10*6/UL (ref 4–5.2)
SODIUM SERPL-SCNC: 141 MMOL/L (ref 136–145)
WBC # BLD AUTO: 6 X10*3/UL (ref 4.4–11.3)

## 2025-03-14 PROCEDURE — 70491 CT SOFT TISSUE NECK W/DYE: CPT

## 2025-03-14 PROCEDURE — 99285 EMERGENCY DEPT VISIT HI MDM: CPT | Mod: 25 | Performed by: EMERGENCY MEDICINE

## 2025-03-14 PROCEDURE — 2550000001 HC RX 255 CONTRASTS: Performed by: EMERGENCY MEDICINE

## 2025-03-14 PROCEDURE — 80053 COMPREHEN METABOLIC PANEL: CPT | Performed by: EMERGENCY MEDICINE

## 2025-03-14 PROCEDURE — 85025 COMPLETE CBC W/AUTO DIFF WBC: CPT | Performed by: EMERGENCY MEDICINE

## 2025-03-14 PROCEDURE — 36415 COLL VENOUS BLD VENIPUNCTURE: CPT | Performed by: EMERGENCY MEDICINE

## 2025-03-14 PROCEDURE — 99285 EMERGENCY DEPT VISIT HI MDM: CPT | Performed by: EMERGENCY MEDICINE

## 2025-03-14 RX ORDER — CLINDAMYCIN HYDROCHLORIDE 300 MG/1
300 CAPSULE ORAL 3 TIMES DAILY
Qty: 21 CAPSULE | Refills: 0 | Status: SHIPPED | OUTPATIENT
Start: 2025-03-14 | End: 2025-03-21

## 2025-03-14 RX ADMIN — IOHEXOL 90 ML: 350 INJECTION, SOLUTION INTRAVENOUS at 12:15

## 2025-03-14 NOTE — ED PROVIDER NOTES
"Limitations to History: None     HPI:      Renetta Waldron is a 65 y.o. who presents to the ED with swelling under her chin.  The patient has had multiple jaw surgeries, and was seen here recently with a CT that showed some indeterminate soft tissue swelling and refused oral antibiotics.  She saw OMFS who did not think she had any acute abnormalities.  This thing under her chin continued to grow and when she was washing it this morning started bleeding.  She is concerned about infection.  She denies any pain in the floor of her mouth but says it feels kind of \"abnormal\", but denies true swelling.  She said something is making her feel unwell and she wants to figure out what it is    ------------------------------------------------------------------------------------------------------------------------------------------    VS: /82   Pulse 86   Temp 36.2 °C (97.2 °F) (Temporal)   Resp 16   SpO2 99%     Physical Exam:  Gen: Alert, NAD  Head/Neck: NCAT, neck w/ FROM  Eyes: EOMI, PERRL, anicteric sclerae, noninjected conjunctivae  Mouth: A dentulous on the bottom, floor the mouth is soft without any palpable masses or drainage, she does have some swelling under her chin below the incision line that feels indurated but not significantly tender or warm, no open wound part of her wound but does not show any active drainage and nothing is expressible from the wound  Heart: RRR no MRG  Lungs: CTA b/l no RRW, no increased work of breathing  Abdomen: soft, NT, ND, no HSM, no palpable masses  Musculoskeletal: no joint swelling noted  Extremities: WWP, no c/c/e, cap refill <2sec  Neurologic: Alert, symmetrical facies, phonates clearly, moves all extremities equally, responsive to touch, ambulates normally      ------------------------------------------------------------------------------------------------------------------------------------------    Medical Decision Making: Patient presents emerged with the chin " swelling.  She has developing abscess but nothing to drain there.  I did put her on oral antibiotics.  She has no evidence of Ludwigs or other postoperative complications.    Diagnoses as of 03/14/25 2645   Cellulitis of chin       Medications - No data to display    Chronic Medical Conditions Significantly Affecting Care: Multiple jaw surgeries, multiple allergies to medications    External Records Reviewed: I reviewed recent and relevant outside records including: ED note and OMFS consult note from 2/27/2025         Jonas Babcock MD  03/14/25 1740

## 2025-03-14 NOTE — ED TRIAGE NOTES
"Pt had jaw replacement surgery in September, says she has a \"pimple\" at the site, followed up with OMFS, was told it is just a pimple, concerned it looks bigger  "

## 2025-03-14 NOTE — DISCHARGE INSTRUCTIONS
Please take the antibiotics until they are gone, please follow-up with your primary care doctor or your dental physician if needed.  This is not necessarily related to your injury or your surgery.

## 2025-09-04 ENCOUNTER — APPOINTMENT (OUTPATIENT)
Dept: GASTROENTEROLOGY | Facility: CLINIC | Age: 65
End: 2025-09-04
Payer: MEDICARE

## 2025-10-29 ENCOUNTER — APPOINTMENT (OUTPATIENT)
Dept: GASTROENTEROLOGY | Facility: CLINIC | Age: 65
End: 2025-10-29
Payer: MEDICARE

## (undated) DEVICE — Device

## (undated) DEVICE — GOLF PENCIL, LF, STERILE

## (undated) DEVICE — TUBE, SALEM SUMP, 16 FR X 48IN, ENFIT

## (undated) DEVICE — COVER, CART, 45 X 27 X 48 IN, CLEAR

## (undated) DEVICE — DRILL BIT, 1.6MM, CROSS-CUT FISSURE

## (undated) DEVICE — STIMULATOR, NERVE LOCATOR, HEAD&NECK

## (undated) DEVICE — SPONGE, LAP, XRAY DECT, 18IN X 18IN, W/LOOP, STERILE

## (undated) DEVICE — BUR, SOLID OVAL, CARBIDE, MEDIUM, 4MM

## (undated) DEVICE — REST, HEAD, BAGEL, 9 IN

## (undated) DEVICE — MANIFOLD, 4 PORT NEPTUNE STANDARD

## (undated) DEVICE — SCISSORS, WIRE CUTTER, 4 IN, STERILE, DISP

## (undated) DEVICE — TRAY, MINOR, SINGLE BASIN, STERILE

## (undated) DEVICE — SUTURE, PLAIN, 5-0, 18 IN, PC1, YELLOW

## (undated) DEVICE — DRILL, STRYKER 1.6 X 58 TWIST

## (undated) DEVICE — FLOSEAL, MATRIX, HEMOSTATIC, FULL STERILE PREP, 5ML

## (undated) DEVICE — DRILL BIT, 4MM EGG

## (undated) DEVICE — SUTURE, CHROMIC, 3-0, 27 IN, PS2, BROWN

## (undated) DEVICE — SUTURE, VICRYL, 4-0, 18 IN, PS-4C, UNDYED

## (undated) DEVICE — TUBING, SUCTION, CONNECTING, NON-CONDUCTIVE, SURE GRIP CONNECTORS, 3/16 X 18 IN, PVC

## (undated) DEVICE — SPONGE GAUZE, XRAY SC+RFID, 4X4 16 PLY, STERILE

## (undated) DEVICE — BLADE, SAW, RECIPROCATING, TAPER, 27 X 0.64 MM

## (undated) DEVICE — TIP, SUCTION, YANKAUER, BULB, ADULT

## (undated) DEVICE — PAD, GROUNDING, ELECTROSURGICAL, W/9 FT CABLE, POLYHESIVE II, ADULT, LF

## (undated) DEVICE — SUTURE, SURGICAL STEEL, 2, 24 G, 18 IN, MULTIPACK

## (undated) DEVICE — SUTURE, VICRYL, 4-0, 18 IN, UNDYED BR PS-2

## (undated) DEVICE — SEALER, BIPOLAR, AQUA MANTYS MIS FLEX MINI

## (undated) DEVICE — GOWN, ASTOUND, XL

## (undated) DEVICE — SUCTION, VERSALIGHT MINI W/EXTENDED FRAZIER TIP